# Patient Record
Sex: MALE | Race: OTHER
[De-identification: names, ages, dates, MRNs, and addresses within clinical notes are randomized per-mention and may not be internally consistent; named-entity substitution may affect disease eponyms.]

---

## 2017-10-19 ENCOUNTER — HOSPITAL ENCOUNTER (EMERGENCY)
Dept: HOSPITAL 8 - ED | Age: 37
Discharge: LEFT BEFORE DISCHARGE | End: 2017-10-19
Payer: MEDICAID

## 2017-10-19 VITALS
DIASTOLIC BLOOD PRESSURE: 98 MMHG | HEIGHT: 69 IN | BODY MASS INDEX: 21.52 KG/M2 | WEIGHT: 145.31 LBS | SYSTOLIC BLOOD PRESSURE: 150 MMHG

## 2017-10-19 DIAGNOSIS — F19.10: ICD-10-CM

## 2017-10-19 DIAGNOSIS — Z59.0: ICD-10-CM

## 2017-10-19 DIAGNOSIS — R50.9: Primary | ICD-10-CM

## 2017-10-19 PROCEDURE — 93005 ELECTROCARDIOGRAM TRACING: CPT

## 2017-10-19 PROCEDURE — 99283 EMERGENCY DEPT VISIT LOW MDM: CPT

## 2017-10-19 SDOH — ECONOMIC STABILITY - HOUSING INSECURITY: HOMELESSNESS: Z59.0

## 2018-02-13 ENCOUNTER — HOSPITAL ENCOUNTER (EMERGENCY)
Facility: MEDICAL CENTER | Age: 38
End: 2018-02-13
Attending: EMERGENCY MEDICINE
Payer: MEDICAID

## 2018-02-13 ENCOUNTER — APPOINTMENT (OUTPATIENT)
Dept: RADIOLOGY | Facility: MEDICAL CENTER | Age: 38
End: 2018-02-13
Attending: EMERGENCY MEDICINE
Payer: MEDICAID

## 2018-02-13 VITALS
WEIGHT: 165 LBS | TEMPERATURE: 98.7 F | RESPIRATION RATE: 12 BRPM | OXYGEN SATURATION: 100 % | SYSTOLIC BLOOD PRESSURE: 147 MMHG | HEIGHT: 69 IN | DIASTOLIC BLOOD PRESSURE: 96 MMHG | BODY MASS INDEX: 24.44 KG/M2 | HEART RATE: 98 BPM

## 2018-02-13 DIAGNOSIS — S21.211A STAB WOUND OF RIGHT SIDE OF BACK, INITIAL ENCOUNTER: ICD-10-CM

## 2018-02-13 LAB
ABO GROUP BLD: NORMAL
ALBUMIN SERPL BCP-MCNC: 4.8 G/DL (ref 3.2–4.9)
ALBUMIN/GLOB SERPL: 1.5 G/DL
ALP SERPL-CCNC: 88 U/L (ref 30–99)
ALT SERPL-CCNC: 20 U/L (ref 2–50)
ANION GAP SERPL CALC-SCNC: 15 MMOL/L (ref 0–11.9)
APTT PPP: 25.8 SEC (ref 24.7–36)
AST SERPL-CCNC: 17 U/L (ref 12–45)
BILIRUB SERPL-MCNC: 0.7 MG/DL (ref 0.1–1.5)
BLD GP AB SCN SERPL QL: NORMAL
BUN SERPL-MCNC: 18 MG/DL (ref 8–22)
CALCIUM SERPL-MCNC: 10.2 MG/DL (ref 8.5–10.5)
CHLORIDE SERPL-SCNC: 100 MMOL/L (ref 96–112)
CO2 SERPL-SCNC: 23 MMOL/L (ref 20–33)
CREAT SERPL-MCNC: 1.27 MG/DL (ref 0.5–1.4)
ERYTHROCYTE [DISTWIDTH] IN BLOOD BY AUTOMATED COUNT: 39.6 FL (ref 35.9–50)
ETHANOL BLD-MCNC: 0 G/DL
GLOBULIN SER CALC-MCNC: 3.2 G/DL (ref 1.9–3.5)
GLUCOSE SERPL-MCNC: 143 MG/DL (ref 65–99)
HCT VFR BLD AUTO: 51.7 % (ref 42–52)
HGB BLD-MCNC: 17.2 G/DL (ref 14–18)
INR PPP: 1.02 (ref 0.87–1.13)
MCH RBC QN AUTO: 29.1 PG (ref 27–33)
MCHC RBC AUTO-ENTMCNC: 33.3 G/DL (ref 33.7–35.3)
MCV RBC AUTO: 87.5 FL (ref 81.4–97.8)
PLATELET # BLD AUTO: 387 K/UL (ref 164–446)
PMV BLD AUTO: 8.5 FL (ref 9–12.9)
POTASSIUM SERPL-SCNC: 3.9 MMOL/L (ref 3.6–5.5)
PROT SERPL-MCNC: 8 G/DL (ref 6–8.2)
PROTHROMBIN TIME: 13.1 SEC (ref 12–14.6)
RBC # BLD AUTO: 5.91 M/UL (ref 4.7–6.1)
RH BLD: NORMAL
SODIUM SERPL-SCNC: 138 MMOL/L (ref 135–145)
WBC # BLD AUTO: 13.8 K/UL (ref 4.8–10.8)

## 2018-02-13 PROCEDURE — 86850 RBC ANTIBODY SCREEN: CPT

## 2018-02-13 PROCEDURE — 90715 TDAP VACCINE 7 YRS/> IM: CPT | Performed by: SURGERY

## 2018-02-13 PROCEDURE — 700111 HCHG RX REV CODE 636 W/ 250 OVERRIDE (IP): Performed by: SURGERY

## 2018-02-13 PROCEDURE — 305308 HCHG STAPLER,SKIN,DISP.

## 2018-02-13 PROCEDURE — 80307 DRUG TEST PRSMV CHEM ANLYZR: CPT

## 2018-02-13 PROCEDURE — 99284 EMERGENCY DEPT VISIT MOD MDM: CPT

## 2018-02-13 PROCEDURE — 71045 X-RAY EXAM CHEST 1 VIEW: CPT

## 2018-02-13 PROCEDURE — 85730 THROMBOPLASTIN TIME PARTIAL: CPT

## 2018-02-13 PROCEDURE — 90471 IMMUNIZATION ADMIN: CPT

## 2018-02-13 PROCEDURE — 304217 HCHG IRRIGATION SYSTEM

## 2018-02-13 PROCEDURE — 80053 COMPREHEN METABOLIC PANEL: CPT

## 2018-02-13 PROCEDURE — 86901 BLOOD TYPING SEROLOGIC RH(D): CPT

## 2018-02-13 PROCEDURE — 304999 HCHG REPAIR-SIMPLE/INTERMED LEVEL 1

## 2018-02-13 PROCEDURE — 85610 PROTHROMBIN TIME: CPT

## 2018-02-13 PROCEDURE — 85027 COMPLETE CBC AUTOMATED: CPT

## 2018-02-13 PROCEDURE — 700117 HCHG RX CONTRAST REV CODE 255: Performed by: EMERGENCY MEDICINE

## 2018-02-13 PROCEDURE — 86900 BLOOD TYPING SEROLOGIC ABO: CPT

## 2018-02-13 PROCEDURE — 71260 CT THORAX DX C+: CPT

## 2018-02-13 PROCEDURE — 305949 HCHG RED TRAUMA ACT PRE-NOTIFY NO CC

## 2018-02-13 RX ORDER — CEPHALEXIN 500 MG/1
500 CAPSULE ORAL 4 TIMES DAILY
Qty: 28 CAP | Refills: 0 | Status: SHIPPED | OUTPATIENT
Start: 2018-02-13 | End: 2020-03-08

## 2018-02-13 RX ADMIN — IOHEXOL 100 ML: 350 INJECTION, SOLUTION INTRAVENOUS at 07:26

## 2018-02-13 RX ADMIN — CLOSTRIDIUM TETANI TOXOID ANTIGEN (FORMALDEHYDE INACTIVATED), CORYNEBACTERIUM DIPHTHERIAE TOXOID ANTIGEN (FORMALDEHYDE INACTIVATED), BORDETELLA PERTUSSIS TOXOID ANTIGEN (GLUTARALDEHYDE INACTIVATED), BORDETELLA PERTUSSIS FILAMENTOUS HEMAGGLUTININ ANTIGEN (FORMALDEHYDE INACTIVATED), BORDETELLA PERTUSSIS PERTACTIN ANTIGEN, AND BORDETELLA PERTUSSIS FIMBRIAE 2/3 ANTIGEN 0.5 ML: 5; 2; 2.5; 5; 3; 5 INJECTION, SUSPENSION INTRAMUSCULAR at 07:09

## 2018-02-13 NOTE — DISCHARGE INSTRUCTIONS
Stitches, Staples, or Adhesive Wound Closure    Have staples removed in 10 days      Health care providers use stitches (sutures), staples, and certain glue (skin adhesives) to hold skin together while it heals (wound closure). You may need this treatment after you have surgery or if you cut your skin accidentally. These methods help your skin to heal more quickly and make it less likely that you will have a scar. A wound may take several months to heal completely.  The type of wound you have determines when your wound gets closed. In most cases, the wound is closed as soon as possible (primary skin closure). Sometimes, closure is delayed so the wound can be cleaned and allowed to heal naturally. This reduces the chance of infection. Delayed closure may be needed if your wound:  · Is caused by a bite.  · Happened more than 6 hours ago.  · Involves loss of skin or the tissues under the skin.  · Has dirt or debris in it that cannot be removed.  · Is infected.  WHAT ARE THE DIFFERENT KINDS OF WOUND CLOSURES?  There are many options for wound closure. The one that your health care provider uses depends on how deep and how large your wound is.  Adhesive Glue  To use this type of glue to close a wound, your health care provider holds the edges of the wound together and paints the glue on the surface of your skin. You may need more than one layer of glue. Then the wound may be covered with a light bandage (dressing).  This type of skin closure may be used for small wounds that are not deep (superficial). Using glue for wound closure is less painful than other methods. It does not require a medicine that numbs the area (local anesthetic). This method also leaves nothing to be removed. Adhesive glue is often used for children and on facial wounds.  Adhesive glue cannot be used for wounds that are deep, uneven, or bleeding. It is not used inside of a wound.   Adhesive Strips  These strips are made of sticky (adhesive), porous  paper. They are applied across your skin edges like a regular adhesive bandage. You leave them on until they fall off.  Adhesive strips may be used to close very superficial wounds. They may also be used along with sutures to improve the closure of your skin edges.   Sutures  Sutures are the oldest method of wound closure. Sutures can be made from natural substances, such as silk, or from synthetic materials, such as nylon and steel. They can be made from a material that your body can break down as your wound heals (absorbable), or they can be made from a material that needs to be removed from your skin (nonabsorbable). They come in many different strengths and sizes.  Your health care provider attaches the sutures to a steel needle on one end. Sutures can be passed through your skin, or through the tissues beneath your skin. Then they are tied and cut. Your skin edges may be closed in one continuous stitch or in separate stitches.  Sutures are strong and can be used for all kinds of wounds. Absorbable sutures may be used to close tissues under the skin. The disadvantage of sutures is that they may cause skin reactions that lead to infection. Nonabsorbable sutures need to be removed.  Staples  When surgical staples are used to close a wound, the edges of your skin on both sides of the wound are brought close together. A staple is placed across the wound, and an instrument secures the edges together. Staples are often used to close surgical cuts (incisions).  Staples are faster to use than sutures, and they cause less skin reaction. Staples need to be removed using a tool that bends the staples away from your skin.  HOW DO I CARE FOR MY WOUND CLOSURE?  · Take medicines only as directed by your health care provider.  · If you were prescribed an antibiotic medicine for your wound, finish it all even if you start to feel better.  · Use ointments or creams only as directed by your health care provider.  · Wash your hands  with soap and water before and after touching your wound.  · Do not soak your wound in water. Do not take baths, swim, or use a hot tub until your health care provider approves.  · Ask your health care provider when you can start showering. Cover your wound if directed by your health care provider.  · Do not take out your own sutures or staples.  · Do not pick at your wound. Picking can cause an infection.  · Keep all follow-up visits as directed by your health care provider. This is important.  HOW LONG WILL I HAVE MY WOUND CLOSURE?  · Leave adhesive glue on your skin until the glue peels away.  · Leave adhesive strips on your skin until the strips fall off.  · Absorbable sutures will dissolve within several days.  · Nonabsorbable sutures and staples must be removed. The location of the wound will determine how long they stay in. This can range from several days to a couple of weeks.  WHEN SHOULD I SEEK HELP FOR MY WOUND CLOSURE?  Contact your health care provider if:  · You have a fever.  · You have chills.  · You have drainage, redness, swelling, or pain at your wound.  · There is a bad smell coming from your wound.  · The skin edges of your wound start to separate after your sutures have been removed.  · Your wound becomes thick, raised, and darker in color after your sutures come out (scarring).     This information is not intended to replace advice given to you by your health care provider. Make sure you discuss any questions you have with your health care provider.     Document Released: 09/12/2002 Document Revised: 01/08/2016 Document Reviewed: 05/27/2015  [employer] Interactive Patient Education ©2016 [employer]

## 2018-02-13 NOTE — FLOWSHEET NOTE
Respiratory Trauma Red Note    Intubation No  Pt's SpO2 high 80's placed on 3L NC
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
## 2018-08-29 ENCOUNTER — HOSPITAL ENCOUNTER (EMERGENCY)
Dept: HOSPITAL 8 - ED | Age: 38
LOS: 1 days | Discharge: HOME | End: 2018-08-30
Payer: MEDICAID

## 2018-08-29 VITALS — HEIGHT: 69 IN | WEIGHT: 160.5 LBS | BODY MASS INDEX: 23.77 KG/M2

## 2018-08-29 VITALS — SYSTOLIC BLOOD PRESSURE: 139 MMHG | DIASTOLIC BLOOD PRESSURE: 75 MMHG

## 2018-08-29 DIAGNOSIS — L02.416: ICD-10-CM

## 2018-08-29 DIAGNOSIS — F17.210: ICD-10-CM

## 2018-08-29 DIAGNOSIS — N30.00: Primary | ICD-10-CM

## 2018-08-29 LAB
CULTURE INDICATED?: YES
MICROSCOPIC: (no result)

## 2018-08-29 PROCEDURE — 10060 I&D ABSCESS SIMPLE/SINGLE: CPT

## 2018-08-29 PROCEDURE — 87086 URINE CULTURE/COLONY COUNT: CPT

## 2018-08-29 PROCEDURE — 81001 URINALYSIS AUTO W/SCOPE: CPT

## 2018-08-29 PROCEDURE — 96372 THER/PROPH/DIAG INJ SC/IM: CPT

## 2018-08-29 PROCEDURE — 99284 EMERGENCY DEPT VISIT MOD MDM: CPT

## 2020-03-08 ENCOUNTER — APPOINTMENT (OUTPATIENT)
Dept: RADIOLOGY | Facility: MEDICAL CENTER | Age: 40
DRG: 871 | End: 2020-03-08
Attending: EMERGENCY MEDICINE
Payer: MEDICAID

## 2020-03-08 ENCOUNTER — HOSPITAL ENCOUNTER (INPATIENT)
Facility: MEDICAL CENTER | Age: 40
LOS: 9 days | DRG: 871 | End: 2020-03-17
Attending: EMERGENCY MEDICINE | Admitting: INTERNAL MEDICINE
Payer: MEDICAID

## 2020-03-08 DIAGNOSIS — R09.02 HYPOXIA: ICD-10-CM

## 2020-03-08 DIAGNOSIS — J18.9 PNEUMONIA OF RIGHT LOWER LOBE DUE TO INFECTIOUS ORGANISM: ICD-10-CM

## 2020-03-08 DIAGNOSIS — A41.9 SEPSIS, DUE TO UNSPECIFIED ORGANISM, UNSPECIFIED WHETHER ACUTE ORGAN DYSFUNCTION PRESENT (HCC): ICD-10-CM

## 2020-03-08 PROBLEM — R65.20 SEVERE SEPSIS (HCC): Status: ACTIVE | Noted: 2020-03-08

## 2020-03-08 PROBLEM — D72.829 LEUKOCYTOSIS: Status: ACTIVE | Noted: 2020-03-08

## 2020-03-08 PROBLEM — E87.20 LACTIC ACIDOSIS: Status: ACTIVE | Noted: 2020-03-08

## 2020-03-08 PROBLEM — J96.01 ACUTE RESPIRATORY FAILURE WITH HYPOXIA (HCC): Status: ACTIVE | Noted: 2020-03-08

## 2020-03-08 LAB
ALBUMIN SERPL BCP-MCNC: 4.3 G/DL (ref 3.2–4.9)
ALBUMIN/GLOB SERPL: 1.2 G/DL
ALP SERPL-CCNC: 93 U/L (ref 30–99)
ALT SERPL-CCNC: 21 U/L (ref 2–50)
ANION GAP SERPL CALC-SCNC: 14 MMOL/L (ref 0–11.9)
APPEARANCE UR: CLEAR
AST SERPL-CCNC: 26 U/L (ref 12–45)
BACTERIA #/AREA URNS HPF: NEGATIVE /HPF
BASOPHILS # BLD AUTO: 0.4 % (ref 0–1.8)
BASOPHILS # BLD: 0.1 K/UL (ref 0–0.12)
BILIRUB SERPL-MCNC: 1.5 MG/DL (ref 0.1–1.5)
BILIRUB UR QL STRIP.AUTO: NEGATIVE
BUN SERPL-MCNC: 15 MG/DL (ref 8–22)
CALCIUM SERPL-MCNC: 9.6 MG/DL (ref 8.5–10.5)
CHLORIDE SERPL-SCNC: 95 MMOL/L (ref 96–112)
CO2 SERPL-SCNC: 21 MMOL/L (ref 20–33)
COLOR UR: YELLOW
CREAT SERPL-MCNC: 1.09 MG/DL (ref 0.5–1.4)
EKG IMPRESSION: NORMAL
EOSINOPHIL # BLD AUTO: 0 K/UL (ref 0–0.51)
EOSINOPHIL NFR BLD: 0 % (ref 0–6.9)
ERYTHROCYTE [DISTWIDTH] IN BLOOD BY AUTOMATED COUNT: 41.8 FL (ref 35.9–50)
FLUAV RNA SPEC QL NAA+PROBE: NEGATIVE
FLUBV RNA SPEC QL NAA+PROBE: NEGATIVE
GLOBULIN SER CALC-MCNC: 3.6 G/DL (ref 1.9–3.5)
GLUCOSE SERPL-MCNC: 159 MG/DL (ref 65–99)
GLUCOSE UR STRIP.AUTO-MCNC: NEGATIVE MG/DL
HCT VFR BLD AUTO: 51.8 % (ref 42–52)
HGB BLD-MCNC: 17.6 G/DL (ref 14–18)
IMM GRANULOCYTES # BLD AUTO: 0.12 K/UL (ref 0–0.11)
IMM GRANULOCYTES NFR BLD AUTO: 0.5 % (ref 0–0.9)
KETONES UR STRIP.AUTO-MCNC: NEGATIVE MG/DL
LACTATE BLD-SCNC: 2.9 MMOL/L (ref 0.5–2)
LACTATE BLD-SCNC: 3.7 MMOL/L (ref 0.5–2)
LACTATE BLD-SCNC: 4.9 MMOL/L (ref 0.5–2)
LEUKOCYTE ESTERASE UR QL STRIP.AUTO: NEGATIVE
LYMPHOCYTES # BLD AUTO: 0.44 K/UL (ref 1–4.8)
LYMPHOCYTES NFR BLD: 1.8 % (ref 22–41)
MCH RBC QN AUTO: 30.7 PG (ref 27–33)
MCHC RBC AUTO-ENTMCNC: 34 G/DL (ref 33.7–35.3)
MCV RBC AUTO: 90.4 FL (ref 81.4–97.8)
MICRO URNS: ABNORMAL
MONOCYTES # BLD AUTO: 0.23 K/UL (ref 0–0.85)
MONOCYTES NFR BLD AUTO: 0.9 % (ref 0–13.4)
NEUTROPHILS # BLD AUTO: 23.8 K/UL (ref 1.82–7.42)
NEUTROPHILS NFR BLD: 96.4 % (ref 44–72)
NITRITE UR QL STRIP.AUTO: NEGATIVE
NRBC # BLD AUTO: 0 K/UL
NRBC BLD-RTO: 0 /100 WBC
PH UR STRIP.AUTO: 6 [PH] (ref 5–8)
PLATELET # BLD AUTO: 373 K/UL (ref 164–446)
PMV BLD AUTO: 8.5 FL (ref 9–12.9)
POTASSIUM SERPL-SCNC: 3.9 MMOL/L (ref 3.6–5.5)
PROCALCITONIN SERPL-MCNC: 24.79 NG/ML
PROT SERPL-MCNC: 7.9 G/DL (ref 6–8.2)
PROT UR QL STRIP: 30 MG/DL
RBC # BLD AUTO: 5.73 M/UL (ref 4.7–6.1)
RBC # URNS HPF: ABNORMAL /HPF
RBC UR QL AUTO: NEGATIVE
SODIUM SERPL-SCNC: 130 MMOL/L (ref 135–145)
SP GR UR STRIP.AUTO: 1.02
SPERM #/AREA URNS HPF: ABNORMAL /HPF
UROBILINOGEN UR STRIP.AUTO-MCNC: 1 MG/DL
WBC # BLD AUTO: 24.7 K/UL (ref 4.8–10.8)
WBC #/AREA URNS HPF: ABNORMAL /HPF

## 2020-03-08 PROCEDURE — 700111 HCHG RX REV CODE 636 W/ 250 OVERRIDE (IP): Performed by: STUDENT IN AN ORGANIZED HEALTH CARE EDUCATION/TRAINING PROGRAM

## 2020-03-08 PROCEDURE — 96365 THER/PROPH/DIAG IV INF INIT: CPT

## 2020-03-08 PROCEDURE — 99291 CRITICAL CARE FIRST HOUR: CPT

## 2020-03-08 PROCEDURE — 87181 SC STD AGAR DILUTION PER AGT: CPT

## 2020-03-08 PROCEDURE — 87502 INFLUENZA DNA AMP PROBE: CPT

## 2020-03-08 PROCEDURE — 94760 N-INVAS EAR/PLS OXIMETRY 1: CPT

## 2020-03-08 PROCEDURE — 700102 HCHG RX REV CODE 250 W/ 637 OVERRIDE(OP): Performed by: EMERGENCY MEDICINE

## 2020-03-08 PROCEDURE — 71045 X-RAY EXAM CHEST 1 VIEW: CPT

## 2020-03-08 PROCEDURE — 81001 URINALYSIS AUTO W/SCOPE: CPT

## 2020-03-08 PROCEDURE — 96367 TX/PROPH/DG ADDL SEQ IV INF: CPT

## 2020-03-08 PROCEDURE — 87077 CULTURE AEROBIC IDENTIFY: CPT

## 2020-03-08 PROCEDURE — A9270 NON-COVERED ITEM OR SERVICE: HCPCS | Performed by: EMERGENCY MEDICINE

## 2020-03-08 PROCEDURE — 700105 HCHG RX REV CODE 258: Performed by: EMERGENCY MEDICINE

## 2020-03-08 PROCEDURE — 770022 HCHG ROOM/CARE - ICU (200)

## 2020-03-08 PROCEDURE — 36415 COLL VENOUS BLD VENIPUNCTURE: CPT

## 2020-03-08 PROCEDURE — 87040 BLOOD CULTURE FOR BACTERIA: CPT

## 2020-03-08 PROCEDURE — 700111 HCHG RX REV CODE 636 W/ 250 OVERRIDE (IP): Performed by: EMERGENCY MEDICINE

## 2020-03-08 PROCEDURE — 84145 PROCALCITONIN (PCT): CPT

## 2020-03-08 PROCEDURE — 80053 COMPREHEN METABOLIC PANEL: CPT

## 2020-03-08 PROCEDURE — 700105 HCHG RX REV CODE 258: Performed by: STUDENT IN AN ORGANIZED HEALTH CARE EDUCATION/TRAINING PROGRAM

## 2020-03-08 PROCEDURE — 85025 COMPLETE CBC W/AUTO DIFF WBC: CPT

## 2020-03-08 PROCEDURE — 87086 URINE CULTURE/COLONY COUNT: CPT

## 2020-03-08 PROCEDURE — 93010 ELECTROCARDIOGRAM REPORT: CPT | Performed by: INTERNAL MEDICINE

## 2020-03-08 PROCEDURE — 83605 ASSAY OF LACTIC ACID: CPT | Mod: 91

## 2020-03-08 PROCEDURE — 93005 ELECTROCARDIOGRAM TRACING: CPT | Performed by: STUDENT IN AN ORGANIZED HEALTH CARE EDUCATION/TRAINING PROGRAM

## 2020-03-08 PROCEDURE — 99291 CRITICAL CARE FIRST HOUR: CPT | Performed by: INTERNAL MEDICINE

## 2020-03-08 RX ORDER — SODIUM CHLORIDE, SODIUM LACTATE, POTASSIUM CHLORIDE, CALCIUM CHLORIDE 600; 310; 30; 20 MG/100ML; MG/100ML; MG/100ML; MG/100ML
INJECTION, SOLUTION INTRAVENOUS CONTINUOUS
Status: DISCONTINUED | OUTPATIENT
Start: 2020-03-08 | End: 2020-03-09

## 2020-03-08 RX ORDER — BISACODYL 10 MG
10 SUPPOSITORY, RECTAL RECTAL
Status: DISCONTINUED | OUTPATIENT
Start: 2020-03-08 | End: 2020-03-17 | Stop reason: HOSPADM

## 2020-03-08 RX ORDER — AZITHROMYCIN 250 MG/1
500 TABLET, FILM COATED ORAL DAILY
Status: DISCONTINUED | OUTPATIENT
Start: 2020-03-09 | End: 2020-03-09

## 2020-03-08 RX ORDER — AMOXICILLIN 250 MG
2 CAPSULE ORAL 2 TIMES DAILY
Status: DISCONTINUED | OUTPATIENT
Start: 2020-03-08 | End: 2020-03-17 | Stop reason: HOSPADM

## 2020-03-08 RX ORDER — ACETAMINOPHEN 325 MG/1
650 TABLET ORAL EVERY 6 HOURS PRN
Status: DISCONTINUED | OUTPATIENT
Start: 2020-03-08 | End: 2020-03-17 | Stop reason: HOSPADM

## 2020-03-08 RX ORDER — ACETAMINOPHEN 325 MG/1
650 TABLET ORAL ONCE
Status: COMPLETED | OUTPATIENT
Start: 2020-03-08 | End: 2020-03-08

## 2020-03-08 RX ORDER — IBUPROFEN 600 MG/1
600 TABLET ORAL ONCE
Status: COMPLETED | OUTPATIENT
Start: 2020-03-08 | End: 2020-03-08

## 2020-03-08 RX ORDER — POLYETHYLENE GLYCOL 3350 17 G/17G
1 POWDER, FOR SOLUTION ORAL
Status: DISCONTINUED | OUTPATIENT
Start: 2020-03-08 | End: 2020-03-17 | Stop reason: HOSPADM

## 2020-03-08 RX ORDER — SODIUM CHLORIDE, SODIUM LACTATE, POTASSIUM CHLORIDE, AND CALCIUM CHLORIDE .6; .31; .03; .02 G/100ML; G/100ML; G/100ML; G/100ML
2500 INJECTION, SOLUTION INTRAVENOUS ONCE
Status: COMPLETED | OUTPATIENT
Start: 2020-03-08 | End: 2020-03-08

## 2020-03-08 RX ORDER — AZITHROMYCIN 500 MG/1
500 INJECTION, POWDER, LYOPHILIZED, FOR SOLUTION INTRAVENOUS ONCE
Status: COMPLETED | OUTPATIENT
Start: 2020-03-08 | End: 2020-03-08

## 2020-03-08 RX ADMIN — ACETAMINOPHEN 650 MG: 325 TABLET, FILM COATED ORAL at 11:21

## 2020-03-08 RX ADMIN — CEFTRIAXONE SODIUM 2 G: 2 INJECTION, POWDER, FOR SOLUTION INTRAMUSCULAR; INTRAVENOUS at 17:04

## 2020-03-08 RX ADMIN — SODIUM CHLORIDE, POTASSIUM CHLORIDE, SODIUM LACTATE AND CALCIUM CHLORIDE 2500 ML: 600; 310; 30; 20 INJECTION, SOLUTION INTRAVENOUS at 10:30

## 2020-03-08 RX ADMIN — AZITHROMYCIN MONOHYDRATE 500 MG: 500 INJECTION, POWDER, LYOPHILIZED, FOR SOLUTION INTRAVENOUS at 11:22

## 2020-03-08 RX ADMIN — SODIUM CHLORIDE, POTASSIUM CHLORIDE, SODIUM LACTATE AND CALCIUM CHLORIDE: 600; 310; 30; 20 INJECTION, SOLUTION INTRAVENOUS at 15:39

## 2020-03-08 RX ADMIN — IBUPROFEN 600 MG: 600 TABLET ORAL at 11:21

## 2020-03-08 RX ADMIN — AMPICILLIN SODIUM AND SULBACTAM SODIUM 3 G: 2; 1 INJECTION, POWDER, FOR SOLUTION INTRAMUSCULAR; INTRAVENOUS at 11:22

## 2020-03-08 ASSESSMENT — ENCOUNTER SYMPTOMS
DIARRHEA: 0
SHORTNESS OF BREATH: 1
MYALGIAS: 1
EYE PAIN: 0
WHEEZING: 0
FEVER: 1
FOCAL WEAKNESS: 0
SPEECH CHANGE: 0
DEPRESSION: 0
SPUTUM PRODUCTION: 1
COUGH: 1
ABDOMINAL PAIN: 0
NERVOUS/ANXIOUS: 0
NAUSEA: 0
SINUS PAIN: 0
DIZZINESS: 1
FLANK PAIN: 0
DIZZINESS: 0
HEADACHES: 0
SENSORY CHANGE: 0
PALPITATIONS: 0
BRUISES/BLEEDS EASILY: 0
VOMITING: 0
BLURRED VISION: 0
SORE THROAT: 0
NECK PAIN: 0
WEAKNESS: 1
WEIGHT LOSS: 0
EYE DISCHARGE: 0
CHILLS: 1
BACK PAIN: 0
DOUBLE VISION: 0
MYALGIAS: 0

## 2020-03-08 ASSESSMENT — LIFESTYLE VARIABLES
DO YOU DRINK ALCOHOL: NO
DOES PATIENT WANT TO STOP DRINKING: NO
SUBSTANCE_ABUSE: 1

## 2020-03-08 NOTE — ASSESSMENT & PLAN NOTE
This is Sepsis Present on admission  SIRS criteria identified on my evaluation include: Fever, with temperature greater than 101 deg F, Tachycardia, with heart rate greater than 90 BPM, Tachypnea, with respirations greater than 20 per minute and Leukocyosis, with WBC greater than 12,000  Source is RLL pneumonia  Sepsis protocol initiated  Fluid resuscitation ordered per protocol  IV antibiotics as appropriate for source of sepsis  While organ dysfunction may be noted elsewhere in this problem list or in the chart, degree of organ dysfunction does not meet CMS criteria for severe sepsis    Mee MAS/tomas

## 2020-03-08 NOTE — CONSULTS
Critical Care Consultation    Date of consult: 3/8/2020    Referring Physician  Neftali Sevilla M.D.    Reason for Consultation  Critical care management for sepsis and elevated lactic acid    History of Presenting Illness  Mr. Gabriel is a relatively healthy 39 year old male who is homeless and occasionally smokes methamphetamines who was brought in by ambulance because of feeling poorly with fevers, chills, and muscle aching.  He reports that symptoms started about 2 days ago and also complains of cough with sputum production and generalized weakness with fatigue.  He called the ambulance because he was feeling more short of breath.  He denies chest pain, nausea, vomiting, diarrhea, or leg swelling.  He denies IV drug use.  He denies recent sick contacts as well as recent travel.  He reports that he lives in a tent in [place] with 6 other adults.  He reports that no one has been ill recently.    Code Status  Full Code    Review of Systems  Review of Systems   Constitutional: Positive for chills, fever and malaise/fatigue. Negative for weight loss.   HENT: Negative for congestion, sinus pain and sore throat.    Eyes: Negative for pain and discharge.   Respiratory: Positive for cough, sputum production and shortness of breath. Negative for wheezing.    Cardiovascular: Negative for chest pain and leg swelling.   Gastrointestinal: Negative for abdominal pain, diarrhea, nausea and vomiting.   Genitourinary: Negative for dysuria, flank pain, frequency and urgency.   Musculoskeletal: Positive for joint pain and myalgias. Negative for back pain and neck pain.   Skin: Negative for rash.   Neurological: Positive for dizziness and weakness. Negative for sensory change, speech change, focal weakness and headaches.   Endo/Heme/Allergies: Does not bruise/bleed easily.   Psychiatric/Behavioral: Positive for substance abuse. Negative for depression. The patient is not nervous/anxious.        Past Medical History  No dm, cad,  htn    Surgical History  No surgeries    Family History  family history is not on file.    Social History   reports that he has never smoked. He does not have any smokeless tobacco history on file. He reports current drug use. He reports that he does not drink alcohol.  Pt endorses smoking methamphetamines yesterday.  He is homeless living in a tent with 6 other adults.  He denies recent travel or sick contacts    Medications  Home Medications     Reviewed by Sanjiv Lim (Pharmacy [facility]) on 03/08/20 at 1251  Med List Status: Complete   Medication Last Dose Status        Patient Cory Taking any Medications                     Current Facility-Administered Medications   Medication Dose Route Frequency Provider Last Rate Last Dose   • senna-docusate (PERICOLACE or SENOKOT S) 8.6-50 MG per tablet 2 Tab  2 Tab Oral BID Phu Taylor M.D.        And   • polyethylene glycol/lytes (MIRALAX) PACKET 1 Packet  1 Packet Oral QDAY PRN Phu Taylor M.D.        And   • magnesium hydroxide (MILK OF MAGNESIA) suspension 30 mL  30 mL Oral QDAY PRN Phu Taylor M.D.        And   • bisacodyl (DULCOLAX) suppository 10 mg  10 mg Rectal QDAY PRN Phu Taylor M.D.       • Respiratory Therapy Consult   Nebulization Continuous RT Phu Taylor M.D.       • lactated ringers infusion   Intravenous Continuous Phu Taylor M.D.       • [START ON 3/9/2020] enoxaparin (LOVENOX) inj 40 mg  40 mg Subcutaneous DAILY Phu Taylor M.D.       • acetaminophen (TYLENOL) tablet 650 mg  650 mg Oral Q6HRS PRN Phu Taylor M.D.       • cefTRIAXone (ROCEPHIN) 2 g in  mL IVPB  2 g Intravenous Q24HRS Phu Taylor M.D.       • [START ON 3/9/2020] azithromycin (ZITHROMAX) tablet 500 mg  500 mg Oral DAILY Puh Taylor M.D.         No current outpatient medications on file.       Allergies  No Known Allergies    Vital Signs last 24 hours  Temp:  [36.8 °C (98.3 °F)] 36.8 °C (98.3 °F)  Pulse:  [114-138] 114  Resp:  [26] 26  BP:  (123-151)/() 128/76  SpO2:  [86 %-97 %] 97 %    Physical Exam  Physical Exam  Vitals signs and nursing note reviewed.   Constitutional:       Appearance: Normal appearance. He is normal weight. He is ill-appearing and diaphoretic. He is not toxic-appearing.      Comments: Very lethargic, disheveled/dirty, appears older than stated age   HENT:      Head: Normocephalic and atraumatic.      Right Ear: External ear normal.      Left Ear: External ear normal.      Nose: Congestion and rhinorrhea present.      Mouth/Throat:      Mouth: Mucous membranes are moist.      Pharynx: Oropharynx is clear. No oropharyngeal exudate.      Comments: Poor dentition  Eyes:      General: No scleral icterus.     Extraocular Movements: Extraocular movements intact.      Conjunctiva/sclera: Conjunctivae normal.      Pupils: Pupils are equal, round, and reactive to light.   Neck:      Musculoskeletal: Normal range of motion and neck supple.   Cardiovascular:      Rate and Rhythm: Regular rhythm. Tachycardia present.      Pulses: Normal pulses.      Heart sounds: Normal heart sounds. No murmur.   Pulmonary:      Effort: Pulmonary effort is normal. No respiratory distress.      Breath sounds: No wheezing.      Comments: Coarse breath sounds heard to the right base  Chest:      Chest wall: No tenderness.   Abdominal:      General: Abdomen is flat. Bowel sounds are normal. There is no distension.      Palpations: Abdomen is soft. There is no mass.      Tenderness: There is no abdominal tenderness. There is no guarding.   Musculoskeletal: Normal range of motion.      Right lower leg: No edema.      Left lower leg: No edema.   Lymphadenopathy:      Cervical: No cervical adenopathy.   Skin:     General: Skin is warm.      Capillary Refill: Capillary refill takes less than 2 seconds.      Coloration: Skin is not jaundiced.      Findings: No rash.   Neurological:      Mental Status: He is oriented to person, place, and time.      Cranial  Nerves: No cranial nerve deficit.      Sensory: No sensory deficit.      Motor: No weakness.   Psychiatric:         Behavior: Behavior normal.         Thought Content: Thought content normal.         Judgment: Judgment normal.      Comments: Flat affect, tearful at times         Fluids    Intake/Output Summary (Last 24 hours) at 3/8/2020 1331  Last data filed at 3/8/2020 1208  Gross per 24 hour   Intake 2500 ml   Output --   Net 2500 ml       Laboratory  Recent Results (from the past 48 hour(s))   Lactic acid (lactate)    Collection Time: 03/08/20 10:10 AM   Result Value Ref Range    Lactic Acid 4.9 (HH) 0.5 - 2.0 mmol/L   CBC WITH DIFFERENTIAL    Collection Time: 03/08/20 10:10 AM   Result Value Ref Range    WBC 24.7 (H) 4.8 - 10.8 K/uL    RBC 5.73 4.70 - 6.10 M/uL    Hemoglobin 17.6 14.0 - 18.0 g/dL    Hematocrit 51.8 42.0 - 52.0 %    MCV 90.4 81.4 - 97.8 fL    MCH 30.7 27.0 - 33.0 pg    MCHC 34.0 33.7 - 35.3 g/dL    RDW 41.8 35.9 - 50.0 fL    Platelet Count 373 164 - 446 K/uL    MPV 8.5 (L) 9.0 - 12.9 fL    Neutrophils-Polys 96.40 (H) 44.00 - 72.00 %    Lymphocytes 1.80 (L) 22.00 - 41.00 %    Monocytes 0.90 0.00 - 13.40 %    Eosinophils 0.00 0.00 - 6.90 %    Basophils 0.40 0.00 - 1.80 %    Immature Granulocytes 0.50 0.00 - 0.90 %    Nucleated RBC 0.00 /100 WBC    Neutrophils (Absolute) 23.80 (H) 1.82 - 7.42 K/uL    Lymphs (Absolute) 0.44 (L) 1.00 - 4.80 K/uL    Monos (Absolute) 0.23 0.00 - 0.85 K/uL    Eos (Absolute) 0.00 0.00 - 0.51 K/uL    Baso (Absolute) 0.10 0.00 - 0.12 K/uL    Immature Granulocytes (abs) 0.12 (H) 0.00 - 0.11 K/uL    NRBC (Absolute) 0.00 K/uL   COMP METABOLIC PANEL    Collection Time: 03/08/20 10:10 AM   Result Value Ref Range    Sodium 130 (L) 135 - 145 mmol/L    Potassium 3.9 3.6 - 5.5 mmol/L    Chloride 95 (L) 96 - 112 mmol/L    Co2 21 20 - 33 mmol/L    Anion Gap 14.0 (H) 0.0 - 11.9    Glucose 159 (H) 65 - 99 mg/dL    Bun 15 8 - 22 mg/dL    Creatinine 1.09 0.50 - 1.40 mg/dL    Calcium 9.6  8.5 - 10.5 mg/dL    AST(SGOT) 26 12 - 45 U/L    ALT(SGPT) 21 2 - 50 U/L    Alkaline Phosphatase 93 30 - 99 U/L    Total Bilirubin 1.5 0.1 - 1.5 mg/dL    Albumin 4.3 3.2 - 4.9 g/dL    Total Protein 7.9 6.0 - 8.2 g/dL    Globulin 3.6 (H) 1.9 - 3.5 g/dL    A-G Ratio 1.2 g/dL   ESTIMATED GFR    Collection Time: 03/08/20 10:10 AM   Result Value Ref Range    GFR If African American >60 >60 mL/min/1.73 m 2    GFR If Non African American >60 >60 mL/min/1.73 m 2   URINALYSIS    Collection Time: 03/08/20 10:40 AM   Result Value Ref Range    Color Yellow     Character Clear     Specific Gravity 1.021 <1.035    Ph 6.0 5.0 - 8.0    Glucose Negative Negative mg/dL    Ketones Negative Negative mg/dL    Protein 30 (A) Negative mg/dL    Bilirubin Negative Negative    Urobilinogen, Urine 1.0 Negative    Nitrite Negative Negative    Leukocyte Esterase Negative Negative    Occult Blood Negative Negative    Micro Urine Req Microscopic    URINE MICROSCOPIC (W/UA)    Collection Time: 03/08/20 10:40 AM   Result Value Ref Range    WBC Rare (A) /hpf    RBC Rare /hpf    Bacteria Negative None /hpf    Sperm Few /hpf   Influenza A/B By PCR (Adult - Flu Only)    Collection Time: 03/08/20 10:58 AM   Result Value Ref Range    Influenza virus A RNA Negative Negative    Influenza virus B, PCR Negative Negative   Lactic acid (lactate): Repeat if initial lactic acid result is greater than 2    Collection Time: 03/08/20 12:38 PM   Result Value Ref Range    Lactic Acid 3.7 (H) 0.5 - 2.0 mmol/L       Imaging  CXR(reviewed): large right lower lobe infiltrate    Assessment/Plan  * Sepsis (HCC)  Assessment & Plan  This is Sepsis Present on admission  SIRS criteria identified on my evaluation include: Fever, with temperature greater than 101 deg F, Tachycardia, with heart rate greater than 90 BPM, Tachypnea, with respirations greater than 20 per minute and Leukocyosis, with WBC greater than 12,000  Source is RLL pneumonia  Sepsis protocol initiated  Fluid  resuscitation ordered per protocol  IV antibiotics as appropriate for source of sepsis  While organ dysfunction may be noted elsewhere in this problem list or in the chart, degree of organ dysfunction does not meet CMS criteria for severe sepsis    Continue sepsis protocols  Continue IVF bolus per protocol  Trend lactic acid  Continue C3/azithro  Monitor for vasopressor needs  PCT at 24          Lactic acidosis- (present on admission)  Assessment & Plan  Likely due to hypoxia and sepsis  Continue to trend    Acute respiratory failure with hypoxia (HCC)- (present on admission)  Assessment & Plan  Due to RLL infiltrate/PNA  Continue O2 supplementation  Will wean O2 as tolerated  Continue RT protocols  Influenza A/B negative  Low threshold to intubate    RLL pneumonia (HCC)  Assessment & Plan  Likely community-acquired  Continue Ceftriaxone/Azithromycin  Follow BCx2 and sputum cultures  Monitor CXR for possible parapneumonic effusion  PCT at 24 and will follow    Leukocytosis- (present on admission)  Assessment & Plan  Likely due to infection and RLL pneumonia  Continue to monitor  Follow BCx2 and sputum cultures  Cont abx      Discussed patient condition and risk of morbidity and/or mortality with RN, RT, Pharmacy, [employer] resident and Patient.    The patient remains critically ill.  Critical care time = 46 minutes in directly providing and coordinating critical care and extensive data review.  No time overlap and excludes procedures.

## 2020-03-08 NOTE — ED TRIAGE NOTES
"Chief Complaint   Patient presents with   • Flu Like Symptoms     BIB [facility] for cough, chills, body aches x 2 days. Per EMS room air sat 90%.      Pulse (!) 138   Temp 36.8 °C (98.3 °F) (Oral)   Resp (!) 26   Ht 1.803 m (5' 11\")   Wt 68 kg (150 lb)   BMI 20.92 kg/m²     "

## 2020-03-08 NOTE — H&P
Internal Medicine Admitting History and Physical    Note Author: Phu Taylor M.D.       Name Harvinder Gabriel     1980   Age/Sex 39 y.o. male   MRN [id number]   Code Status Full     After 5PM or if no immediate response to page, please call for cross-coverage  Attending/Team: SAILAJA Gamboa See Patient List for primary contact information  Call [phone] to page    1st Call - Day Intern (R1):   - 2nd Call - Day Sr. Resident (R2/R3):   Brandon / Jennifer / Chelle       Chief Complaint:   Dyspnea, fever, chills, cough, body aches    HPI:  39M, no significant past medical history; presents after few days of dyspnea, fever, chills, cough, body aches. He states he's been living in a tent in [place], denies sick contacts or recent travel. He is lethargic upon interview, limiting conversation. He endorses meth use. Patient with initial lactic acid of 4.9 which improved to 3.7 after IVF bolus. Discussed with ERP who stated hospital policy for any lactate >4 to admit to ICU for 24 hours, so will admit to ICU.    Review of Systems   Constitutional: Positive for chills, fever and malaise/fatigue.   HENT: Negative for ear pain and sinus pain.    Eyes: Negative for blurred vision and double vision.   Respiratory: Positive for cough and shortness of breath.    Cardiovascular: Negative for chest pain and palpitations.   Gastrointestinal: Negative for nausea and vomiting.   Genitourinary: Negative for dysuria and hematuria.   Musculoskeletal: Negative for joint pain and myalgias.   Skin: Negative for rash.   Neurological: Negative for dizziness and headaches.             Past Medical History (Chronic medical problem, known complications and current treatment)    None significant     Past Surgical History:  History reviewed. No pertinent surgical history.    Current Outpatient Medications:  Home Medications     Reviewed by Sanjiv Lim (Pharmacy [facility]) on 20 at 1251  Med List Status: Complete   Medication Last  Dose Status        Patient Cory Taking any Medications                       Medication Allergy/Sensitivities:  No Known Allergies      Family History (mandatory)   History reviewed. No pertinent family history.    Social History (mandatory)   Social History     Socioeconomic History   • Marital status: Single     Spouse name: Not on file   • Number of children: Not on file   • Years of education: Not on file   • Highest education level: Not on file   Occupational History   • Not on file   Social Needs   • Financial resource strain: Not on file   • Food insecurity     Worry: Not on file     Inability: Not on file   • Transportation needs     Medical: Not on file     Non-medical: Not on file   Tobacco Use   • Smoking status: Never Smoker   Substance and Sexual Activity   • Alcohol use: No   • Drug use: Yes     Comment: Pot and meth   • Sexual activity: Not on file   Lifestyle   • Physical activity     Days per week: Not on file     Minutes per session: Not on file   • Stress: Not on file   Relationships   • Social connections     Talks on phone: Not on file     Gets together: Not on file     Attends Confucianist service: Not on file     Active member of club or organization: Not on file     Attends meetings of clubs or organizations: Not on file     Relationship status: Not on file   • Intimate partner violence     Fear of current or ex partner: Not on file     Emotionally abused: Not on file     Physically abused: Not on file     Forced sexual activity: Not on file   Other Topics Concern   • Not on file   Social History Narrative   • Not on file     Living situation: Lives in a tent in [place]  PCP : Pcp Pt States None    Physical Exam     Vitals:    03/08/20 1031 03/08/20 1128 03/08/20 1131 03/08/20 1331   BP: 123/77 123/82 128/76 110/76   Pulse: (!) 127 (!) 120 (!) 114 (!) 106   Resp:       Temp:       TempSrc:       SpO2: 96% 95% 97% 93%   Weight:       Height:         Body mass index is 20.92 kg/m².  O2 therapy:  Pulse Oximetry: 93 %, O2 (LPM): 2, O2 Delivery Device: Nasal Cannula    Physical Exam   Constitutional: He is oriented to person, place, and time. No distress.   Patient lethargic but arousable   HENT:   Head: Normocephalic and atraumatic.   Eyes: Pupils are equal, round, and reactive to light. Conjunctivae and EOM are normal. Right eye exhibits no discharge. Left eye exhibits no discharge. No scleral icterus.   Neck: Normal range of motion. Neck supple. No tracheal deviation present.   Cardiovascular: Exam reveals no gallop and no friction rub.   No murmur heard.  tachycardic   Pulmonary/Chest: No respiratory distress. He has no wheezes. He has no rales. He exhibits no tenderness.   diminished breath sounds over right base   Abdominal: Soft. Bowel sounds are normal. He exhibits no distension. There is no abdominal tenderness.   Musculoskeletal: Normal range of motion.         General: No tenderness, deformity or edema.   Neurological: He is alert and oriented to person, place, and time. He exhibits normal muscle tone. Coordination normal.   Skin: Skin is warm. No rash noted. He is diaphoretic. No erythema. No pallor.         Data Review       Old Records Request:   Deferred  Current Records review/summary: Completed    Lab Data Review:  Recent Results (from the past 24 hour(s))   Lactic acid (lactate)    Collection Time: 03/08/20 10:10 AM   Result Value Ref Range    Lactic Acid 4.9 (HH) 0.5 - 2.0 mmol/L   CBC WITH DIFFERENTIAL    Collection Time: 03/08/20 10:10 AM   Result Value Ref Range    WBC 24.7 (H) 4.8 - 10.8 K/uL    RBC 5.73 4.70 - 6.10 M/uL    Hemoglobin 17.6 14.0 - 18.0 g/dL    Hematocrit 51.8 42.0 - 52.0 %    MCV 90.4 81.4 - 97.8 fL    MCH 30.7 27.0 - 33.0 pg    MCHC 34.0 33.7 - 35.3 g/dL    RDW 41.8 35.9 - 50.0 fL    Platelet Count 373 164 - 446 K/uL    MPV 8.5 (L) 9.0 - 12.9 fL    Neutrophils-Polys 96.40 (H) 44.00 - 72.00 %    Lymphocytes 1.80 (L) 22.00 - 41.00 %    Monocytes 0.90 0.00 - 13.40 %     Eosinophils 0.00 0.00 - 6.90 %    Basophils 0.40 0.00 - 1.80 %    Immature Granulocytes 0.50 0.00 - 0.90 %    Nucleated RBC 0.00 /100 WBC    Neutrophils (Absolute) 23.80 (H) 1.82 - 7.42 K/uL    Lymphs (Absolute) 0.44 (L) 1.00 - 4.80 K/uL    Monos (Absolute) 0.23 0.00 - 0.85 K/uL    Eos (Absolute) 0.00 0.00 - 0.51 K/uL    Baso (Absolute) 0.10 0.00 - 0.12 K/uL    Immature Granulocytes (abs) 0.12 (H) 0.00 - 0.11 K/uL    NRBC (Absolute) 0.00 K/uL   COMP METABOLIC PANEL    Collection Time: 03/08/20 10:10 AM   Result Value Ref Range    Sodium 130 (L) 135 - 145 mmol/L    Potassium 3.9 3.6 - 5.5 mmol/L    Chloride 95 (L) 96 - 112 mmol/L    Co2 21 20 - 33 mmol/L    Anion Gap 14.0 (H) 0.0 - 11.9    Glucose 159 (H) 65 - 99 mg/dL    Bun 15 8 - 22 mg/dL    Creatinine 1.09 0.50 - 1.40 mg/dL    Calcium 9.6 8.5 - 10.5 mg/dL    AST(SGOT) 26 12 - 45 U/L    ALT(SGPT) 21 2 - 50 U/L    Alkaline Phosphatase 93 30 - 99 U/L    Total Bilirubin 1.5 0.1 - 1.5 mg/dL    Albumin 4.3 3.2 - 4.9 g/dL    Total Protein 7.9 6.0 - 8.2 g/dL    Globulin 3.6 (H) 1.9 - 3.5 g/dL    A-G Ratio 1.2 g/dL   ESTIMATED GFR    Collection Time: 03/08/20 10:10 AM   Result Value Ref Range    GFR If African American >60 >60 mL/min/1.73 m 2    GFR If Non African American >60 >60 mL/min/1.73 m 2   URINALYSIS    Collection Time: 03/08/20 10:40 AM   Result Value Ref Range    Color Yellow     Character Clear     Specific Gravity 1.021 <1.035    Ph 6.0 5.0 - 8.0    Glucose Negative Negative mg/dL    Ketones Negative Negative mg/dL    Protein 30 (A) Negative mg/dL    Bilirubin Negative Negative    Urobilinogen, Urine 1.0 Negative    Nitrite Negative Negative    Leukocyte Esterase Negative Negative    Occult Blood Negative Negative    Micro Urine Req Microscopic    URINE MICROSCOPIC (W/UA)    Collection Time: 03/08/20 10:40 AM   Result Value Ref Range    WBC Rare (A) /hpf    RBC Rare /hpf    Bacteria Negative None /hpf    Sperm Few /hpf   Influenza A/B By PCR (Adult - Flu  Only)    Collection Time: 03/08/20 10:58 AM   Result Value Ref Range    Influenza virus A RNA Negative Negative    Influenza virus B, PCR Negative Negative   Lactic acid (lactate): Repeat if initial lactic acid result is greater than 2    Collection Time: 03/08/20 12:38 PM   Result Value Ref Range    Lactic Acid 3.7 (H) 0.5 - 2.0 mmol/L       Imaging/Procedures Review:    Independant Imaging Review: Completed  DX-CHEST-PORTABLE (1 VIEW)   Final Result      Consolidation within the right lung base.                      Assessment/Plan     * Severe sepsis (HCC)  Assessment & Plan  This is Severe Sepsis Present on admission  SIRS criteria identified on my evaluation include: Tachycardia, with heart rate greater than 90 BPM, Tachypnea, with respirations greater than 20 per minute and Leukocyosis, with WBC greater than 12,000  Source of infection is RLL pneumonia  Clinical indicators of end organ dysfunction include Lactate >2 mmol/L (18.0 mg/dL)  Sepsis protocol initiated  Fluid resuscitation ordered per protocol  IV antibiotics as appropriate for source of sepsis  Reassessment: I have reassessed the patient's hemodynamic status  -ceftriaxone, azithromycin  -trend lactate until normalized  -24 hr in ICU due to hospital policy for lactate >4        RLL pneumonia (HCC)  Assessment & Plan  -does not meet criteria for COVID screening  -plan per severe sepsis      Anticipated Hospital stay:  >2 midnights        Quality Measures  Quality-Core Measures   Reviewed items::  Labs reviewed, Medications reviewed and Radiology images reviewed  Dia catheter::  No Dia  DVT prophylaxis pharmacological::  Enoxaparin (Lovenox)  DVT prophylaxis - mechanical:  SCDs    PCP: Pcp Pt States None

## 2020-03-08 NOTE — ASSESSMENT & PLAN NOTE
-does not meet criteria for COVID screening  -Flu negative.  -Likely strep pneumonia given characteristic lobar consolidation on imaging, strep species growing on blood cultures.  -Incentive spirometry, supplemental oxygen as needed, RT per protocol.  - IV ceftriaxone grams given till 3/17/20 for a total of 9 days will be followed up by amoxicillin 1 g 3 times daily for 5 days on discharge

## 2020-03-08 NOTE — ED NOTES
Lab called with critical result of lactic 4.9. Critical lab result read back.   Dr. Sevilla notified of critical lab result.  Critical lab result read back by Dr. Sevilla.

## 2020-03-08 NOTE — ASSESSMENT & PLAN NOTE
Due to RLL infiltrate/PNA  Continue O2 supplementation  Will wean O2 as tolerated  Continue RT protocols  Influenza A/B negative  Low threshold to intubate

## 2020-03-08 NOTE — ASSESSMENT & PLAN NOTE
-Resolved   -IV ceftriaxone grams given till 3/17/20 for a total of 9 days will be followed up by amoxicillin 1 g 3 times daily for 5 days on discharge

## 2020-03-08 NOTE — ASSESSMENT & PLAN NOTE
Likely due to infection and RLL pneumonia  Continue to monitor  Follow BCx2 and sputum cultures  Cont abx

## 2020-03-08 NOTE — ED PROVIDER NOTES
"ED Provider Note    Scribed for Neftali Sevilla M.D. by Ranulfo Castaneda. 3/8/2020  10:13 AM    Primary care provider: Pcp Pt States None  Means of arrival: EMS  History obtained from: patient  History limited by: none    CHIEF COMPLAINT  Chief Complaint   Patient presents with   • Flu Like Symptoms     BIB [facility] for cough, chills, body aches x 2 days. Per EMS room air sat 90%.        HPI  Harvinder Gabriel is a 39 y.o. male who presents to the Emergency Department via EMS with complaints of a moderate productive cough acute onset 2 days ago. He further reports tactile fever, chills, sweats, and moderate body aches. No alleviating or exacerbating factors noted. He denies any vomiting or diarrhea. He does not smoke cigarettes. Per EMS, patient was saturating at 90% on RA, so he is currently on 2L nasal cannula.     REVIEW OF SYSTEMS  Pertinent positives include cough, tactile fever, chills, sweats, and body aches.  Pertinent negatives include no vomiting or diarrhea.    All other systems reviewed and negative. See HPI for further details.     PAST MEDICAL HISTORY  None noted.    SURGICAL HISTORY  patient denies any surgical history    SOCIAL HISTORY  Social History     Tobacco Use   • Smoking status: Never Smoker   Substance Use Topics   • Alcohol use: No   • Drug use: Yes     Comment: Pot and meth      Social History     Substance and Sexual Activity   Drug Use Yes    Comment: Pot and meth       FAMILY HISTORY  History reviewed. No pertinent family history.    CURRENT MEDICATIONS  Home Medications     Reviewed by Lucille Oliver R.N. (Registered Nurse) on 03/08/20 at 1001  Med List Status: Partial   Medication Last Dose Status   cephALEXin (KEFLEX) 500 MG Cap  Flagged for Removal                ALLERGIES  No Known Allergies    PHYSICAL EXAM  VITAL SIGNS: /103   Pulse (!) 133   Temp 36.8 °C (98.3 °F) (Oral)   Resp (!) 26   Ht 1.803 m (5' 11\")   Wt 68 kg (150 lb)   SpO2 92%   BMI 20.92 kg/m² "     Nursing note and vitals reviewed.  Constitutional: Ill-appearing male moderate distress, moaning, diaphoretic, tactile fever.  HENT: Head is normocephalic and atraumatic. Oropharynx is clear and moist without exudate or erythema.   Eyes: Pupils are equal, round, and reactive to light. Conjunctiva are normal.   Cardiovascular: Tachycardic rate and regular rhythm. No murmur heard. Normal radial pulses.  Pulmonary/Chest: Prominent productive cough noted.  Rales noted at the right base, scattered rhonchi throughout.  Tachypnea noted.  Breath sounds normal.   Abdominal: Soft and non-tender. No distention    Musculoskeletal: Extremities exhibit normal range of motion without edema or tenderness.   Neurological: Awake, alert and oriented to person, place, and time. No focal deficits noted.  Skin: Skin is warm and dry. No rash.   Psychiatric: Normal mood and affect. Appropriate for clinical situation.     DIAGNOSTIC STUDIES / PROCEDURES    LABS  Results for orders placed or performed during the hospital encounter of 03/08/20   Lactic acid (lactate)   Result Value Ref Range    Lactic Acid 4.9 (HH) 0.5 - 2.0 mmol/L   CBC WITH DIFFERENTIAL   Result Value Ref Range    WBC 24.7 (H) 4.8 - 10.8 K/uL    RBC 5.73 4.70 - 6.10 M/uL    Hemoglobin 17.6 14.0 - 18.0 g/dL    Hematocrit 51.8 42.0 - 52.0 %    MCV 90.4 81.4 - 97.8 fL    MCH 30.7 27.0 - 33.0 pg    MCHC 34.0 33.7 - 35.3 g/dL    RDW 41.8 35.9 - 50.0 fL    Platelet Count 373 164 - 446 K/uL    MPV 8.5 (L) 9.0 - 12.9 fL    Neutrophils-Polys 96.40 (H) 44.00 - 72.00 %    Lymphocytes 1.80 (L) 22.00 - 41.00 %    Monocytes 0.90 0.00 - 13.40 %    Eosinophils 0.00 0.00 - 6.90 %    Basophils 0.40 0.00 - 1.80 %    Immature Granulocytes 0.50 0.00 - 0.90 %    Nucleated RBC 0.00 /100 WBC    Neutrophils (Absolute) 23.80 (H) 1.82 - 7.42 K/uL    Lymphs (Absolute) 0.44 (L) 1.00 - 4.80 K/uL    Monos (Absolute) 0.23 0.00 - 0.85 K/uL    Eos (Absolute) 0.00 0.00 - 0.51 K/uL    Baso (Absolute) 0.10  0.00 - 0.12 K/uL    Immature Granulocytes (abs) 0.12 (H) 0.00 - 0.11 K/uL    NRBC (Absolute) 0.00 K/uL   COMP METABOLIC PANEL   Result Value Ref Range    Sodium 130 (L) 135 - 145 mmol/L    Potassium 3.9 3.6 - 5.5 mmol/L    Chloride 95 (L) 96 - 112 mmol/L    Co2 21 20 - 33 mmol/L    Anion Gap 14.0 (H) 0.0 - 11.9    Glucose 159 (H) 65 - 99 mg/dL    Bun 15 8 - 22 mg/dL    Creatinine 1.09 0.50 - 1.40 mg/dL    Calcium 9.6 8.5 - 10.5 mg/dL    AST(SGOT) 26 12 - 45 U/L    ALT(SGPT) 21 2 - 50 U/L    Alkaline Phosphatase 93 30 - 99 U/L    Total Bilirubin 1.5 0.1 - 1.5 mg/dL    Albumin 4.3 3.2 - 4.9 g/dL    Total Protein 7.9 6.0 - 8.2 g/dL    Globulin 3.6 (H) 1.9 - 3.5 g/dL    A-G Ratio 1.2 g/dL   URINALYSIS   Result Value Ref Range    Color Yellow     Character Clear     Specific Gravity 1.021 <1.035    Ph 6.0 5.0 - 8.0    Glucose Negative Negative mg/dL    Ketones Negative Negative mg/dL    Protein 30 (A) Negative mg/dL    Bilirubin Negative Negative    Urobilinogen, Urine 1.0 Negative    Nitrite Negative Negative    Leukocyte Esterase Negative Negative    Occult Blood Negative Negative    Micro Urine Req Microscopic    Influenza A/B By PCR (Adult - Flu Only)   Result Value Ref Range    Influenza virus A RNA Negative Negative    Influenza virus B, PCR Negative Negative   URINE MICROSCOPIC (W/UA)   Result Value Ref Range    WBC Rare (A) /hpf    RBC Rare /hpf    Bacteria Negative None /hpf    Sperm Few /hpf   ESTIMATED GFR   Result Value Ref Range    GFR If African American >60 >60 mL/min/1.73 m 2    GFR If Non African American >60 >60 mL/min/1.73 m 2      All labs reviewed by me.    RADIOLOGY  DX-CHEST-PORTABLE (1 VIEW)    (Results Pending)     The radiologist's interpretation of all radiological studies have been reviewed by me.    COURSE & MEDICAL DECISION MAKING  Nursing notes, VS, PMSFHx reviewed in chart.     10:13 AM - Patient seen and examined at bedside. Droplet precautions observed. Patient will be treated with  Tylenol 650 mg, Motrin 600 mg, and LR bolus. Intravenous fluids were administered for concerns for sepsis. Ordered DX-chest, CBC w/ diff, CMP, UA, urine culture, blood cultures, flu, and lactate to evaluate his symptoms. The differential diagnoses include but are not limited to: influenza, pneumonia, vs sepsis.      10:50 AM - Patient's labs evaluated which showed an elevated lactate at 4.9 and WBC at 24.7. Ordered Zithromax injection 500 mg and Unasyn 3 g IVPB.    12:19 PM - Reviewed patient's chest xray which shows possible evidence of pneumonia. Paged [facility] for hospitalization.    12:24 PM I discussed the patient's case and the above findings with [facility] who accepts the patient for hospitalization. Patient was reevaluated at bedside. Discussed lab and radiology results with the patient and informed them of the plan for hospitalization.  He appears slightly improved after receiving IV fluids.       CRITICAL CARE  I provided critical care services, which included medication orders, frequent reevaluations of the patient's condition and response to treatment, ordering and reviewing test results, and discussing the case with various consultants.  The critical care time associated with the care of the patient was 35 minutes. Review chart for interventions. This time is exclusive of any other billable procedures.       DISPOSITION:  Patient will be hospitalized by JANIS [facility] in critical condition.     FINAL IMPRESSION  1. Pneumonia of right lower lobe due to infectious organism (HCC)    2. Sepsis, due to unspecified organism, unspecified whether acute organ dysfunction present (HCC)    3. Hypoxia       The critical care time associated with the care of the patient was 35 minutes.     Ranulfo TURPIN (Donaldo), am scribing for, and in the presence of, Neftali Sevilla M.D..    Electronically signed by: Ranulfo Manzo), 3/8/2020    Neftali TURPIN M.D. personally performed the services described in this  documentation, as scribed by Ranulfo Castaneda in my presence, and it is both accurate and complete. C    The note accurately reflects work and decisions made by me.  Neftali Sevilla M.D.  3/8/2020  4:52 PM

## 2020-03-09 ENCOUNTER — APPOINTMENT (OUTPATIENT)
Dept: RADIOLOGY | Facility: MEDICAL CENTER | Age: 40
DRG: 871 | End: 2020-03-09
Attending: PSYCHIATRY & NEUROLOGY
Payer: MEDICAID

## 2020-03-09 PROBLEM — F15.10 METHAMPHETAMINE ABUSE (HCC): Status: ACTIVE | Noted: 2020-03-09

## 2020-03-09 PROBLEM — R78.81 BACTEREMIA: Status: ACTIVE | Noted: 2020-03-09

## 2020-03-09 PROBLEM — R00.0 TACHYCARDIA: Status: ACTIVE | Noted: 2020-03-09

## 2020-03-09 LAB
ALBUMIN SERPL BCP-MCNC: 3.4 G/DL (ref 3.2–4.9)
ALBUMIN/GLOB SERPL: 1.1 G/DL
ALP SERPL-CCNC: 77 U/L (ref 30–99)
ALT SERPL-CCNC: 17 U/L (ref 2–50)
ANION GAP SERPL CALC-SCNC: 7 MMOL/L (ref 0–11.9)
AST SERPL-CCNC: 24 U/L (ref 12–45)
BASOPHILS # BLD AUTO: 0.9 % (ref 0–1.8)
BASOPHILS # BLD: 0.22 K/UL (ref 0–0.12)
BILIRUB SERPL-MCNC: 0.6 MG/DL (ref 0.1–1.5)
BUN SERPL-MCNC: 12 MG/DL (ref 8–22)
CALCIUM SERPL-MCNC: 8.9 MG/DL (ref 8.5–10.5)
CHLORIDE SERPL-SCNC: 103 MMOL/L (ref 96–112)
CO2 SERPL-SCNC: 23 MMOL/L (ref 20–33)
CREAT SERPL-MCNC: 0.78 MG/DL (ref 0.5–1.4)
EOSINOPHIL # BLD AUTO: 0 K/UL (ref 0–0.51)
EOSINOPHIL NFR BLD: 0 % (ref 0–6.9)
ERYTHROCYTE [DISTWIDTH] IN BLOOD BY AUTOMATED COUNT: 41.7 FL (ref 35.9–50)
GLOBULIN SER CALC-MCNC: 3 G/DL (ref 1.9–3.5)
GLUCOSE SERPL-MCNC: 125 MG/DL (ref 65–99)
HCT VFR BLD AUTO: 46.2 % (ref 42–52)
HGB BLD-MCNC: 15.8 G/DL (ref 14–18)
LYMPHOCYTES # BLD AUTO: 0.84 K/UL (ref 1–4.8)
LYMPHOCYTES NFR BLD: 3.5 % (ref 22–41)
MAGNESIUM SERPL-MCNC: 1.9 MG/DL (ref 1.5–2.5)
MANUAL DIFF BLD: ABNORMAL
MCH RBC QN AUTO: 30.2 PG (ref 27–33)
MCHC RBC AUTO-ENTMCNC: 34.2 G/DL (ref 33.7–35.3)
MCV RBC AUTO: 88.3 FL (ref 81.4–97.8)
METAMYELOCYTES NFR BLD MANUAL: 8.9 %
MONOCYTES # BLD AUTO: 0.43 K/UL (ref 0–0.85)
MONOCYTES NFR BLD AUTO: 1.8 % (ref 0–13.4)
MORPHOLOGY BLD-IMP: NORMAL
NEUTROPHILS # BLD AUTO: 20.38 K/UL (ref 1.82–7.42)
NEUTROPHILS NFR BLD: 69.9 % (ref 44–72)
NEUTS BAND NFR BLD MANUAL: 15 % (ref 0–10)
NRBC # BLD AUTO: 0 K/UL
NRBC BLD-RTO: 0 /100 WBC
PLATELET # BLD AUTO: 290 K/UL (ref 164–446)
PLATELET BLD QL SMEAR: NORMAL
PMV BLD AUTO: 8.7 FL (ref 9–12.9)
POTASSIUM SERPL-SCNC: 4.7 MMOL/L (ref 3.6–5.5)
PROT SERPL-MCNC: 6.4 G/DL (ref 6–8.2)
RBC # BLD AUTO: 5.23 M/UL (ref 4.7–6.1)
RBC BLD AUTO: NORMAL
SODIUM SERPL-SCNC: 133 MMOL/L (ref 135–145)
WBC # BLD AUTO: 24 K/UL (ref 4.8–10.8)

## 2020-03-09 PROCEDURE — 85007 BL SMEAR W/DIFF WBC COUNT: CPT

## 2020-03-09 PROCEDURE — 85027 COMPLETE CBC AUTOMATED: CPT

## 2020-03-09 PROCEDURE — A9270 NON-COVERED ITEM OR SERVICE: HCPCS | Performed by: STUDENT IN AN ORGANIZED HEALTH CARE EDUCATION/TRAINING PROGRAM

## 2020-03-09 PROCEDURE — 700102 HCHG RX REV CODE 250 W/ 637 OVERRIDE(OP): Performed by: STUDENT IN AN ORGANIZED HEALTH CARE EDUCATION/TRAINING PROGRAM

## 2020-03-09 PROCEDURE — 700105 HCHG RX REV CODE 258: Performed by: STUDENT IN AN ORGANIZED HEALTH CARE EDUCATION/TRAINING PROGRAM

## 2020-03-09 PROCEDURE — 770020 HCHG ROOM/CARE - TELE (206)

## 2020-03-09 PROCEDURE — 71250 CT THORAX DX C-: CPT

## 2020-03-09 PROCEDURE — 700111 HCHG RX REV CODE 636 W/ 250 OVERRIDE (IP): Performed by: STUDENT IN AN ORGANIZED HEALTH CARE EDUCATION/TRAINING PROGRAM

## 2020-03-09 PROCEDURE — 99233 SBSQ HOSP IP/OBS HIGH 50: CPT | Mod: GC | Performed by: PSYCHIATRY & NEUROLOGY

## 2020-03-09 PROCEDURE — 80053 COMPREHEN METABOLIC PANEL: CPT

## 2020-03-09 PROCEDURE — 83735 ASSAY OF MAGNESIUM: CPT

## 2020-03-09 RX ORDER — GUAIFENESIN 600 MG/1
600 TABLET, EXTENDED RELEASE ORAL EVERY 12 HOURS
Status: DISCONTINUED | OUTPATIENT
Start: 2020-03-09 | End: 2020-03-17 | Stop reason: HOSPADM

## 2020-03-09 RX ORDER — MAGNESIUM SULFATE 1 G/100ML
1 INJECTION INTRAVENOUS ONCE
Status: COMPLETED | OUTPATIENT
Start: 2020-03-09 | End: 2020-03-09

## 2020-03-09 RX ADMIN — MAGNESIUM SULFATE 1 G: 1 INJECTION INTRAVENOUS at 09:42

## 2020-03-09 RX ADMIN — GUAIFENESIN 600 MG: 600 TABLET, EXTENDED RELEASE ORAL at 18:09

## 2020-03-09 RX ADMIN — ENOXAPARIN SODIUM 40 MG: 100 INJECTION SUBCUTANEOUS at 05:26

## 2020-03-09 RX ADMIN — AZITHROMYCIN MONOHYDRATE 500 MG: 250 TABLET ORAL at 05:26

## 2020-03-09 RX ADMIN — ACETAMINOPHEN 650 MG: 325 TABLET, FILM COATED ORAL at 18:52

## 2020-03-09 RX ADMIN — CEFTRIAXONE SODIUM 2 G: 2 INJECTION, POWDER, FOR SOLUTION INTRAMUSCULAR; INTRAVENOUS at 05:26

## 2020-03-09 ASSESSMENT — ENCOUNTER SYMPTOMS
DIZZINESS: 0
FEVER: 0
ABDOMINAL PAIN: 0
COUGH: 1
NECK PAIN: 1
CHILLS: 0
SINUS PAIN: 0
SORE THROAT: 0
SHORTNESS OF BREATH: 1
HALLUCINATIONS: 0
HEADACHES: 1
EYE PAIN: 0
MYALGIAS: 0
VOMITING: 0
PALPITATIONS: 0
NAUSEA: 0
EYE DISCHARGE: 0
HEMOPTYSIS: 0

## 2020-03-09 ASSESSMENT — COPD QUESTIONNAIRES
COPD SCREENING SCORE: 0
HAVE YOU SMOKED AT LEAST 100 CIGARETTES IN YOUR ENTIRE LIFE: NO/DON'T KNOW
DO YOU EVER COUGH UP ANY MUCUS OR PHLEGM?: NO/ONLY WITH OCCASIONAL COLDS OR INFECTIONS
DURING THE PAST 4 WEEKS HOW MUCH DID YOU FEEL SHORT OF BREATH: NONE/LITTLE OF THE TIME

## 2020-03-09 ASSESSMENT — FIBROSIS 4 INDEX: FIB4 SCORE: 0.8

## 2020-03-09 ASSESSMENT — LIFESTYLE VARIABLES: SUBSTANCE_ABUSE: 1

## 2020-03-09 NOTE — RESPIRATORY CARE
COPD EDUCATION by COPD CLINICAL EDUCATOR  3/9/2020 at 8:08 AM by Adelita Garcia, RRT     Patient reviewed by COPD education team. Patient does not have a history or diagnosis of COPD and is a non-smoker, therefore does not qualify for the COPD program. Admits to Inhaled Meth.

## 2020-03-09 NOTE — DISCHARGE PLANNING
Attempted to speak with patient. Patient mumbling and incoherent. Unable to complete assessment at this time.

## 2020-03-09 NOTE — WOUND TEAM
"In to see pt for wound consult L knee and L ankle. Introduced self and reason for visit. Pt with eyes closed, acknowledged reason for visit and stated, \"You have to come back. We're not doing it now.\" Explained unable to revisit today r/t on unit @ this time and will have to try tomorrow.  "

## 2020-03-09 NOTE — ASSESSMENT & PLAN NOTE
- 3/8/2020: BC- Streptococcus viridans  - 3/10: BC-NGTD  - No evidence of endocarditis  - IV ceftriaxone grams given till 3/17/20 for a total of 9 days will be followed up by amoxicillin 1 g 3 times daily for 5 days on discharge  - Echo 3/16/2020 normal LV function. No signs of endocarditis.

## 2020-03-09 NOTE — ASSESSMENT & PLAN NOTE
-H/o methamphetamine use, last use night prior to presentation to ED 3/8/2020  -Denies past IV drug use or alcohol use.  -Counseled to quit, to be continued as outpatient.  - Echo 3/16/2020 normal LV function EF 55%. No signs of endocarditis.

## 2020-03-09 NOTE — CARE PLAN
Problem: Safety  Goal: Will remain free from injury  Outcome: PROGRESSING AS EXPECTED  Bed alarm on. Call light within reach. In view of nurses station. Education provided.      Problem: Communication  Goal: The ability to communicate needs accurately and effectively will improve  Outcome: PROGRESSING SLOWER THAN EXPECTED   Pt yells out to staff to express needs. Pt kindly educated and frequently reminded on use of call light sitting on bedside table.

## 2020-03-09 NOTE — CARE PLAN
Problem: Respiratory:  Goal: Respiratory status will improve  Outcome: PROGRESSING AS EXPECTED  Titrating O2 down     Problem: Communication  Goal: The ability to communicate needs accurately and effectively will improve  Outcome: PROGRESSING SLOWER THAN EXPECTED  Patient refuses to use call light; yells out from room

## 2020-03-09 NOTE — DISCHARGE SUMMARY
Internal Medicine Discharge Summary  Note Author: .        Admit Date:  3/8/2020       Discharge Date: 3/17/20    Service:   [facility] Internal Medicine blue Team  Attending Physician(s):        Senior Resident(s):    Prasanna Resident(s):   PCP: Pcp Pt States None      Primary Diagnosis:   Sepsis  Acute respiratory failure  Community-acquired pneumonia  Bacteremia    Secondary Diagnoses:                Principal Problem:    RLL pneumonia (HCC) POA: Yes  Active Problems:    Bacteremia POA: Yes    Methamphetamine abuse (HCC) POA: Yes    Sepsis (HCC) POA: Yes    Acute respiratory failure with hypoxia (HCC) POA: Yes    Tachycardia POA: Yes  Resolved Problems:    Lactic acidosis POA: Yes    Leukocytosis POA: Yes      Hospital Summary (Brief Narrative):       39-year-old homeless man with past medical history of methamphetamine use presented with 2-day history of progressive shortness of breath in setting of sepsis and acute respiratory failure secondary to community-acquired pneumonia.  Chest x-ray on admission showed right lower lobar consolidation consistent with pneumonia. Did not meet COVID-19 testing criteria, flu negative.  Admitted to ICU due to initial lactic 4.9, which trended down to 2.9 with antibiotics, fluids, supplemental oxygen.  Received sepsis protocol fluids, initiated on ceftriaxone and azithromycin with improvement.  3/8/2020 blood cultures returned positive for Streptococcus species on preliminary results testing. Ceftriaxone extended to 14-day course (3/8/2020-3/21/2020).  Azithromycin stopped 3/9/2020.  Of note, patient endorsed headache and stiff neck on presentation, but exam benign, alert and oriented x4, afebrile since admission, appears comfortable when not disturbed, with no nuchal rigidity and negative Kernig's and Brudzinski's signs.  Patient also adamantly declined lumbar puncture when discussed in the ICU, especially given likely  Streptococcus species growing on blood cultures, patient understood risk of missing meningitis and possible death by foregoing lumbar puncture.  Given patient's strong preference, no evidence of meningitis on exam, and likelihood of CSF sterilization by antibiotics (initiated 3/8/2020 morning), lumbar puncture not pursued, vancomycin and dexamethasone not started.  Echo deferred as no evidence of endocarditis.  CT chest on 3/9/2020 showed extensive multifocal pneumonia. Transferred to telemetry floor in stable condition on 3/9/2020.  On Telemetry patient improved progressively with no shortness of breath or chest discomfort, echocardiogram done on 3/16/2020 showed normal left ventricular function with no signs of endocarditis blood cultures on 3/8/2020 grew Streptococcus viridans and repeat blood cultures on 3/10/2021 are no growth to date.  Patient was continued on ceftriaxone on IV 2 g daily for his streptococcal viridans bacteremia, patient was found to be stable on the medical floor and was insistent on discharge on 3/17/2020.  Patient will be discharged for home in a stable condition on amoxicillin 1 g 3 times daily for 5 days.  Patient advised to follow-up with primary care practitioner in 2 week time.  Patient scheduling called, to make the necessary appointment for new primary care practitioner.      Patient /Hospital Summary (Details -- Problem Oriented) :          Methamphetamine abuse (HCC)  Assessment & Plan  -H/o methamphetamine use, last use night prior to presentation to ED 3/8/2020  -Denies past IV drug use or alcohol use.  -Counseled to quit, to be continued as outpatient.  - Echo 3/16/2020 normal LV function EF 55%. No signs of endocarditis.    Bacteremia  Assessment & Plan  - 3/8/2020: BC- Streptococcus viridans  - 3/10: BC-NGTD  - No evidence of endocarditis  - Continue ceftriaxone 2 g IV for total of 14-days (3/8/2020-3/21/2020)  - Echo 3/16/2020 normal LV function. No signs of  "endocarditis.    * RLL pneumonia (HCC)  Assessment & Plan  -does not meet criteria for COVID screening  -Flu negative.  -Likely strep pneumonia given characteristic lobar consolidation on imaging, strep species growing on blood cultures.  -Incentive spirometry, supplemental oxygen as needed, RT per protocol.  - IV ceftriaxone grams given till 3/17/20 for a total of 9 days will be followed up by amoxicillin 1 g 3 times daily for 5 days on discharge    Lactic acidosis-resolved as of 3/15/2020  Assessment & Plan  -Resolved  -In setting of sepsis and acute respiratory failure.      Tachycardia  Assessment & Plan  -Sinus tachycardia in setting of sepsis syndrome and methamphetamine intoxication  -Will Monitor    Acute respiratory failure with hypoxia (HCC)  Assessment & Plan  -Resolved  -In setting of community-acquired pneumonia.  -See \"RLL pneumonia\" for details.    Sepsis (HCC)  Assessment & Plan  -Resolved   -Continue IV ceftriaxone 2 g for Streptococcus viridans bacteremia until 3/21/2020    Leukocytosis-resolved as of 3/15/2020  Assessment & Plan  -Resolving   -In setting of sepsis, likely dehydration.  -Antibiotics, encourage oral intake.  -Monitor.      Consultants:     Critical care    Procedures:        None    Imaging/ Testing:      EC-ECHOCARDIOGRAM COMPLETE W/O CONT   Final Result      EC-ECHOCARDIOGRAM COMPLETE W/ CONT         DX-CHEST-2 VIEWS   Final Result      Ill-defined opacity in the right perihilar region, improved since prior study, likely resolving pneumonia.      CT-CHEST (THORAX) W/O   Final Result      1.  Extensive multifocal pneumonia primarily involving RIGHT upper and lower lobes.   2.  Small RIGHT pleural effusion.   3.  No pneumothorax.               DX-CHEST-PORTABLE (1 VIEW)   Final Result      Consolidation within the right lung base.            Discharge Medications:         Medication Reconciliation: Completed       Medication List      START taking these medications      Instructions "   amoxicillin 500 MG Caps  Commonly known as:  AMOXIL   Take 2 Caps by mouth 3 times a day.  Dose:  1,000 mg     guaiFENesin  MG Tb12  Commonly known as:  MUCINEX   Take 1 Tab by mouth every 12 hours.  Dose:  600 mg            Can use .DISCHARGEMEDSLIST if going to another facility         Disposition: For home    Diet: Regular    Activity: As tolerated    Instructions:      Patient advised to follow-up with primary care practitioner in 2 weeks time.  The patient was instructed to return to the ER in the event of worsening symptoms. I have counseled the patient on the importance of compliance and the patient has agreed to proceed with all medical recommendations and follow up plan indicated above.   The patient understands that all medications come with benefits and risks. Risks may include permanent injury or death and these risks can be minimized with close reassessment and monitoring.        Primary Care Provider: Patient being scheduled by patient care .   will call patient once appointment is set up.    Discharge summary faxed to primary care provider:  Deferred  Copy of discharge summary given to the patient: Completed      Follow up appointment details :      Follow-up with primary care practitioner in 2 weeks time.    Pending Studies:        None    Time spent on discharge day patient visit, preparing discharge paperwork and arranging for patient follow up.    Summary of follow up issues:   Methamphetamine cessation counseling    Discharge Time (Minutes) : 45 minutes  Hospital Course Type:  Inpatient Stay >2 midnights      Condition on Discharge stable.  ______________________________________________________________________    Interval history/exam for day of discharge:    - Overnight events: No overnight events  - Subjective: He is feeling much better but very frustrated and verbally agressive. I have discussed that he is the final decision maker about anything we provide  to him. He is breathing well on room air, no shortness of breath.  - Vitals: Saturating 94% on room air. Temperature 97.5, BP is stable. RR is 17.  - Pertinent physical: Sub-optimal examination, patient was not cooperative with directions. Right lower lung mild crackles.  - Labs/Cultures: WBC normalized 9.6, No electrolyte abnormalities or anemia.  - Imaging: Echocardiography showing no LV dysfunction, endocarditis. EF 55%.  - Consults: No consults   - Interventions: No interventions  - Med changes: No changes for now. Will continue rocephin IV until 3/21/2020.  - Daytime events No events.  - Discharge plan: To home.       Vitals:    03/16/20 1525 03/16/20 1920 03/17/20 0335 03/17/20 0800   BP: 125/97 157/95 124/86 143/93   Pulse: 100 (!) 103 84 60   Resp: 18 17 17 18   Temp: 36.2 °C (97.1 °F) 36.7 °C (98.1 °F) 36.7 °C (98.1 °F) 36.8 °C (98.2 °F)   TempSrc: Temporal Temporal Temporal Temporal   SpO2: 95% 97% 96% 96%   Weight:  73.4 kg (161 lb 13.1 oz)     Height:         Weight/BMI: Body mass index is 22.57 kg/m².  Pulse Oximetry: 96 %, O2 (LPM): 0, O2 Delivery Device: None - Room Air    Physical Exam  Vitals signs reviewed.   Constitutional:       General: He is not in acute distress.     Appearance: He is not ill-appearing.   HENT:      Head: Normocephalic.      Nose: Nose normal. No congestion or rhinorrhea.      Mouth/Throat:      Mouth: Mucous membranes are moist.   Eyes:      Extraocular Movements: Extraocular movements intact.   Neck:      Musculoskeletal: Neck supple.   Cardiovascular:      Rate and Rhythm: Normal rate and regular rhythm.      Pulses: Normal pulses.      Heart sounds: Normal heart sounds.   Pulmonary:      Effort: Pulmonary effort is normal. No respiratory distress.      Breath sounds: No Rales present. No wheezing or rhonchi.   Abdominal:      Palpations: Abdomen is soft.      Tenderness: There is no abdominal tenderness. There is no rebound.   Musculoskeletal:      Right lower leg: No  edema.      Left lower leg: No edema.   Skin:     Findings: No rash.   Neurological:      General: No focal deficit present.      Mental Status: He is oriented to person, place, and time.      Cranial Nerves: No cranial nerve deficit.      Motor: No weakness.   Psychiatric:         Mood and Affect: Mood normal.      Most Recent Labs:    Lab Results   Component Value Date/Time    WBC 13.5 (H) 03/17/2020 08:48 AM    RBC 5.41 03/17/2020 08:48 AM    HEMOGLOBIN 16.2 03/17/2020 08:48 AM    HEMATOCRIT 48.9 03/17/2020 08:48 AM    MCV 90.4 03/17/2020 08:48 AM    MCH 29.9 03/17/2020 08:48 AM    MCHC 33.1 (L) 03/17/2020 08:48 AM    MPV 8.4 (L) 03/17/2020 08:48 AM    NEUTSPOLYS 74.10 (H) 03/17/2020 08:48 AM    LYMPHOCYTES 16.20 (L) 03/17/2020 08:48 AM    MONOCYTES 5.90 03/17/2020 08:48 AM    EOSINOPHILS 0.40 03/17/2020 08:48 AM    BASOPHILS 0.60 03/17/2020 08:48 AM      Lab Results   Component Value Date/Time    SODIUM 137 03/17/2020 08:48 AM    POTASSIUM 4.2 03/17/2020 08:48 AM    CHLORIDE 103 03/17/2020 08:48 AM    CO2 28 03/17/2020 08:48 AM    GLUCOSE 115 (H) 03/17/2020 08:48 AM    BUN 12 03/17/2020 08:48 AM    CREATININE 0.97 03/17/2020 08:48 AM      Lab Results   Component Value Date/Time    ALTSGPT 45 03/17/2020 08:48 AM    ASTSGOT 26 03/17/2020 08:48 AM    ALKPHOSPHAT 76 03/17/2020 08:48 AM    TBILIRUBIN 0.3 03/17/2020 08:48 AM    ALBUMIN 4.0 03/17/2020 08:48 AM    GLOBULIN 3.2 03/17/2020 08:48 AM    INR 1.02 02/13/2018 07:03 AM     Lab Results   Component Value Date/Time    PROTHROMBTM 13.1 02/13/2018 07:03 AM    INR 1.02 02/13/2018 07:03 AM        EC-ECHOCARDIOGRAM COMPLETE W/O CONT   Final Result      EC-ECHOCARDIOGRAM COMPLETE W/ CONT         DX-CHEST-2 VIEWS   Final Result      Ill-defined opacity in the right perihilar region, improved since prior study, likely resolving pneumonia.      CT-CHEST (THORAX) W/O   Final Result      1.  Extensive multifocal pneumonia primarily involving RIGHT upper and lower lobes.   2.   Small RIGHT pleural effusion.   3.  No pneumothorax.               DX-CHEST-PORTABLE (1 VIEW)   Final Result      Consolidation within the right lung base.        A computerized dictation system may have been used for this note.    Despite review, there may be some spelling or grammatical errors.  Joyce Garza M.D.

## 2020-03-09 NOTE — ASSESSMENT & PLAN NOTE
-Resolving   -In setting of sepsis, likely dehydration.  -Antibiotics, encourage oral intake.  -Monitor.

## 2020-03-09 NOTE — PROGRESS NOTES
[facility] ICU Transfer Note                                                                                         ICU Admit Date: 3/8/2020       ICU Discharge Date:  03/09/20        Primary Diagnosis:   Sepsis (HCC)        ICU Course Summary (Brief Narrative):  39-year-old homeless man with past medical history of methamphetamine use presented with 2-day history of progressive shortness of breath in setting of sepsis and acute respiratory failure secondary to community-acquired pneumonia.  Chest x-ray on admission showed right lower lobar consolidation consistent with pneumonia. Did not meet COVID-19 testing criteria, flu negative.  Admitted to ICU due to initial lactic 4.9, which trended down to 2.9 with antibiotics, fluids, supplemental oxygen.  Received sepsis protocol fluids, initiated on ceftriaxone and azithromycin with improvement.  3/8/2020 blood cultures returned positive for Streptococcus species on preliminary results.  Ceftriaxone extended to 10-day course (3/8/2020-3/17/2020), pending susceptibilities.  Azithromycin stopped 3/9/2020.  Of note, patient endorses headache and stiff neck when asked, but exam benign, alert and oriented x4, afebrile since admission, appears comfortable when not disturbed, with no nuchal rigidity and negative Kernig's and Brudzinski signs.  Patient also adamantly declined lumbar puncture when discussed, especially given likely Streptococcus species growing on blood cultures, patient understands risk of missing meningitis and possible death by foregoing lumbar puncture.  Given patient's strong preference, no evidence of meningitis on exam, and likelihood of CSF sterilization by antibiotics (initiated 3/8/2020 morning), lumbar puncture not pursued, vancomycin and dexamethasone not started.  Echo deferred as no evidence of endocarditis.    Does remain in sinus tachycardia, likely due to both infection and methamphetamine intoxication - last use night prior to presentation to ED on  3/8/2020, approximately 10-hour half-life.  Denies other illicit drugs or alcohol use.    Transferred to telemetry in stable condition.  Pending CT chest to further clarify given significant right lower lobar consolidation on chest X-ray.     Important Events in the ICU:   - Central Line: None   - Intubation: None   - Pressors: None   - Dia catheter: None  - Tube feeding: Regular  - Antibiotics: Ceftriaxone (3/8/2020-3/17/2020), azithromycin (3/8/2020-3/9/2020)  - Other procedures: None         Labs and imaging studies to be continued with their indications:  - CBC: Yes; monitor leukocytosis  - CMP or BMP: Yes; optimization of electrolytes  - Magnesium: Yes; optimization of electrolytes  - Phosphorus: Yes; optimization of electrolytes  - Chest X-ray: No; unless having worsening respiratory status  - Other studies: CT chest        Things to follow:   - CT chest, HIV test, sputum culture pending  - 3/8/2020 blood culture final results and susceptibilities, repeat blood culture 3/10/2020  - Adjust antibiotics if indicated based on blood culture results  - Wean off supplemental oxygen as tolerated  - Monitor tachycardia and possible methamphetamine withdrawal  - Consider echo if have further suspicion of endocarditis - no evidence at this time

## 2020-03-10 ENCOUNTER — DOCUMENTATION (OUTPATIENT)
Dept: HEALTH INFORMATION MANAGEMENT | Facility: OTHER | Age: 40
End: 2020-03-10

## 2020-03-10 LAB
ANION GAP SERPL CALC-SCNC: 10 MMOL/L (ref 0–11.9)
BACTERIA UR CULT: NORMAL
BASOPHILS # BLD AUTO: 0.4 % (ref 0–1.8)
BASOPHILS # BLD: 0.06 K/UL (ref 0–0.12)
BUN SERPL-MCNC: 12 MG/DL (ref 8–22)
CALCIUM SERPL-MCNC: 10.1 MG/DL (ref 8.5–10.5)
CHLORIDE SERPL-SCNC: 99 MMOL/L (ref 96–112)
CO2 SERPL-SCNC: 26 MMOL/L (ref 20–33)
CREAT SERPL-MCNC: 1.01 MG/DL (ref 0.5–1.4)
EOSINOPHIL # BLD AUTO: 0.25 K/UL (ref 0–0.51)
EOSINOPHIL NFR BLD: 1.6 % (ref 0–6.9)
ERYTHROCYTE [DISTWIDTH] IN BLOOD BY AUTOMATED COUNT: 41.3 FL (ref 35.9–50)
GLUCOSE SERPL-MCNC: 157 MG/DL (ref 65–99)
HCT VFR BLD AUTO: 50.2 % (ref 42–52)
HGB BLD-MCNC: 16.9 G/DL (ref 14–18)
HIV 1+2 AB+HIV1 P24 AG SERPL QL IA: NON REACTIVE
IMM GRANULOCYTES # BLD AUTO: 0.1 K/UL (ref 0–0.11)
IMM GRANULOCYTES NFR BLD AUTO: 0.6 % (ref 0–0.9)
LACTATE BLD-SCNC: 2 MMOL/L (ref 0.5–2)
LYMPHOCYTES # BLD AUTO: 1.14 K/UL (ref 1–4.8)
LYMPHOCYTES NFR BLD: 7.1 % (ref 22–41)
MAGNESIUM SERPL-MCNC: 2.3 MG/DL (ref 1.5–2.5)
MCH RBC QN AUTO: 30.1 PG (ref 27–33)
MCHC RBC AUTO-ENTMCNC: 33.7 G/DL (ref 33.7–35.3)
MCV RBC AUTO: 89.5 FL (ref 81.4–97.8)
MONOCYTES # BLD AUTO: 0.67 K/UL (ref 0–0.85)
MONOCYTES NFR BLD AUTO: 4.2 % (ref 0–13.4)
NEUTROPHILS # BLD AUTO: 13.84 K/UL (ref 1.82–7.42)
NEUTROPHILS NFR BLD: 86.1 % (ref 44–72)
NRBC # BLD AUTO: 0 K/UL
NRBC BLD-RTO: 0 /100 WBC
PHOSPHATE SERPL-MCNC: 2.8 MG/DL (ref 2.5–4.5)
PLATELET # BLD AUTO: 361 K/UL (ref 164–446)
PMV BLD AUTO: 8.8 FL (ref 9–12.9)
POTASSIUM SERPL-SCNC: 4.4 MMOL/L (ref 3.6–5.5)
RBC # BLD AUTO: 5.61 M/UL (ref 4.7–6.1)
SIGNIFICANT IND 70042: NORMAL
SITE SITE: NORMAL
SODIUM SERPL-SCNC: 135 MMOL/L (ref 135–145)
SOURCE SOURCE: NORMAL
WBC # BLD AUTO: 16.1 K/UL (ref 4.8–10.8)

## 2020-03-10 PROCEDURE — 85025 COMPLETE CBC W/AUTO DIFF WBC: CPT

## 2020-03-10 PROCEDURE — 700111 HCHG RX REV CODE 636 W/ 250 OVERRIDE (IP): Performed by: STUDENT IN AN ORGANIZED HEALTH CARE EDUCATION/TRAINING PROGRAM

## 2020-03-10 PROCEDURE — 84100 ASSAY OF PHOSPHORUS: CPT

## 2020-03-10 PROCEDURE — 83605 ASSAY OF LACTIC ACID: CPT

## 2020-03-10 PROCEDURE — 94760 N-INVAS EAR/PLS OXIMETRY 1: CPT

## 2020-03-10 PROCEDURE — 700105 HCHG RX REV CODE 258: Performed by: STUDENT IN AN ORGANIZED HEALTH CARE EDUCATION/TRAINING PROGRAM

## 2020-03-10 PROCEDURE — 99232 SBSQ HOSP IP/OBS MODERATE 35: CPT | Mod: GC | Performed by: INTERNAL MEDICINE

## 2020-03-10 PROCEDURE — 99231 SBSQ HOSP IP/OBS SF/LOW 25: CPT | Performed by: INTERNAL MEDICINE

## 2020-03-10 PROCEDURE — 83735 ASSAY OF MAGNESIUM: CPT

## 2020-03-10 PROCEDURE — A9270 NON-COVERED ITEM OR SERVICE: HCPCS | Performed by: STUDENT IN AN ORGANIZED HEALTH CARE EDUCATION/TRAINING PROGRAM

## 2020-03-10 PROCEDURE — 700102 HCHG RX REV CODE 250 W/ 637 OVERRIDE(OP): Performed by: STUDENT IN AN ORGANIZED HEALTH CARE EDUCATION/TRAINING PROGRAM

## 2020-03-10 PROCEDURE — 36415 COLL VENOUS BLD VENIPUNCTURE: CPT

## 2020-03-10 PROCEDURE — 87040 BLOOD CULTURE FOR BACTERIA: CPT | Mod: 91

## 2020-03-10 PROCEDURE — 770020 HCHG ROOM/CARE - TELE (206)

## 2020-03-10 PROCEDURE — 80048 BASIC METABOLIC PNL TOTAL CA: CPT

## 2020-03-10 PROCEDURE — 87389 HIV-1 AG W/HIV-1&-2 AB AG IA: CPT

## 2020-03-10 RX ADMIN — GUAIFENESIN 600 MG: 600 TABLET, EXTENDED RELEASE ORAL at 07:24

## 2020-03-10 RX ADMIN — GUAIFENESIN 600 MG: 600 TABLET, EXTENDED RELEASE ORAL at 17:16

## 2020-03-10 RX ADMIN — ENOXAPARIN SODIUM 40 MG: 100 INJECTION SUBCUTANEOUS at 07:33

## 2020-03-10 RX ADMIN — CEFTRIAXONE SODIUM 2 G: 2 INJECTION, POWDER, FOR SOLUTION INTRAMUSCULAR; INTRAVENOUS at 07:26

## 2020-03-10 ASSESSMENT — PATIENT HEALTH QUESTIONNAIRE - PHQ9
SUM OF ALL RESPONSES TO PHQ9 QUESTIONS 1 AND 2: 0
1. LITTLE INTEREST OR PLEASURE IN DOING THINGS: NOT AT ALL
2. FEELING DOWN, DEPRESSED, IRRITABLE, OR HOPELESS: NOT AT ALL

## 2020-03-10 ASSESSMENT — ENCOUNTER SYMPTOMS
COUGH: 1
SHORTNESS OF BREATH: 0
SPUTUM PRODUCTION: 0
VOMITING: 0
EYE PAIN: 0
WEIGHT LOSS: 0
HEARTBURN: 0
HEADACHES: 1
CHILLS: 0
BLOOD IN STOOL: 0
ABDOMINAL PAIN: 0
NAUSEA: 0
PHOTOPHOBIA: 0
DOUBLE VISION: 0
BACK PAIN: 0
SEIZURES: 0
DEPRESSION: 0
BLURRED VISION: 0
CLAUDICATION: 0
EYE DISCHARGE: 0
SENSORY CHANGE: 0
DIZZINESS: 0
MEMORY LOSS: 0
SHORTNESS OF BREATH: 1
FOCAL WEAKNESS: 0
HEMOPTYSIS: 0
MYALGIAS: 0
HEADACHES: 0
FEVER: 0
PALPITATIONS: 0
COUGH: 0
LOSS OF CONSCIOUSNESS: 0
SORE THROAT: 0
SINUS PAIN: 0
SPEECH CHANGE: 0
NECK PAIN: 1
HALLUCINATIONS: 0

## 2020-03-10 ASSESSMENT — COGNITIVE AND FUNCTIONAL STATUS - GENERAL
SUGGESTED CMS G CODE MODIFIER DAILY ACTIVITY: CI
CLIMB 3 TO 5 STEPS WITH RAILING: A LITTLE
DRESSING REGULAR LOWER BODY CLOTHING: A LITTLE
MOBILITY SCORE: 23
SUGGESTED CMS G CODE MODIFIER MOBILITY: CI
DAILY ACTIVITIY SCORE: 23

## 2020-03-10 ASSESSMENT — LIFESTYLE VARIABLES: SUBSTANCE_ABUSE: 1

## 2020-03-10 NOTE — PROGRESS NOTES
Pulmonary Progress note          Date & Time:   3/10/2020   8:14 AM       Patient ID:    Name:             Harvinder Gabriel   YOB: 1980  Age:                 39 y.o.  male   MRN:               [id number]    Diagnosis:  Sepsis  Acute respiratory failure  Community-acquired pneumonia  Bacteremia    ID:  39-year-old homeless man with past medical history of methamphetamine use presented with 2-day history of progressive shortness of breath in setting of acute respiratory failure secondary to community-acquired pneumonia.    Interval Events:  3/9,   Patient often uncooperative, endorses headache and stiff neck when asked, but exam benign, with no nuchal rigidity and negative Kernig's and Burzynski signs, alert and oriented x4, afebrile since admission.  Seen resting comfortably when not disturbed.  Patient also adamantly declined lumbar puncture when discussed, especially given likely strep species growing on blood cultures, patient understands risk of missing meningitis and possible death by foregoing lumbar puncture.  Given patient's strong preference, no evidence of meningitis on exam, and likelihood of CSF sterilization by antibiotics (initiated 3/8/2020 morning), will not pursue lumbar puncture at this time.  - Lactic trended down with fluids and antibiotics, now below 4.  - Remains tachycardic, sinus tachycardia on monitoring, likely secondary to methamphetamine intoxication, as last use shortly prior to presentation to ED 3/8/2020.  Patient denies alcohol or other drug use.  3/10, Chart review from the past 24 hours includes imaging, laboratory studies, vital signs and notes available.  Pertinent data for today's visit includes Transferred to telemetry.  On room air, comfortable, reluctant to participate with examination, but pulmonary status much better.  Pulmonary will sign off, call if needed    Review of Systems   Constitutional: Negative for chills and fever.   HENT: Negative for sinus pain  and sore throat.    Eyes: Negative for pain and discharge.   Respiratory: Positive for cough and shortness of breath. Negative for hemoptysis.    Cardiovascular: Negative for chest pain, palpitations and leg swelling.   Gastrointestinal: Negative for abdominal pain, nausea and vomiting.   Genitourinary: Negative for dysuria and hematuria.   Musculoskeletal: Positive for neck pain. Negative for myalgias.   Skin: Negative for itching and rash.   Neurological: Positive for headaches. Negative for dizziness.   Psychiatric/Behavioral: Positive for substance abuse. Negative for hallucinations.       VITALS:  Pulse: 98  Blood Pressure: 132/96       Temp  Av.9 °C (98.5 °F)  Min: 36.1 °C (97 °F)  Max: 37.7 °C (99.9 °F)         Physical Exam:  Physical Exam   Constitutional: He is oriented to person, place, and time.   Irritable, but seen resting comfortably when not disturbed.  In no acute distress.   HENT:   Head: Normocephalic and atraumatic.   Eyes: Conjunctivae and EOM are normal.   Neck: Normal range of motion. Neck supple.   No nuchal rigidity.   Cardiovascular: Regular rhythm. Exam reveals no gallop and no friction rub.   No murmur heard.  Mildly tachycardic.   Pulmonary/Chest: Effort normal. No respiratory distress. He has no wheezes. He has no rales.   Somewhat diminished breath sounds, but no significant wheezing, rhonchi, crackles.   Abdominal: Soft. There is no abdominal tenderness. There is no rebound and no guarding.   Musculoskeletal: Normal range of motion.         General: No tenderness or edema.   Neurological: He is alert and oriented to person, place, and time. No cranial nerve deficit.   Brudzinski and Kernig signs negative.   Skin: Skin is warm and dry. No rash noted.   No Janeway lesions, Osler nodes   Psychiatric:   Irritable, often not cooperative, but no overt aggression.          Consultants:  Critical care    Procedures:  N/A    HemoDynamics:  Pulse: 98 Blood Pressure: 132/96       Respiratory:     Respiration: 18, Pulse Oximetry: 97 %    Chest Tube Drains:        Neuro:    Fluids:  Intake/Output       03/08/20 0700 - 03/09/20 0659 03/09/20 0700 - 03/10/20 0659 03/10/20 0700 - 03/11/20 0659      3929-6680 4473-2612 Total 8595-6755 7640-6105 Total 0700-1859 1900-0659 Total       Intake    P.O.  480  1930 2410  825  1020 1845  --  -- --    P.O. 480 1930 2410 825 1020 1845 -- -- --    I.V.  293.8  1500 1793.8  225  -- 225  --  -- --    Volume (mL) (lactated ringers infusion) 293.8 1500 1793.8 125 -- 125 -- -- --    Volume (mL) (Magnesium Sulfate in D5W IVPB premix 1 g) -- -- -- 100 -- 100 -- -- --    IV Piggyback  2753.3  113.3 2866.7  --  -- --  --  -- --    Volume (mL) (lactated ringers infusion (BOLUS)) 2500 -- 2500 -- -- -- -- -- --    Volume (mL) (cefTRIAXone (ROCEPHIN) 2 g in  mL IVPB) 100 113.3 213.3 -- -- -- -- -- --    Volume (mL) (ampicillin/sulbactam (UNASYN) 3 g in  mL IVPB) 153.3 -- 153.3 -- -- -- -- -- --    Total Intake 3527.1 3543.3 7070.4 1050 1020 2070 -- -- --       Output    Urine  800  1700 2500  1855  -- 1855  --  -- --    Number of Times Voided 1 x 4 x 5 x 4 x 3 x 7 x -- -- --    Urine Void (mL) 800 1700 2500 1855 -- 1855 -- -- --    Stool  --  -- --  --  -- --  --  -- --    Number of Times Stooled -- -- -- 0 x -- 0 x -- -- --    Total Output 800 1700 2500 1855 -- 1855 -- -- --       Net I/O     2727.1 1843.3 4570.4 -805 1020 215 -- -- --        Weight: 74.5 kg (164 lb 3.9 oz)  Recent Labs     03/08/20  1010 03/09/20  0405   SODIUM 130* 133*   POTASSIUM 3.9 4.7   CHLORIDE 95* 103   CO2 21 23   BUN 15 12   CREATININE 1.09 0.78   MAGNESIUM  --  1.9   CALCIUM 9.6 8.9     Body mass index is 22.91 kg/m².    GI/Nutrition:  Recent Labs     03/08/20  1010 03/09/20  0405   ALTSGPT 21 17   ASTSGOT 26 24   ALKPHOSPHAT 93 77   TBILIRUBIN 1.5 0.6   GLUCOSE 159* 125*       Heme:  Recent Labs     03/08/20  1010 03/09/20  0405   RBC 5.73 5.23   HEMOGLOBIN 17.6 15.8    HEMATOCRIT 51.8 46.2   PLATELETCT 373 290       Infectious Disease:  Temp  Av.9 °C (98.5 °F)  Min: 36.1 °C (97 °F)  Max: 37.7 °C (99.9 °F)  Recent Labs     20  1010 20  0405   WBC 24.7* 24.0*   NEUTSPOLYS 96.40* 69.90   LYMPHOCYTES 1.80* 3.50*   MONOCYTES 0.90 1.80   EOSINOPHILS 0.00 0.00   BASOPHILS 0.40 0.90   ASTSGOT 26 24   ALTSGPT 21 17   ALKPHOSPHAT 93 77   TBILIRUBIN 1.5 0.6       Medications:  • cefTRIAXone (ROCEPHIN) IV  2 g Stopped (03/10/20 2658)   • guaiFENesin ER  600 mg     • senna-docusate  2 Tab      And   • polyethylene glycol/lytes  1 Packet      And   • magnesium hydroxide  30 mL      And   • bisacodyl  10 mg     • Respiratory Therapy Consult       • enoxaparin  40 mg     • acetaminophen  650 mg          Imaging:  CT-CHEST (THORAX) W/O   Final Result      1.  Extensive multifocal pneumonia primarily involving RIGHT upper and lower lobes.   2.  Small RIGHT pleural effusion.   3.  No pneumothorax.               DX-CHEST-PORTABLE (1 VIEW)   Final Result      Consolidation within the right lung base.          Assessment and plan    * Sepsis (HCC)- (present on admission)  Assessment & Plan  This is Sepsis Present on admission  SIRS criteria identified on my evaluation include: Fever, with temperature greater than 101 deg F, Tachycardia, with heart rate greater than 90 BPM, Tachypnea, with respirations greater than 20 per minute and Leukocyosis, with WBC greater than 12,000  Source is RLL pneumonia  Sepsis protocol initiated  Fluid resuscitation ordered per protocol  IV antibiotics as appropriate for source of sepsis  While organ dysfunction may be noted elsewhere in this problem list or in the chart, degree of organ dysfunction does not meet CMS criteria for severe sepsis    Continue C3/azithro            Lactic acidosis- (present on admission)  Assessment & Plan  Likely due to hypoxia and sepsis  Continue to trend    Acute respiratory failure with hypoxia (HCC)- (present on  admission)  Assessment & Plan  Due to RLL infiltrate/PNA  Continue O2 supplementation  Will wean O2 as tolerated  Continue RT protocols  Influenza A/B negative  Low threshold to intubate    RLL pneumonia (HCC)- (present on admission)  Assessment & Plan  Likely community-acquired  Continue Ceftriaxone  Follow BCx2 and sputum cultures      Leukocytosis- (present on admission)  Assessment & Plan  Likely due to infection and RLL pneumonia  Continue to monitor  Follow BCx2 and sputum cultures  Cont abx      Rubina Sommer MD , FCCP, Pulmonary Service

## 2020-03-10 NOTE — CARE PLAN
Problem: Communication  Goal: The ability to communicate needs accurately and effectively will improve  Outcome: PROGRESSING AS EXPECTED     Problem: Safety  Goal: Will remain free from injury  Outcome: PROGRESSING AS EXPECTED  Goal: Will remain free from falls  Outcome: PROGRESSING AS EXPECTED     Problem: Infection  Goal: Will remain free from infection  Outcome: PROGRESSING AS EXPECTED     Problem: Venous Thromboembolism (VTW)/Deep Vein Thrombosis (DVT) Prevention:  Goal: Patient will participate in Venous Thrombosis (VTE)/Deep Vein Thrombosis (DVT)Prevention Measures  Outcome: PROGRESSING AS EXPECTED     Problem: Bowel/Gastric:  Goal: Normal bowel function is maintained or improved  Outcome: PROGRESSING AS EXPECTED  Goal: Will not experience complications related to bowel motility  Outcome: PROGRESSING AS EXPECTED     Problem: Knowledge Deficit  Goal: Knowledge of disease process/condition, treatment plan, diagnostic tests, and medications will improve  Outcome: PROGRESSING AS EXPECTED  Goal: Knowledge of the prescribed therapeutic regimen will improve  Outcome: PROGRESSING AS EXPECTED     Problem: Discharge Barriers/Planning  Goal: Patient's continuum of care needs will be met  Outcome: PROGRESSING AS EXPECTED     Problem: Respiratory:  Goal: Respiratory status will improve  Outcome: PROGRESSING AS EXPECTED     Problem: Fluid Volume:  Goal: Will maintain balanced intake and output  Outcome: PROGRESSING AS EXPECTED     Problem: Psychosocial Needs:  Goal: Level of anxiety will decrease  Outcome: PROGRESSING AS EXPECTED

## 2020-03-10 NOTE — DISCHARGE PLANNING
Patient is eligible for Medicaid Meds to Beds at discharge if they have coverage with [place] Medicaid, Medicaid FFS, Medicaid HMO (\[facility]\""), or [place]. This service is provided through the [facility] if orders are received by the pharmacy prior to 4pm Monday through Friday excluding holidays. Preferred pharmacy has been changed to [facility]. Please call [phone] prior to discharge.

## 2020-03-10 NOTE — PROGRESS NOTES
"Lab enters room this AM to complete lab draw. Patient screaming and cursing at , refusing labs-- primary reason, he \"wants to sleep.\" RN to bedside to talk to patient to urge him to complete labs, education provided. Patient still refusing despite education. Patient states \"just let me fucking sleep, this is bullshit\" !  "

## 2020-03-10 NOTE — WOUND TEAM
Wound consult placed on 3/8/20 regarding left ankle and knee. Pt agitated and refusing, wants to sleep. Discussed with [facility] team and bedside RNGloria. [facility] ok with wound team holding off on consult and allowing patient to sleep today. Will round back this afternoon to see if pt more compliant.

## 2020-03-10 NOTE — PROGRESS NOTES
Daily Progress Note:     Date of Service: 3/10/2020  Primary Team: [facility] IM Blue Team   Attending: Dr. Siegel  Senior Resident: Dr. Garza  Intern: Dr. Shankar  Contact:  [phone]    Chief Complaint:   Dyspnea    Subjective:  A 39-year-old male with PMHx of methamphetamine use has been admitted with chief complaint of dyspnea and was found to have sepsis likely secondary to RLL pneumonia and Streptococcus bacteremia.  He was transferred from ICU to wards on 3/9 in stable condition.    -No acute events overnight, patient was afebrile  -Today a.m., patient was noncooperative and refusing labs but after conversation he agreed but remained irritable on asking questions otherwise calm and sleeping; did not complain of fever/chills, dyspnea, chest pain, palpitations, neck pain or headache  -Tachycardic; other vital signs are within normal limits. Saturation is above 90% on room air  -On exam, mildly diminished breath sounds all over lung otherwise unremarkable; no nuchal rigidity or murmur on exam  -Lactic acid trended down to 2; leukocytosis is resolving. Electrolytes are within normal limits  -Blood culture 2/2 from 3/8/2020 are growing Streptococcus; will continue to follow  -Continue ceftriaxone 1 g IV; 10-day course of ceftriaxone (3/8/2020-3/17/2020), pending susceptibilities.  -will follow repeat blood cultures 3/10/2020      Consultants/Specialty:  Critical care    Review of Systems:  Review of Systems   Constitutional: Negative for chills, fever, malaise/fatigue and weight loss.   HENT: Negative for congestion, ear pain, nosebleeds and sore throat.    Eyes: Negative for blurred vision, double vision, photophobia and pain.   Respiratory: Negative for cough, hemoptysis, sputum production and shortness of breath.    Cardiovascular: Negative for chest pain, palpitations, claudication and leg swelling.   Gastrointestinal: Negative for abdominal pain, blood in stool, heartburn, nausea and vomiting.    Genitourinary: Negative for dysuria and hematuria.   Musculoskeletal: Negative for back pain, joint pain and myalgias.   Skin: Negative for itching and rash.   Neurological: Negative for dizziness, sensory change, speech change, focal weakness, seizures, loss of consciousness and headaches.   Psychiatric/Behavioral: Negative for depression and memory loss.       Objective Data:   Physical Exam:   Vitals:   Temp:  [35.6 °C (96 °F)-37.7 °C (99.9 °F)] 36.9 °C (98.5 °F)  Pulse:  [] 107  Resp:  [18-19] 18  BP: (128-132)/(88-96) 128/93  SpO2:  [94 %-97 %] 95 %       Physical Exam  Constitutional:       General: He is not in acute distress.     Appearance: Normal appearance. He is normal weight. He is not ill-appearing, toxic-appearing or diaphoretic.      Comments: Non cooperative and sleeping most of the time   HENT:      Head: Normocephalic and atraumatic.      Nose: Nose normal. No congestion or rhinorrhea.      Mouth/Throat:      Pharynx: Oropharynx is clear. No oropharyngeal exudate or posterior oropharyngeal erythema.   Eyes:      Extraocular Movements: Extraocular movements intact.      Conjunctiva/sclera: Conjunctivae normal.      Pupils: Pupils are equal, round, and reactive to light.   Neck:      Musculoskeletal: Normal range of motion and neck supple. No neck rigidity.   Cardiovascular:      Rate and Rhythm: Normal rate and regular rhythm.      Pulses: Normal pulses.      Heart sounds: Normal heart sounds. No murmur. No friction rub. No gallop.    Pulmonary:      Effort: Pulmonary effort is normal. No respiratory distress.      Breath sounds: Normal breath sounds. No wheezing or rales.      Comments: Mildly diminished breath sounds  Abdominal:      General: Abdomen is flat. There is no distension.      Palpations: Abdomen is soft.      Tenderness: There is no abdominal tenderness. There is no guarding.   Musculoskeletal: Normal range of motion.         General: No swelling, tenderness or deformity.    Lymphadenopathy:      Cervical: No cervical adenopathy.   Skin:     Findings: Lesion (2-3 cm sized, round, healing skin lesion aroung left ankle) present.   Neurological:      General: No focal deficit present.      Mental Status: He is alert.   Psychiatric:      Comments: Generally calm and sleeping; becomes irritated on asking questions.          Labs:     Recent Labs     03/08/20  1010 03/09/20  0405 03/10/20  0842   WBC 24.7* 24.0* 16.1*   RBC 5.73 5.23 5.61   HEMOGLOBIN 17.6 15.8 16.9   HEMATOCRIT 51.8 46.2 50.2   MCV 90.4 88.3 89.5   MCH 30.7 30.2 30.1   RDW 41.8 41.7 41.3   PLATELETCT 373 290 361   MPV 8.5* 8.7* 8.8*   NEUTSPOLYS 96.40* 69.90 86.10*   LYMPHOCYTES 1.80* 3.50* 7.10*   MONOCYTES 0.90 1.80 4.20   EOSINOPHILS 0.00 0.00 1.60   BASOPHILS 0.40 0.90 0.40   RBCMORPHOLO  --  Normal  --      Recent Labs     03/08/20  1010 03/09/20  0405 03/10/20  0842   SODIUM 130* 133* 135   POTASSIUM 3.9 4.7 4.4   CHLORIDE 95* 103 99   CO2 21 23 26   GLUCOSE 159* 125* 157*   BUN 15 12 12       Imaging:     CT-CHEST (THORAX) W/O   Final Result      1.  Extensive multifocal pneumonia primarily involving RIGHT upper and lower lobes.   2.  Small RIGHT pleural effusion.   3.  No pneumothorax.               DX-CHEST-PORTABLE (1 VIEW)   Final Result      Consolidation within the right lung base.          Assessment and Plan:    * Sepsis (HCC)- (present on admission)  Assessment & Plan  -Resolved   This is Severe Sepsis Present on admission  SIRS criteria identified on my evaluation include: Tachycardia, with heart rate greater than 90 BPM, Tachypnea, with respirations greater than 20 per minute and Leukocyosis, with WBC greater than 12,000  Source of infection is RLL pneumonia  Clinical indicators of end organ dysfunction include Lactate >2 mmol/L (18.0 mg/dL)  IV antibiotics as appropriate for source of sepsis  Reassessment: I have reassessed the patient's hemodynamic status  -Improved with antibiotics, fluids-tachycardia  "likely has component of methamphetamine intoxication as well.  - See \"RLL pneumonia\" for details.        Bacteremia- (present on admission)  Assessment & Plan  -3/8/2020 Bcx growing likely Strep species on preliminary results, likely secondary to Strep pneumonia.  -Patient endorses headache and stiff neck when asked, but exam benign, with no nuchal rigidity and negative Kernig's and Brudzinski's signs, alert and oriented x4, afebrile since admission.    -Patient also adamantly declined lumbar puncture when discussed, especially given likely strep species growing on blood cultures, patient understands risk of missing meningitis and possible death by foregoing lumbar puncture.  Given patient's strong preference, no evidence of meningitis on exam, and likelihood of CSF sterilization by antibiotics (initiated 3/8/2020 morning), will not pursue lumbar puncture or add vancomycin or dexamethasone at this time.  -No evidence of endocarditis, defer echo at this time.  -10-day course of ceftriaxone (3/8/2020-3/17/2020), pending susceptibilities.  - Repeat blood cultures 3/10/2020.  -Pending HIV test.    Lactic acidosis- (present on admission)  Assessment & Plan  -Resolved  -In setting of sepsis and acute respiratory failure.      Acute respiratory failure with hypoxia (HCC)- (present on admission)  Assessment & Plan  -Resolved  -In setting of community-acquired pneumonia.  -See \"RLL pneumonia\" for details.    RLL pneumonia (HCC)- (present on admission)  Assessment & Plan  -does not meet criteria for COVID screening  -Flu negative.  -Likely strep pneumonia given characteristic lobar consolidation on imaging, strep species growing on blood cultures.  -Incentive spirometry, supplemental oxygen as needed, RT per protocol.  -On ceftriaxone for concurrent pneumonia and strep bacteremia (3/8/2020-3/17/2020), stopped azithromycin (3/8/2020-3/10/2020).  -Improving.    Tachycardia- (present on admission)  Assessment & Plan  -Sinus " tachycardia in setting of sepsis syndrome and methamphetamine intoxication, last use night prior to presentation to ED 3/8/2020  -Monitor.    Methamphetamine abuse (HCC)- (present on admission)  Assessment & Plan  -Patient endorses smoking methamphetamine, last use night prior to presentation to ED 3/8/2020.  Denies past IV drug use or alcohol use.  -monitor for withdrawal symptoms.  -Counseled, to be continued as outpatient.    Leukocytosis- (present on admission)  Assessment & Plan  -Resolving   -In setting of sepsis, likely dehydration.  -Antibiotics, encourage oral intake.  -Monitor.

## 2020-03-11 LAB
ALBUMIN SERPL BCP-MCNC: 3.7 G/DL (ref 3.2–4.9)
ALBUMIN/GLOB SERPL: 0.9 G/DL
ALP SERPL-CCNC: 92 U/L (ref 30–99)
ALT SERPL-CCNC: 25 U/L (ref 2–50)
ANION GAP SERPL CALC-SCNC: 12 MMOL/L (ref 0–11.9)
AST SERPL-CCNC: 16 U/L (ref 12–45)
BACTERIA BLD CULT: ABNORMAL
BASOPHILS # BLD AUTO: 0.7 % (ref 0–1.8)
BASOPHILS # BLD: 0.1 K/UL (ref 0–0.12)
BILIRUB SERPL-MCNC: 0.3 MG/DL (ref 0.1–1.5)
BUN SERPL-MCNC: 13 MG/DL (ref 8–22)
CALCIUM SERPL-MCNC: 9.8 MG/DL (ref 8.5–10.5)
CHLORIDE SERPL-SCNC: 104 MMOL/L (ref 96–112)
CO2 SERPL-SCNC: 23 MMOL/L (ref 20–33)
CREAT SERPL-MCNC: 0.98 MG/DL (ref 0.5–1.4)
EOSINOPHIL # BLD AUTO: 0.19 K/UL (ref 0–0.51)
EOSINOPHIL NFR BLD: 1.4 % (ref 0–6.9)
ERYTHROCYTE [DISTWIDTH] IN BLOOD BY AUTOMATED COUNT: 40.7 FL (ref 35.9–50)
ETEST SENSITIVITY ETEST: NORMAL
GLOBULIN SER CALC-MCNC: 4 G/DL (ref 1.9–3.5)
GLUCOSE SERPL-MCNC: 153 MG/DL (ref 65–99)
HCT VFR BLD AUTO: 49.5 % (ref 42–52)
HGB BLD-MCNC: 16.9 G/DL (ref 14–18)
IMM GRANULOCYTES # BLD AUTO: 0.24 K/UL (ref 0–0.11)
IMM GRANULOCYTES NFR BLD AUTO: 1.8 % (ref 0–0.9)
LYMPHOCYTES # BLD AUTO: 1.55 K/UL (ref 1–4.8)
LYMPHOCYTES NFR BLD: 11.5 % (ref 22–41)
MCH RBC QN AUTO: 30 PG (ref 27–33)
MCHC RBC AUTO-ENTMCNC: 34.1 G/DL (ref 33.7–35.3)
MCV RBC AUTO: 87.9 FL (ref 81.4–97.8)
MONOCYTES # BLD AUTO: 0.98 K/UL (ref 0–0.85)
MONOCYTES NFR BLD AUTO: 7.3 % (ref 0–13.4)
NEUTROPHILS # BLD AUTO: 10.42 K/UL (ref 1.82–7.42)
NEUTROPHILS NFR BLD: 77.3 % (ref 44–72)
NRBC # BLD AUTO: 0 K/UL
NRBC BLD-RTO: 0 /100 WBC
PLATELET # BLD AUTO: 450 K/UL (ref 164–446)
PMV BLD AUTO: 8.7 FL (ref 9–12.9)
POTASSIUM SERPL-SCNC: 4.2 MMOL/L (ref 3.6–5.5)
PROT SERPL-MCNC: 7.7 G/DL (ref 6–8.2)
RBC # BLD AUTO: 5.63 M/UL (ref 4.7–6.1)
SIGNIFICANT IND 70042: ABNORMAL
SIGNIFICANT IND 70042: ABNORMAL
SITE SITE: ABNORMAL
SITE SITE: ABNORMAL
SODIUM SERPL-SCNC: 139 MMOL/L (ref 135–145)
SOURCE SOURCE: ABNORMAL
SOURCE SOURCE: ABNORMAL
WBC # BLD AUTO: 13.5 K/UL (ref 4.8–10.8)

## 2020-03-11 PROCEDURE — 700105 HCHG RX REV CODE 258: Performed by: STUDENT IN AN ORGANIZED HEALTH CARE EDUCATION/TRAINING PROGRAM

## 2020-03-11 PROCEDURE — 770020 HCHG ROOM/CARE - TELE (206)

## 2020-03-11 PROCEDURE — 99232 SBSQ HOSP IP/OBS MODERATE 35: CPT | Mod: GC | Performed by: INTERNAL MEDICINE

## 2020-03-11 PROCEDURE — A9270 NON-COVERED ITEM OR SERVICE: HCPCS | Performed by: STUDENT IN AN ORGANIZED HEALTH CARE EDUCATION/TRAINING PROGRAM

## 2020-03-11 PROCEDURE — 85025 COMPLETE CBC W/AUTO DIFF WBC: CPT

## 2020-03-11 PROCEDURE — 700111 HCHG RX REV CODE 636 W/ 250 OVERRIDE (IP): Performed by: STUDENT IN AN ORGANIZED HEALTH CARE EDUCATION/TRAINING PROGRAM

## 2020-03-11 PROCEDURE — 80053 COMPREHEN METABOLIC PANEL: CPT

## 2020-03-11 PROCEDURE — 700102 HCHG RX REV CODE 250 W/ 637 OVERRIDE(OP): Performed by: STUDENT IN AN ORGANIZED HEALTH CARE EDUCATION/TRAINING PROGRAM

## 2020-03-11 PROCEDURE — 36415 COLL VENOUS BLD VENIPUNCTURE: CPT

## 2020-03-11 RX ADMIN — CEFTRIAXONE SODIUM 1 G: 1 INJECTION, POWDER, FOR SOLUTION INTRAMUSCULAR; INTRAVENOUS at 05:31

## 2020-03-11 RX ADMIN — GUAIFENESIN 600 MG: 600 TABLET, EXTENDED RELEASE ORAL at 05:31

## 2020-03-11 RX ADMIN — GUAIFENESIN 600 MG: 600 TABLET, EXTENDED RELEASE ORAL at 17:05

## 2020-03-11 RX ADMIN — ENOXAPARIN SODIUM 40 MG: 100 INJECTION SUBCUTANEOUS at 05:31

## 2020-03-11 ASSESSMENT — LIFESTYLE VARIABLES
TOTAL SCORE: 0
HAVE YOU EVER FELT YOU SHOULD CUT DOWN ON YOUR DRINKING: NO
HOW MANY TIMES IN THE PAST YEAR HAVE YOU HAD 5 OR MORE DRINKS IN A DAY: 0
ON A TYPICAL DAY WHEN YOU DRINK ALCOHOL HOW MANY DRINKS DO YOU HAVE: 0
HAVE PEOPLE ANNOYED YOU BY CRITICIZING YOUR DRINKING: NO
CONSUMPTION TOTAL: NEGATIVE
TOTAL SCORE: 0
EVER FELT BAD OR GUILTY ABOUT YOUR DRINKING: NO
EVER HAD A DRINK FIRST THING IN THE MORNING TO STEADY YOUR NERVES TO GET RID OF A HANGOVER: NO
ALCOHOL_USE: NO
TOTAL SCORE: 0
AVERAGE NUMBER OF DAYS PER WEEK YOU HAVE A DRINK CONTAINING ALCOHOL: 0

## 2020-03-11 ASSESSMENT — ENCOUNTER SYMPTOMS
CHILLS: 0
COUGH: 0
SORE THROAT: 0
FOCAL WEAKNESS: 0
FEVER: 0
MEMORY LOSS: 0
MYALGIAS: 0
DIZZINESS: 0
SPUTUM PRODUCTION: 0
SEIZURES: 0
CLAUDICATION: 0
LOSS OF CONSCIOUSNESS: 0
VOMITING: 0
SHORTNESS OF BREATH: 0
HEARTBURN: 0
PALPITATIONS: 0
HEADACHES: 0
WEIGHT LOSS: 0
DEPRESSION: 0
ABDOMINAL PAIN: 0
EYE PAIN: 0
BLURRED VISION: 0
PHOTOPHOBIA: 0
DOUBLE VISION: 0
SPEECH CHANGE: 0
NAUSEA: 0
SENSORY CHANGE: 0
BLOOD IN STOOL: 0
HEMOPTYSIS: 0
BACK PAIN: 0

## 2020-03-11 ASSESSMENT — FIBROSIS 4 INDEX: FIB4 SCORE: 0.28

## 2020-03-11 NOTE — CARE PLAN
Problem: Hyperinflation:  Goal: Prevent or improve atelectasis  Outcome: PROGRESSING AS EXPECTED     Pt on RA, IS: 2250cc

## 2020-03-11 NOTE — CARE PLAN
Problem: Venous Thromboembolism (VTW)/Deep Vein Thrombosis (DVT) Prevention:  Goal: Patient will participate in Venous Thrombosis (VTE)/Deep Vein Thrombosis (DVT)Prevention Measures  Outcome: PROGRESSING AS EXPECTED  Note: Lovenox administered appropriately per orders. SCD's refused, patient encouraged to ambulate frequently.      Problem: Knowledge Deficit  Goal: Knowledge of disease process/condition, treatment plan, diagnostic tests, and medications will improve  Outcome: PROGRESSING AS EXPECTED  Note: Discussed plan of care and nursing interventions with patient. Encouraged all questions and concerns. Patient verbalized understanding, all needs met. Call light within reach.

## 2020-03-11 NOTE — PROGRESS NOTES
Bedside report received from ARNOLD Dennis. Assumed care of pt. Pt awake, laying in bed. A/Ox4, VSS. No concerns, complaints or distress. Pt educated to call before getting out of bed. POC reviewed and white board updated. Tele box on,  on the monitor. Call light in reach. Bed locked in lowest position with 2 upper bed rails up. Bed alarm on.

## 2020-03-11 NOTE — WOUND TEAM
[facility]  Inpatient Services  Initial Wound and Skin Care Evaluation    Admission Date: 3/8/2020     Last order of IP CONSULT TO WOUND CARE was found on 3/8/2020 from Hospital Encounter on 3/8/2020     HPI, PMH, SH: Reviewed    Unit where seen by Wound Team: [id number]     WOUND CONSULT/FOLLOW UP RELATED TO:  Left ankle and Left Knee     Self Report / Pain Level:  Pain with attempting to remove dry skin.       OBJECTIVE:  Pt lying in bed with left ankle and knee open to air    WOUND TYPE, LOCATION, CHARACTERISTICS (Pressure Injuries: location, stage, POA or date identified)        Vascular:    GABRIEL:   No results found.    Lab Values:    Lab Results   Component Value Date/Time    WBC 13.5 (H) 03/11/2020 09:45 AM    RBC 5.63 03/11/2020 09:45 AM    HEMOGLOBIN 16.9 03/11/2020 09:45 AM    HEMATOCRIT 49.5 03/11/2020 09:45 AM      Culture Results show:  No results found for this or any previous visit (from the past 720 hour(s)).    INTERVENTIONS BY WOUND TEAM:  Chart and images reviewed. Discussed with bedside RN. This RN in with bedside RN. Pt hesitant but agreeable. Left ankle assessed. Pt has scar tissue present with dry flaky scabbed skin over top which he refused to allow this RN to remove. Measurements and pictures obtained and pt was agreeable to sween cream. Sween cream applied. Scar to left knee was also visualized, pt refused images stating it was an old scar. Wound consult completed no further follow up indicated. Recommend bedside team continue with moisturizer application.     Interdisciplinary consultation: Patient, Bedside RN (Rhoda)    EVALUATION: Pt is an older gentleman with a history of drug use who was admitted with sepsis secondary to pneumonia. Pt has an old scar to his left knee which is open to air. Pt has a small area of scar tissue with dried skin/scab attached which pt states started as a bug bite. The wound is healed and would benefit from daily moisturizer application as pt is  hypersensitive and does not want the dry skin removed.    Goals: Steady decrease in wound area and depth weekly.    NURSING PLAN OF CARE ORDERS (X):    Dressing changes: See Dressing Care orders:   Skin care: See Skin Care orders: X  Rectal tube care: See Rectal Tube Care orders:   Other orders:    RSKIN:   CURRENTLY IN PLACE (X), APPLIED THIS VISIT (A), ORDERED (O):   Q shift Danielito:  X  Q shift pressure point assessments:  X  Pressure redistribution mattress X           Low Airloss          Bariatric IRENE         Bariatric foam           Heel float boots     Heel Silicone dressing        Float Heels off Bed with Pillows               Barrier wipes         Barrier Cream         Barrier paste          Sacral silicone dressing         Silicone O2 tubing         Anchorfast         Cannula fixation Device (Tender )          Gray Foam Ear protectors           Trach with Optifoam split foam                 Waffle cushion        Waffle Overlay   O      Rectal tube or BMS    Purwick/Condom Cath          Antifungal tx      Interdry          Reposition q 2 hours  Self mobile in bed     Up to chair  X      Ambulate X     PT/OT        Dietician        Diabetes Education      PO   X  TF     TPN     NPO   # days   Other        WOUND TEAM PLAN OF CARE:   Dressing changes by wound team:          Follow up 1-2 times weekly:               Follow up 3 times weekly:                NPWT change 3 times weekly:     Follow up as needed:  X, Reconsult with any new or worsening wounds     Other (explain):     Anticipated discharge plans:   LTACH:        SNF/Rehab:                  Home Care:           Outpatient Wound Center:            Self Care: X, pt has no advanced wound care needs

## 2020-03-11 NOTE — PROGRESS NOTES
Bedside report received from day RN. Patient's plan of care reviewed. Patient found resting in bed, A&Ox4. VSS, on RA. No signs of distress, declines pain at this time. All needs met. Safety precautions in place. Tele box on. Bed in lowest/locked position. Call light and personal belongings within reach. Will continue to monitor.

## 2020-03-11 NOTE — PROGRESS NOTES
Daily Progress Note:     Date of Service: 3/11/2020  Primary Team: [facility] Blue Team   Attending: Dr. Siegel  Senior Resident: Dr. Garza  Intern: Dr. Shankar  Contact:  [phone]    Chief Complaint:   Dyspnea    Subjective:  A 39-year-old male with PMHx of methamphetamine use has been admitted with chief complaint of dyspnea and was found to have sepsis likely secondary to RLL pneumonia and Streptococcus bacteremia.  He was transferred from ICU to wards on 3/9 in stable condition.    -No acute events overnight, patient was afebrile  -Today a.m., patient was responsive and calm; denied any complaints.   -Tachycardic; other vital signs are within normal limits. Saturation is above 90% on room air  -On exam, mildly diminished breath sounds all over lung otherwise unremarkable; no nuchal rigidity or murmur on exam  -WBC is trending down; electrolytes and kidney functions are WNL; non-reactive HIV Ab test  -Blood culture 2/2 from 3/8/2020 are growing Viridans Streptococcus; will continue to follow  -repeat blood cultures 3/10/2020 are NGTD  -Continue ceftriaxone 2 g IV; 14-day course of ceftriaxone (3/8/2020-3/21/2020)      Consultants/Specialty:  Critical care    Review of Systems:  Review of Systems   Constitutional: Negative for chills, fever, malaise/fatigue and weight loss.   HENT: Negative for congestion, ear pain, nosebleeds and sore throat.    Eyes: Negative for blurred vision, double vision, photophobia and pain.   Respiratory: Negative for cough, hemoptysis, sputum production and shortness of breath.    Cardiovascular: Negative for chest pain, palpitations, claudication and leg swelling.   Gastrointestinal: Negative for abdominal pain, blood in stool, heartburn, nausea and vomiting.   Genitourinary: Negative for dysuria and hematuria.   Musculoskeletal: Negative for back pain, joint pain and myalgias.   Skin: Negative for itching and rash.   Neurological: Negative for dizziness, sensory change, speech  change, focal weakness, seizures, loss of consciousness and headaches.   Psychiatric/Behavioral: Negative for depression and memory loss.       Objective Data:   Physical Exam:   Vitals:   Temp:  [35.7 °C (96.3 °F)-36.4 °C (97.5 °F)] 36.4 °C (97.5 °F)  Pulse:  [100-110] 106  Resp:  [16-18] 17  BP: (110-140)/(66-92) 113/79  SpO2:  [94 %-98 %] 97 %       Physical Exam  Constitutional:       General: He is not in acute distress.     Appearance: Normal appearance. He is normal weight. He is not ill-appearing, toxic-appearing or diaphoretic.      Comments: Non cooperative and sleeping most of the time   HENT:      Head: Normocephalic and atraumatic.      Nose: Nose normal. No congestion or rhinorrhea.      Mouth/Throat:      Pharynx: Oropharynx is clear. No oropharyngeal exudate or posterior oropharyngeal erythema.   Eyes:      Extraocular Movements: Extraocular movements intact.      Conjunctiva/sclera: Conjunctivae normal.      Pupils: Pupils are equal, round, and reactive to light.   Neck:      Musculoskeletal: Normal range of motion and neck supple. No neck rigidity.   Cardiovascular:      Rate and Rhythm: Normal rate and regular rhythm.      Pulses: Normal pulses.      Heart sounds: Normal heart sounds. No murmur. No friction rub. No gallop.    Pulmonary:      Effort: Pulmonary effort is normal. No respiratory distress.      Breath sounds: Normal breath sounds. No wheezing or rales.      Comments: Mildly diminished breath sounds  Abdominal:      General: Abdomen is flat. There is no distension.      Palpations: Abdomen is soft.      Tenderness: There is no abdominal tenderness. There is no guarding.   Musculoskeletal: Normal range of motion.         General: No swelling, tenderness or deformity.   Lymphadenopathy:      Cervical: No cervical adenopathy.   Skin:     Findings: Lesion (2-3 cm sized, round, healing skin lesion aroung left ankle) present.   Neurological:      General: No focal deficit present.      Mental  "Status: He is alert.   Psychiatric:      Comments: Generally calm and sleeping; becomes irritated on asking questions.          Labs:     Recent Labs     03/09/20  0405 03/10/20  0842 03/11/20  0945   WBC 24.0* 16.1* 13.5*   RBC 5.23 5.61 5.63   HEMOGLOBIN 15.8 16.9 16.9   HEMATOCRIT 46.2 50.2 49.5   MCV 88.3 89.5 87.9   MCH 30.2 30.1 30.0   RDW 41.7 41.3 40.7   PLATELETCT 290 361 450*   MPV 8.7* 8.8* 8.7*   NEUTSPOLYS 69.90 86.10* 77.30*   LYMPHOCYTES 3.50* 7.10* 11.50*   MONOCYTES 1.80 4.20 7.30   EOSINOPHILS 0.00 1.60 1.40   BASOPHILS 0.90 0.40 0.70   RBCMORPHOLO Normal  --   --      Recent Labs     03/09/20  0405 03/10/20  0842 03/11/20  0945   SODIUM 133* 135 139   POTASSIUM 4.7 4.4 4.2   CHLORIDE 103 99 104   CO2 23 26 23   GLUCOSE 125* 157* 153*   BUN 12 12 13       Imaging:     CT-CHEST (THORAX) W/O   Final Result      1.  Extensive multifocal pneumonia primarily involving RIGHT upper and lower lobes.   2.  Small RIGHT pleural effusion.   3.  No pneumothorax.               DX-CHEST-PORTABLE (1 VIEW)   Final Result      Consolidation within the right lung base.          Assessment and Plan:    * Sepsis (HCC)- (present on admission)  Assessment & Plan  -Resolved   This is Severe Sepsis Present on admission  SIRS criteria identified on my evaluation include: Tachycardia, with heart rate greater than 90 BPM, Tachypnea, with respirations greater than 20 per minute and Leukocyosis, with WBC greater than 12,000  Source of infection is RLL pneumonia  Clinical indicators of end organ dysfunction include Lactate >2 mmol/L (18.0 mg/dL)  IV antibiotics as appropriate for source of sepsis  Reassessment: I have reassessed the patient's hemodynamic status  -Improved with antibiotics, fluids-tachycardia likely has component of methamphetamine intoxication as well.  - See \"RLL pneumonia\" for details.        Bacteremia- (present on admission)  Assessment & Plan  -3/8/2020 Bcx growing likely Strep species on preliminary " "results, likely secondary to Strep pneumonia.  -Patient endorses headache and stiff neck when asked, but exam benign, with no nuchal rigidity and negative Kernig's and Brudzinski's signs, alert and oriented x4, afebrile since admission.    -Patient also adamantly declined lumbar puncture when discussed, especially given likely strep species growing on blood cultures, patient understands risk of missing meningitis and possible death by foregoing lumbar puncture.  Given patient's strong preference, no evidence of meningitis on exam, and likelihood of CSF sterilization by antibiotics (initiated 3/8/2020 morning), will not pursue lumbar puncture or add vancomycin or dexamethasone at this time.  -No evidence of endocarditis, defer echo at this time.  -Blood culture 2/2 from 3/8/2020 are growing Viridans Streptococcus; will continue to follow  -repeat blood cultures 3/10/2020 are NGTD  -Continue ceftriaxone 2 g IV; 14-day course of ceftriaxone (3/8/2020-3/21/2020)    Lactic acidosis- (present on admission)  Assessment & Plan  -Resolved  -In setting of sepsis and acute respiratory failure.      Acute respiratory failure with hypoxia (HCC)- (present on admission)  Assessment & Plan  -Resolved  -In setting of community-acquired pneumonia.  -See \"RLL pneumonia\" for details.    RLL pneumonia (HCC)- (present on admission)  Assessment & Plan  -does not meet criteria for COVID screening  -Flu negative.  -Likely strep pneumonia given characteristic lobar consolidation on imaging, strep species growing on blood cultures.  -Incentive spirometry, supplemental oxygen as needed, RT per protocol.  -On ceftriaxone for concurrent pneumonia and strep bacteremia (3/8/2020-3/17/2020), stopped azithromycin (3/8/2020-3/10/2020).  -Improving.    Tachycardia- (present on admission)  Assessment & Plan  -Sinus tachycardia in setting of sepsis syndrome and methamphetamine intoxication, last use night prior to presentation to ED " 3/8/2020  -Monitor.    Methamphetamine abuse (HCC)- (present on admission)  Assessment & Plan  -Patient endorses smoking methamphetamine, last use night prior to presentation to ED 3/8/2020.  Denies past IV drug use or alcohol use.  -monitor for withdrawal symptoms.  -Counseled, to be continued as outpatient.    Leukocytosis- (present on admission)  Assessment & Plan  -Resolving   -In setting of sepsis, likely dehydration.  -Antibiotics, encourage oral intake.  -Monitor.

## 2020-03-11 NOTE — CARE PLAN
Problem: Safety  Goal: Will remain free from injury  Outcome: PROGRESSING AS EXPECTED   Safety precautions and fall prevention interventions in place. Fall prevention education provided, pt verbalized understanding. Bed in low/locked position, treaded socks on pt, call bell is within reach. Appropriate devices provided with ambulation assistance. Pt calls appropriately for help.      Problem: Knowledge Deficit  Goal: Knowledge of disease process/condition, treatment plan, diagnostic tests, and medications will improve  Outcome: PROGRESSING AS EXPECTED   Pt and family updated on POC, including medications, treatment and discharge planning. Questions answered as needed, pt and family verbalized understanding. Encouraged to voice further questions/concerns.

## 2020-03-12 LAB
ANION GAP SERPL CALC-SCNC: 13 MMOL/L (ref 7–16)
BASOPHILS # BLD AUTO: 0.9 % (ref 0–1.8)
BASOPHILS # BLD: 0.16 K/UL (ref 0–0.12)
BUN SERPL-MCNC: 15 MG/DL (ref 8–22)
CALCIUM SERPL-MCNC: 9.8 MG/DL (ref 8.5–10.5)
CHLORIDE SERPL-SCNC: 100 MMOL/L (ref 96–112)
CO2 SERPL-SCNC: 22 MMOL/L (ref 20–33)
CREAT SERPL-MCNC: 0.93 MG/DL (ref 0.5–1.4)
EOSINOPHIL # BLD AUTO: 0.23 K/UL (ref 0–0.51)
EOSINOPHIL NFR BLD: 1.3 % (ref 0–6.9)
ERYTHROCYTE [DISTWIDTH] IN BLOOD BY AUTOMATED COUNT: 40.7 FL (ref 35.9–50)
GLUCOSE SERPL-MCNC: 134 MG/DL (ref 65–99)
HCT VFR BLD AUTO: 50.8 % (ref 42–52)
HGB BLD-MCNC: 17.4 G/DL (ref 14–18)
IMM GRANULOCYTES # BLD AUTO: 0.75 K/UL (ref 0–0.11)
IMM GRANULOCYTES NFR BLD AUTO: 4.2 % (ref 0–0.9)
LYMPHOCYTES # BLD AUTO: 2.1 K/UL (ref 1–4.8)
LYMPHOCYTES NFR BLD: 11.8 % (ref 22–41)
MCH RBC QN AUTO: 30.1 PG (ref 27–33)
MCHC RBC AUTO-ENTMCNC: 34.3 G/DL (ref 33.7–35.3)
MCV RBC AUTO: 87.9 FL (ref 81.4–97.8)
MONOCYTES # BLD AUTO: 1.5 K/UL (ref 0–0.85)
MONOCYTES NFR BLD AUTO: 8.4 % (ref 0–13.4)
NEUTROPHILS # BLD AUTO: 13.05 K/UL (ref 1.82–7.42)
NEUTROPHILS NFR BLD: 73.4 % (ref 44–72)
NRBC # BLD AUTO: 0 K/UL
NRBC BLD-RTO: 0 /100 WBC
PLATELET # BLD AUTO: 496 K/UL (ref 164–446)
PMV BLD AUTO: 8.6 FL (ref 9–12.9)
POTASSIUM SERPL-SCNC: 4.7 MMOL/L (ref 3.6–5.5)
RBC # BLD AUTO: 5.78 M/UL (ref 4.7–6.1)
SODIUM SERPL-SCNC: 135 MMOL/L (ref 135–145)
WBC # BLD AUTO: 17.8 K/UL (ref 4.8–10.8)

## 2020-03-12 PROCEDURE — 700105 HCHG RX REV CODE 258: Performed by: STUDENT IN AN ORGANIZED HEALTH CARE EDUCATION/TRAINING PROGRAM

## 2020-03-12 PROCEDURE — 700105 HCHG RX REV CODE 258

## 2020-03-12 PROCEDURE — 85025 COMPLETE CBC W/AUTO DIFF WBC: CPT

## 2020-03-12 PROCEDURE — 80048 BASIC METABOLIC PNL TOTAL CA: CPT

## 2020-03-12 PROCEDURE — 99232 SBSQ HOSP IP/OBS MODERATE 35: CPT | Mod: GC | Performed by: INTERNAL MEDICINE

## 2020-03-12 PROCEDURE — 770020 HCHG ROOM/CARE - TELE (206)

## 2020-03-12 PROCEDURE — 700102 HCHG RX REV CODE 250 W/ 637 OVERRIDE(OP): Performed by: STUDENT IN AN ORGANIZED HEALTH CARE EDUCATION/TRAINING PROGRAM

## 2020-03-12 PROCEDURE — 700111 HCHG RX REV CODE 636 W/ 250 OVERRIDE (IP): Performed by: STUDENT IN AN ORGANIZED HEALTH CARE EDUCATION/TRAINING PROGRAM

## 2020-03-12 PROCEDURE — A9270 NON-COVERED ITEM OR SERVICE: HCPCS | Performed by: STUDENT IN AN ORGANIZED HEALTH CARE EDUCATION/TRAINING PROGRAM

## 2020-03-12 RX ORDER — SODIUM CHLORIDE 9 MG/ML
INJECTION, SOLUTION INTRAVENOUS
Status: COMPLETED
Start: 2020-03-12 | End: 2020-03-12

## 2020-03-12 RX ADMIN — ACETAMINOPHEN 650 MG: 325 TABLET, FILM COATED ORAL at 12:00

## 2020-03-12 RX ADMIN — GUAIFENESIN 600 MG: 600 TABLET, EXTENDED RELEASE ORAL at 05:24

## 2020-03-12 RX ADMIN — CEFTRIAXONE SODIUM 1 G: 1 INJECTION, POWDER, FOR SOLUTION INTRAMUSCULAR; INTRAVENOUS at 05:23

## 2020-03-12 RX ADMIN — SODIUM CHLORIDE 500 ML: 9 INJECTION, SOLUTION INTRAVENOUS at 05:24

## 2020-03-12 RX ADMIN — GUAIFENESIN 600 MG: 600 TABLET, EXTENDED RELEASE ORAL at 17:52

## 2020-03-12 ASSESSMENT — ENCOUNTER SYMPTOMS
DEPRESSION: 0
DIZZINESS: 0
NAUSEA: 0
FOCAL WEAKNESS: 0
PHOTOPHOBIA: 0
COUGH: 0
EYE PAIN: 0
HEADACHES: 0
SORE THROAT: 0
PALPITATIONS: 0
MEMORY LOSS: 0
FEVER: 0
VOMITING: 0
BACK PAIN: 0
HEMOPTYSIS: 0
SEIZURES: 0
DOUBLE VISION: 0
SPUTUM PRODUCTION: 0
CLAUDICATION: 0
WEIGHT LOSS: 0
BLOOD IN STOOL: 0
CHILLS: 0
SPEECH CHANGE: 0
HEARTBURN: 0
SHORTNESS OF BREATH: 0
SENSORY CHANGE: 0
BLURRED VISION: 0
MYALGIAS: 0
ABDOMINAL PAIN: 0
LOSS OF CONSCIOUSNESS: 0

## 2020-03-12 NOTE — PROGRESS NOTES
Bedside report received from day RN. Patient's plan of care reviewed. Patient found resting in bed, A&Ox4. VSS, on RA. No signs of distress, declines pain at this time. All needs met. Safety precautions in place. Tele box on. Bed in lowest/locked position with 2 rails up. Call light and personal belongings within reach. Will continue to monitor.

## 2020-03-12 NOTE — DISCHARGE PLANNING
[facility] blue team came in to discuss patient. Report he is still needing IV antibiotics at this time. Anticipate once patient is better, can switch to oral and D/C.

## 2020-03-12 NOTE — CARE PLAN
Problem: Discharge Barriers/Planning  Goal: Patient's continuum of care needs will be met  Outcome: PROGRESSING SLOWER THAN EXPECTED     Problem: Psychosocial Needs:  Goal: Level of anxiety will decrease  Outcome: PROGRESSING SLOWER THAN EXPECTED

## 2020-03-12 NOTE — CARE PLAN
Problem: Communication  Goal: The ability to communicate needs accurately and effectively will improve  Outcome: PROGRESSING AS EXPECTED  Note: Patient tearful and anxious regarding not having any visitors. Explained to patient that he is not being singled out and all patients at this time are not receiving visitors for infection control and prevention. Offered to call family/friends so patient to visit over a phone call. Patient declined at this time, appreciative to be educated the reason he is not receiving visitors. Will continue to monitor.      Problem: Bowel/Gastric:  Goal: Normal bowel function is maintained or improved  Outcome: PROGRESSING AS EXPECTED  Note:  3/11/2020. Protocol in place, will continue to monitor.

## 2020-03-12 NOTE — PROGRESS NOTES
Received report from NOC RN. Assumed care of patient at 0700. Patient A&Ox4, speaking in full sentences, follows commands and responds appropriately to questions. No signs of respiratory distress. Respirations are even and unlabored. Patient states 0/10 pain at this time. POC discussed and agreed upon with patient. Call light and belongings within reach. Bed in lowest locked position. Upper side rails raised. Fall risk precautions in place. Hourly rounding. Will continue to monitor.

## 2020-03-13 LAB
ALBUMIN SERPL BCP-MCNC: 3.6 G/DL (ref 3.2–4.9)
ALBUMIN/GLOB SERPL: 0.9 G/DL
ALP SERPL-CCNC: 96 U/L (ref 30–99)
ALT SERPL-CCNC: 40 U/L (ref 2–50)
ANION GAP SERPL CALC-SCNC: 11 MMOL/L (ref 7–16)
AST SERPL-CCNC: 19 U/L (ref 12–45)
BILIRUB SERPL-MCNC: 0.4 MG/DL (ref 0.1–1.5)
BUN SERPL-MCNC: 18 MG/DL (ref 8–22)
CALCIUM SERPL-MCNC: 9.5 MG/DL (ref 8.5–10.5)
CHLORIDE SERPL-SCNC: 100 MMOL/L (ref 96–112)
CO2 SERPL-SCNC: 25 MMOL/L (ref 20–33)
CREAT SERPL-MCNC: 0.99 MG/DL (ref 0.5–1.4)
ERYTHROCYTE [DISTWIDTH] IN BLOOD BY AUTOMATED COUNT: 40.8 FL (ref 35.9–50)
GLOBULIN SER CALC-MCNC: 4 G/DL (ref 1.9–3.5)
GLUCOSE SERPL-MCNC: 135 MG/DL (ref 65–99)
HCT VFR BLD AUTO: 51.4 % (ref 42–52)
HGB BLD-MCNC: 17.5 G/DL (ref 14–18)
MCH RBC QN AUTO: 30.3 PG (ref 27–33)
MCHC RBC AUTO-ENTMCNC: 34 G/DL (ref 33.7–35.3)
MCV RBC AUTO: 89.1 FL (ref 81.4–97.8)
PLATELET # BLD AUTO: 482 K/UL (ref 164–446)
PMV BLD AUTO: 8.5 FL (ref 9–12.9)
POTASSIUM SERPL-SCNC: 4.4 MMOL/L (ref 3.6–5.5)
PROT SERPL-MCNC: 7.6 G/DL (ref 6–8.2)
RBC # BLD AUTO: 5.77 M/UL (ref 4.7–6.1)
SODIUM SERPL-SCNC: 136 MMOL/L (ref 135–145)
WBC # BLD AUTO: 13.1 K/UL (ref 4.8–10.8)

## 2020-03-13 PROCEDURE — 99232 SBSQ HOSP IP/OBS MODERATE 35: CPT | Mod: GC | Performed by: INTERNAL MEDICINE

## 2020-03-13 PROCEDURE — 700111 HCHG RX REV CODE 636 W/ 250 OVERRIDE (IP): Performed by: STUDENT IN AN ORGANIZED HEALTH CARE EDUCATION/TRAINING PROGRAM

## 2020-03-13 PROCEDURE — A9270 NON-COVERED ITEM OR SERVICE: HCPCS | Performed by: STUDENT IN AN ORGANIZED HEALTH CARE EDUCATION/TRAINING PROGRAM

## 2020-03-13 PROCEDURE — 770020 HCHG ROOM/CARE - TELE (206)

## 2020-03-13 PROCEDURE — 700102 HCHG RX REV CODE 250 W/ 637 OVERRIDE(OP): Performed by: STUDENT IN AN ORGANIZED HEALTH CARE EDUCATION/TRAINING PROGRAM

## 2020-03-13 PROCEDURE — 700105 HCHG RX REV CODE 258: Performed by: STUDENT IN AN ORGANIZED HEALTH CARE EDUCATION/TRAINING PROGRAM

## 2020-03-13 PROCEDURE — 85027 COMPLETE CBC AUTOMATED: CPT

## 2020-03-13 PROCEDURE — 80053 COMPREHEN METABOLIC PANEL: CPT

## 2020-03-13 PROCEDURE — 36415 COLL VENOUS BLD VENIPUNCTURE: CPT

## 2020-03-13 RX ADMIN — GUAIFENESIN 600 MG: 600 TABLET, EXTENDED RELEASE ORAL at 17:33

## 2020-03-13 RX ADMIN — GUAIFENESIN 600 MG: 600 TABLET, EXTENDED RELEASE ORAL at 06:12

## 2020-03-13 RX ADMIN — ENOXAPARIN SODIUM 40 MG: 100 INJECTION SUBCUTANEOUS at 06:12

## 2020-03-13 RX ADMIN — CEFTRIAXONE SODIUM 2 G: 2 INJECTION, POWDER, FOR SOLUTION INTRAMUSCULAR; INTRAVENOUS at 06:13

## 2020-03-13 ASSESSMENT — ENCOUNTER SYMPTOMS
SENSORY CHANGE: 0
SEIZURES: 0
BLOOD IN STOOL: 0
EYE PAIN: 0
SORE THROAT: 0
MEMORY LOSS: 0
ABDOMINAL PAIN: 0
MYALGIAS: 0
LOSS OF CONSCIOUSNESS: 0
BACK PAIN: 0
SHORTNESS OF BREATH: 0
SPEECH CHANGE: 0
NAUSEA: 0
DOUBLE VISION: 0
BLURRED VISION: 0
HEADACHES: 0
FEVER: 0
PALPITATIONS: 0
DIZZINESS: 0
VOMITING: 0
WEIGHT LOSS: 0
CHILLS: 0
HEMOPTYSIS: 0
SPUTUM PRODUCTION: 0
CLAUDICATION: 0
DEPRESSION: 0
FOCAL WEAKNESS: 0
PHOTOPHOBIA: 0
COUGH: 0
HEARTBURN: 0

## 2020-03-13 ASSESSMENT — FIBROSIS 4 INDEX: FIB4 SCORE: 0.25

## 2020-03-13 NOTE — CARE PLAN
Problem: Psychosocial Needs:  Goal: Level of anxiety will decrease  Outcome: PROGRESSING AS EXPECTED     Problem: Discharge Barriers/Planning  Goal: Patient's continuum of care needs will be met  Outcome: PROGRESSING SLOWER THAN EXPECTED

## 2020-03-13 NOTE — PROGRESS NOTES
Received Bedside report. Assumed care at 1900. This pt is AOx4, ambulatory with self, voiding bathroom, 0 pain. Patient and RN discussed plan of care: questions answered. Labs noted, assessment complete, patient tolerating reg diet. Tele box in place. Pt is on RA. Call light in place, fall precautions in place, patient educated on importance of calling for assistance. No additional needs at this time. VSS

## 2020-03-13 NOTE — PROGRESS NOTES
Daily Progress Note:     Date of Service: 3/12/2020  Primary Team: [facility] Blue Team   Attending: Dr. Siegel  Senior Resident: Dr. Garza  Intern:   Contact:  [phone]    Chief Complaint:   Dyspnea    Subjective:  A 39-year-old male with PMHx of methamphetamine use has been admitted with chief complaint of dyspnea and was found to have sepsis likely secondary to RLL pneumonia and Streptococcus bacteremia.  He was transferred from ICU to wards on 3/9 in stable condition.  -Patient had no overnight complaints,  -No acute events overnight, patient was afebrile  -Today a.m., patient was responsive and calm; denied any complaints.   -Tachycardic; other vital signs are within normal limits. Saturation is above 90% on room air  -On exam, mildly diminished breath sounds all over lung otherwise unremarkable; no nuchal rigidity or murmur on exam  -WBC is trending down; electrolytes and kidney functions are WNL; non-reactive HIV Ab test  -Blood culture 2/2 from 3/8/2020 are growing Viridans Streptococcus; will continue to follow  -repeat blood cultures 3/10/2020 are NGTD  -Continue ceftriaxone 2 g IV; 14-day course of ceftriaxone (3/8/2020-3/21/2020)      Consultants/Specialty:  Critical care    Review of Systems:  Review of Systems   Constitutional: Negative for chills, fever, malaise/fatigue and weight loss.   HENT: Negative for congestion, ear pain, nosebleeds and sore throat.    Eyes: Negative for blurred vision, double vision, photophobia and pain.   Respiratory: Negative for cough, hemoptysis, sputum production and shortness of breath.    Cardiovascular: Negative for chest pain, palpitations, claudication and leg swelling.   Gastrointestinal: Negative for abdominal pain, blood in stool, heartburn, nausea and vomiting.   Genitourinary: Negative for dysuria and hematuria.   Musculoskeletal: Negative for back pain, joint pain and myalgias.   Skin: Negative for itching and rash.   Neurological: Negative for  dizziness, sensory change, speech change, focal weakness, seizures, loss of consciousness and headaches.   Psychiatric/Behavioral: Negative for depression and memory loss.       Objective Data:   Physical Exam:   Vitals:   Temp:  [36.6 °C (97.9 °F)-37.1 °C (98.8 °F)] 36.8 °C (98.3 °F)  Pulse:  [101-123] 104  Resp:  [14-18] 18  BP: (122-143)/() 142/94  SpO2:  [93 %-100 %] 93 %       Physical Exam  Constitutional:       General: He is not in acute distress.     Appearance: Normal appearance. He is normal weight. He is not ill-appearing, toxic-appearing or diaphoretic.      Comments: Non cooperative and sleeping most of the time   HENT:      Head: Normocephalic and atraumatic.      Nose: Nose normal. No congestion or rhinorrhea.      Mouth/Throat:      Pharynx: Oropharynx is clear. No oropharyngeal exudate or posterior oropharyngeal erythema.   Eyes:      Extraocular Movements: Extraocular movements intact.      Conjunctiva/sclera: Conjunctivae normal.      Pupils: Pupils are equal, round, and reactive to light.   Neck:      Musculoskeletal: Normal range of motion and neck supple. No neck rigidity.   Cardiovascular:      Rate and Rhythm: Normal rate and regular rhythm.      Pulses: Normal pulses.      Heart sounds: Normal heart sounds. No murmur. No friction rub. No gallop.    Pulmonary:      Effort: Pulmonary effort is normal. No respiratory distress.      Breath sounds: Normal breath sounds. No wheezing or rales.      Comments: Mildly diminished breath sounds  Abdominal:      General: Abdomen is flat. There is no distension.      Palpations: Abdomen is soft.      Tenderness: There is no abdominal tenderness. There is no guarding.   Musculoskeletal: Normal range of motion.         General: No swelling, tenderness or deformity.   Lymphadenopathy:      Cervical: No cervical adenopathy.   Skin:     Findings: Lesion (2-3 cm sized, round, healing skin lesion aroung left ankle) present.   Neurological:      General:  "No focal deficit present.      Mental Status: He is alert.   Psychiatric:      Comments: Generally calm and sleeping; becomes irritated on asking questions.          Labs:     Recent Labs     03/10/20  0842 03/11/20  0945 03/12/20  0800   WBC 16.1* 13.5* 17.8*   RBC 5.61 5.63 5.78   HEMOGLOBIN 16.9 16.9 17.4   HEMATOCRIT 50.2 49.5 50.8   MCV 89.5 87.9 87.9   MCH 30.1 30.0 30.1   RDW 41.3 40.7 40.7   PLATELETCT 361 450* 496*   MPV 8.8* 8.7* 8.6*   NEUTSPOLYS 86.10* 77.30* 73.40*   LYMPHOCYTES 7.10* 11.50* 11.80*   MONOCYTES 4.20 7.30 8.40   EOSINOPHILS 1.60 1.40 1.30   BASOPHILS 0.40 0.70 0.90     Recent Labs     03/10/20  0842 03/11/20  0945 03/12/20  0800   SODIUM 135 139 135   POTASSIUM 4.4 4.2 4.7   CHLORIDE 99 104 100   CO2 26 23 22   GLUCOSE 157* 153* 134*   BUN 12 13 15       Imaging:     CT-CHEST (THORAX) W/O   Final Result      1.  Extensive multifocal pneumonia primarily involving RIGHT upper and lower lobes.   2.  Small RIGHT pleural effusion.   3.  No pneumothorax.               DX-CHEST-PORTABLE (1 VIEW)   Final Result      Consolidation within the right lung base.          Assessment and Plan:    * Sepsis (HCC)- (present on admission)  Assessment & Plan  -Resolved   This is Severe Sepsis Present on admission  SIRS criteria identified on my evaluation include: Tachycardia, with heart rate greater than 90 BPM, Tachypnea, with respirations greater than 20 per minute and Leukocyosis, with WBC greater than 12,000  Source of infection is RLL pneumonia  Clinical indicators of end organ dysfunction include Lactate >2 mmol/L (18.0 mg/dL)  IV antibiotics as appropriate for source of sepsis  Reassessment: I have reassessed the patient's hemodynamic status  -Improved with antibiotics, fluids-tachycardia likely has component of methamphetamine intoxication as well.  - See \"RLL pneumonia\" for details.  -No significant overnight issues reported      Bacteremia- (present on admission)  Assessment & Plan  -3/8/2020 Bcx " "growing likely Strep species on preliminary results, likely secondary to Strep pneumonia.  -Patient endorses headache and stiff neck when asked, but exam benign, with no nuchal rigidity and negative Kernig's and Brudzinski's signs, alert and oriented x4, afebrile since admission.    -Patient also adamantly declined lumbar puncture when discussed, especially given likely strep species growing on blood cultures, patient understands risk of missing meningitis and possible death by foregoing lumbar puncture.  Given patient's strong preference, no evidence of meningitis on exam, and likelihood of CSF sterilization by antibiotics (initiated 3/8/2020 morning), will not pursue lumbar puncture or add vancomycin or dexamethasone at this time.  -No evidence of endocarditis, defer echo at this time.  -Blood culture 2/2 from 3/8/2020 are growing Viridans Streptococcus; will continue to follow  -repeat blood cultures 3/10/2020 are NGTD  -Continue ceftriaxone 2 g IV; 14-day course of ceftriaxone (3/8/2020-3/21/2020)    Lactic acidosis- (present on admission)  Assessment & Plan  -Resolved  -In setting of sepsis and acute respiratory failure.      Acute respiratory failure with hypoxia (HCC)- (present on admission)  Assessment & Plan  -Resolved  -In setting of community-acquired pneumonia.  -See \"RLL pneumonia\" for details.    RLL pneumonia (HCC)- (present on admission)  Assessment & Plan  -does not meet criteria for COVID screening  -Flu negative.  -Likely strep pneumonia given characteristic lobar consolidation on imaging, strep species growing on blood cultures.  -Incentive spirometry, supplemental oxygen as needed, RT per protocol.  -On ceftriaxone for concurrent pneumonia and strep bacteremia (3/8/2020-3/17/2020), stopped azithromycin (3/8/2020-3/10/2020).  - Increased Ceftriaxone to 2 g daily for streptococcus viridans     Tachycardia- (present on admission)  Assessment & Plan  -Sinus tachycardia in setting of sepsis syndrome " and methamphetamine intoxication, last use night prior to presentation to ED 3/8/2020  -Monitor.    Methamphetamine abuse (HCC)- (present on admission)  Assessment & Plan  -Patient endorses smoking methamphetamine, last use night prior to presentation to ED 3/8/2020.  Denies past IV drug use or alcohol use.  -monitor for withdrawal symptoms.  -Counseled, to be continued as outpatient.    Leukocytosis- (present on admission)  Assessment & Plan  -Resolving   -In setting of sepsis, likely dehydration.  -Antibiotics, encourage oral intake.  -Monitor.

## 2020-03-13 NOTE — PROGRESS NOTES
Received report from NOC RN. Assumed care of patient at 0700. Patient A&Ox4, pt sleeping comfortably at this time. No signs of respiratory distress. Visible chest wall rising. Respirations are even and unlabored. POC will be discussed with patient. Call light and belongings within reach. Bed in lowest locked position. Upper side rails raised. Fall risk precautions in place. Hourly rounding. Will continue to monitor.

## 2020-03-13 NOTE — CARE PLAN
Problem: Communication  Goal: The ability to communicate needs accurately and effectively will improve  Outcome: PROGRESSING AS EXPECTED  Intervention: Educate patient and significant other/support system about the plan of care, procedures, treatments, medications and allow for questions  Note: Patient educated to utilize call light. Patient and family oriented to hospital room. Patient encouraged to ask questions about plan of care. Patient effectively uses call light and is involved in POC.     Problem: Safety  Goal: Will remain free from injury  Outcome: PROGRESSING AS EXPECTED  Intervention: Provide assistance with mobility  Note: Patient's risk for injury and falls assessed. Appropriate safety precautions in place. Patient educated to utilize call light for needs. Patient verbalizes understanding.

## 2020-03-14 LAB
ALBUMIN SERPL BCP-MCNC: 3.5 G/DL (ref 3.2–4.9)
ALBUMIN/GLOB SERPL: 0.9 G/DL
ALP SERPL-CCNC: 85 U/L (ref 30–99)
ALT SERPL-CCNC: 33 U/L (ref 2–50)
ANION GAP SERPL CALC-SCNC: 10 MMOL/L (ref 7–16)
AST SERPL-CCNC: 19 U/L (ref 12–45)
BASOPHILS # BLD AUTO: 1.7 % (ref 0–1.8)
BASOPHILS # BLD: 0.19 K/UL (ref 0–0.12)
BILIRUB SERPL-MCNC: 0.4 MG/DL (ref 0.1–1.5)
BUN SERPL-MCNC: 15 MG/DL (ref 8–22)
CALCIUM SERPL-MCNC: 9.6 MG/DL (ref 8.5–10.5)
CHLORIDE SERPL-SCNC: 101 MMOL/L (ref 96–112)
CO2 SERPL-SCNC: 25 MMOL/L (ref 20–33)
CREAT SERPL-MCNC: 0.95 MG/DL (ref 0.5–1.4)
EOSINOPHIL # BLD AUTO: 0 K/UL (ref 0–0.51)
EOSINOPHIL NFR BLD: 0 % (ref 0–6.9)
ERYTHROCYTE [DISTWIDTH] IN BLOOD BY AUTOMATED COUNT: 41 FL (ref 35.9–50)
GLOBULIN SER CALC-MCNC: 4 G/DL (ref 1.9–3.5)
GLUCOSE SERPL-MCNC: 95 MG/DL (ref 65–99)
HCT VFR BLD AUTO: 47.9 % (ref 42–52)
HGB BLD-MCNC: 16.2 G/DL (ref 14–18)
LYMPHOCYTES # BLD AUTO: 1.62 K/UL (ref 1–4.8)
LYMPHOCYTES NFR BLD: 14.9 % (ref 22–41)
MANUAL DIFF BLD: NORMAL
MCH RBC QN AUTO: 30.2 PG (ref 27–33)
MCHC RBC AUTO-ENTMCNC: 33.8 G/DL (ref 33.7–35.3)
MCV RBC AUTO: 89.4 FL (ref 81.4–97.8)
MONOCYTES # BLD AUTO: 0.96 K/UL (ref 0–0.85)
MONOCYTES NFR BLD AUTO: 8.8 % (ref 0–13.4)
MORPHOLOGY BLD-IMP: NORMAL
NEUTROPHILS # BLD AUTO: 8.13 K/UL (ref 1.82–7.42)
NEUTROPHILS NFR BLD: 74.6 % (ref 44–72)
NRBC # BLD AUTO: 0 K/UL
NRBC BLD-RTO: 0 /100 WBC
PLATELET # BLD AUTO: 488 K/UL (ref 164–446)
PLATELET BLD QL SMEAR: NORMAL
PMV BLD AUTO: 8.6 FL (ref 9–12.9)
POTASSIUM SERPL-SCNC: 4.2 MMOL/L (ref 3.6–5.5)
PROT SERPL-MCNC: 7.5 G/DL (ref 6–8.2)
RBC # BLD AUTO: 5.36 M/UL (ref 4.7–6.1)
RBC BLD AUTO: NORMAL
SODIUM SERPL-SCNC: 136 MMOL/L (ref 135–145)
WBC # BLD AUTO: 10.9 K/UL (ref 4.8–10.8)

## 2020-03-14 PROCEDURE — 700111 HCHG RX REV CODE 636 W/ 250 OVERRIDE (IP): Performed by: STUDENT IN AN ORGANIZED HEALTH CARE EDUCATION/TRAINING PROGRAM

## 2020-03-14 PROCEDURE — 85007 BL SMEAR W/DIFF WBC COUNT: CPT

## 2020-03-14 PROCEDURE — 700105 HCHG RX REV CODE 258: Performed by: STUDENT IN AN ORGANIZED HEALTH CARE EDUCATION/TRAINING PROGRAM

## 2020-03-14 PROCEDURE — A9270 NON-COVERED ITEM OR SERVICE: HCPCS | Performed by: STUDENT IN AN ORGANIZED HEALTH CARE EDUCATION/TRAINING PROGRAM

## 2020-03-14 PROCEDURE — 80053 COMPREHEN METABOLIC PANEL: CPT

## 2020-03-14 PROCEDURE — 770020 HCHG ROOM/CARE - TELE (206)

## 2020-03-14 PROCEDURE — 700102 HCHG RX REV CODE 250 W/ 637 OVERRIDE(OP): Performed by: STUDENT IN AN ORGANIZED HEALTH CARE EDUCATION/TRAINING PROGRAM

## 2020-03-14 PROCEDURE — 85027 COMPLETE CBC AUTOMATED: CPT

## 2020-03-14 PROCEDURE — 99232 SBSQ HOSP IP/OBS MODERATE 35: CPT | Mod: GC | Performed by: INTERNAL MEDICINE

## 2020-03-14 PROCEDURE — 36415 COLL VENOUS BLD VENIPUNCTURE: CPT

## 2020-03-14 RX ADMIN — ENOXAPARIN SODIUM 40 MG: 100 INJECTION SUBCUTANEOUS at 06:45

## 2020-03-14 RX ADMIN — GUAIFENESIN 600 MG: 600 TABLET, EXTENDED RELEASE ORAL at 17:47

## 2020-03-14 RX ADMIN — CEFTRIAXONE SODIUM 2 G: 2 INJECTION, POWDER, FOR SOLUTION INTRAMUSCULAR; INTRAVENOUS at 06:45

## 2020-03-14 RX ADMIN — GUAIFENESIN 600 MG: 600 TABLET, EXTENDED RELEASE ORAL at 06:45

## 2020-03-14 ASSESSMENT — ENCOUNTER SYMPTOMS
SPUTUM PRODUCTION: 0
CLAUDICATION: 0
FOCAL WEAKNESS: 0
PHOTOPHOBIA: 0
COUGH: 0
BACK PAIN: 0
EYE PAIN: 0
NAUSEA: 0
MYALGIAS: 0
SORE THROAT: 0
BLURRED VISION: 0
DOUBLE VISION: 0
SHORTNESS OF BREATH: 0
DEPRESSION: 0
BLOOD IN STOOL: 0
HEARTBURN: 0
DIZZINESS: 0
WEIGHT LOSS: 0
FEVER: 0
CHILLS: 0
SPEECH CHANGE: 0
MEMORY LOSS: 0
VOMITING: 0
PALPITATIONS: 0
HEMOPTYSIS: 0
ABDOMINAL PAIN: 0
SEIZURES: 0
LOSS OF CONSCIOUSNESS: 0
HEADACHES: 0
SENSORY CHANGE: 0

## 2020-03-14 NOTE — PROGRESS NOTES
Bedside report received from ARNOLD Keyes. Assumed care of pt. Pt sitting up in bed. No signs of distress, no complaints of pain at this time. Tele box on.Call light and belongings within reach. Bed alarm on, bed locked and in lowest position.

## 2020-03-14 NOTE — PROGRESS NOTES
Daily Progress Note:     Date of Service: 3/14/2020  Primary Team: [facility]   Attending: Dr. Siegel  Senior Resident: Dr. Garza  Intern:   Contact:  [phone]    Chief Complaint:   Dyspnea    Subjective:  A 39-year-old male with PMHx of methamphetamine use has been admitted with chief complaint of dyspnea and was found to have sepsis likely secondary to RLL pneumonia and Streptococcus bacteremia.  He was transferred from ICU to wards on 3/9 in stable condition.  -Patient comfortable and responding more actively this a.m. and not somnolent, and responding well to questions and denying any complaints.  Patient afebrile with no acute events reported  -No acute events overnight, patient was afebrile  -Today a.m., patient was responsive and calm; denied any complaints.   -Tachycardic; other vital signs are within normal limits. Saturation is above 90% on room air  -On exam, mildly diminished breath sounds all over lung otherwise unremarkable; no nuchal rigidity or murmur on exam  -WBC is trending down; electrolytes and kidney functions are WNL; non-reactive HIV Ab test  -Blood culture 2/2 from 3/8/2020 are growing Viridans Streptococcus; will continue to follow  -repeat blood cultures 3/10/2020 are NGTD  -Flu negative  -IV ceftriaxone for 14 days to continue  -IV antibiotics for 14 days starting from 3/8/2020 to  3/17/2020  -We will monitor leukocytosis closely.  WBC count on 3/14/2020 is 13.1    Consultants/Specialty:  Critical care    Review of Systems:  Review of Systems   Constitutional: Negative for chills, fever, malaise/fatigue and weight loss.   HENT: Negative for congestion, ear pain, nosebleeds and sore throat.    Eyes: Negative for blurred vision, double vision, photophobia and pain.   Respiratory: Negative for cough, hemoptysis, sputum production and shortness of breath.    Cardiovascular: Negative for chest pain, palpitations, claudication and leg swelling.   Gastrointestinal: Negative  for abdominal pain, blood in stool, heartburn, nausea and vomiting.   Genitourinary: Negative for dysuria and hematuria.   Musculoskeletal: Negative for back pain, joint pain and myalgias.   Skin: Negative for itching and rash.   Neurological: Negative for dizziness, sensory change, speech change, focal weakness, seizures, loss of consciousness and headaches.   Psychiatric/Behavioral: Negative for depression and memory loss.       Objective Data:   Physical Exam:   Vitals:   Temp:  [36.5 °C (97.7 °F)-37.4 °C (99.3 °F)] 36.6 °C (97.8 °F)  Pulse:  [94-99] 97  Resp:  [17-18] 17  BP: (119-136)/(75-99) 120/90  SpO2:  [93 %-99 %] 98 %       Physical Exam  Constitutional:       General: He is not in acute distress.     Appearance: Normal appearance. He is normal weight. He is not ill-appearing, toxic-appearing or diaphoretic.      Comments: Non cooperative and sleeping most of the time   HENT:      Head: Normocephalic and atraumatic.      Nose: Nose normal. No congestion or rhinorrhea.      Mouth/Throat:      Pharynx: Oropharynx is clear. No oropharyngeal exudate or posterior oropharyngeal erythema.   Eyes:      Extraocular Movements: Extraocular movements intact.      Conjunctiva/sclera: Conjunctivae normal.      Pupils: Pupils are equal, round, and reactive to light.   Neck:      Musculoskeletal: Normal range of motion and neck supple. No neck rigidity.   Cardiovascular:      Rate and Rhythm: Normal rate and regular rhythm.      Pulses: Normal pulses.      Heart sounds: Normal heart sounds. No murmur. No friction rub. No gallop.    Pulmonary:      Effort: Pulmonary effort is normal. No respiratory distress.      Breath sounds: Normal breath sounds. No wheezing or rales.      Comments: Mildly diminished breath sounds  Abdominal:      General: Abdomen is flat. There is no distension.      Palpations: Abdomen is soft.      Tenderness: There is no abdominal tenderness. There is no guarding.   Musculoskeletal: Normal range of  motion.         General: No swelling, tenderness or deformity.   Lymphadenopathy:      Cervical: No cervical adenopathy.   Skin:     Findings: Lesion (2-3 cm sized, round, healing skin lesion aroung left ankle) present.   Neurological:      General: No focal deficit present.      Mental Status: He is alert.   Psychiatric:      Comments: Generally calm and sleeping; becomes irritated on asking questions.          Labs:     Recent Labs     03/12/20  0800 03/13/20  0958 03/14/20  0849   WBC 17.8* 13.1* 10.9*   RBC 5.78 5.77 5.36   HEMOGLOBIN 17.4 17.5 16.2   HEMATOCRIT 50.8 51.4 47.9   MCV 87.9 89.1 89.4   MCH 30.1 30.3 30.2   RDW 40.7 40.8 41.0   PLATELETCT 496* 482* 488*   MPV 8.6* 8.5* 8.6*   NEUTSPOLYS 73.40*  --  74.60*   LYMPHOCYTES 11.80*  --  14.90*   MONOCYTES 8.40  --  8.80   EOSINOPHILS 1.30  --  0.00   BASOPHILS 0.90  --  1.70   RBCMORPHOLO  --   --  Normal     Recent Labs     03/12/20  0800 03/13/20  0958 03/14/20  0849   SODIUM 135 136 136   POTASSIUM 4.7 4.4 4.2   CHLORIDE 100 100 101   CO2 22 25 25   GLUCOSE 134* 135* 95   BUN 15 18 15       Imaging:     CT-CHEST (THORAX) W/O   Final Result      1.  Extensive multifocal pneumonia primarily involving RIGHT upper and lower lobes.   2.  Small RIGHT pleural effusion.   3.  No pneumothorax.               DX-CHEST-PORTABLE (1 VIEW)   Final Result      Consolidation within the right lung base.          Assessment and Plan:    * Sepsis (HCC)- (present on admission)  Assessment & Plan  -Resolved   This is Severe Sepsis Present on admission  SIRS criteria identified on my evaluation include: Tachycardia, with heart rate greater than 90 BPM, Tachypnea, with respirations greater than 20 per minute and Leukocyosis, with WBC greater than 12,000  Source of infection is RLL pneumonia  Clinical indicators of end organ dysfunction include Lactate >2 mmol/L (18.0 mg/dL)  IV antibiotics as appropriate for source of sepsis  Reassessment: I have reassessed the patient's  "hemodynamic status  -Improved with antibiotics, fluids-tachycardia likely has component of methamphetamine intoxication as well.  - See \"RLL pneumonia\" for details.  -No significant overnight issues reported      Bacteremia- (present on admission)  Assessment & Plan  -3/8/2020 Bcx growing likely Strep species on preliminary results, likely secondary to Strep pneumonia.  -Patient endorses headache and stiff neck when asked, but exam benign, with no nuchal rigidity and negative Kernig's and Brudzinski's signs, alert and oriented x4, afebrile since admission.    -Patient also adamantly declined lumbar puncture when discussed, especially given likely strep species growing on blood cultures, patient understands risk of missing meningitis and possible death by foregoing lumbar puncture.  Given patient's strong preference, no evidence of meningitis on exam, and likelihood of CSF sterilization by antibiotics (initiated 3/8/2020 morning), will not pursue lumbar puncture or add vancomycin or dexamethasone at this time.  -No evidence of endocarditis, defer echo at this time.  -Blood culture 2/2 from 3/8/2020 are growing Viridans Streptococcus; will continue to follow  -repeat blood cultures 3/10/2020 are NGTD  -Continue ceftriaxone 2 g IV; 14-day course of ceftriaxone (3/8/2020-3/21/2020)    Lactic acidosis- (present on admission)  Assessment & Plan  -Resolved  -In setting of sepsis and acute respiratory failure.      Acute respiratory failure with hypoxia (HCC)- (present on admission)  Assessment & Plan  -Resolved  -In setting of community-acquired pneumonia.  -See \"RLL pneumonia\" for details.    RLL pneumonia (HCC)- (present on admission)  Assessment & Plan  -does not meet criteria for COVID screening  -Flu negative.  -Likely strep pneumonia given characteristic lobar consolidation on imaging, strep species growing on blood cultures.  -Incentive spirometry, supplemental oxygen as needed, RT per protocol.  -On ceftriaxone for " concurrent pneumonia and strep bacteremia (3/8/2020-3/17/2020), stopped azithromycin (3/8/2020-3/10/2020).  - Increased Ceftriaxone to 2 g daily for streptococcus viridans   IV-Ceftriaxone to continue for 14 days still 3/17/2020 from 3/8/2020    Tachycardia- (present on admission)  Assessment & Plan  -Sinus tachycardia in setting of sepsis syndrome and methamphetamine intoxication, last use night prior to presentation to ED 3/8/2020  -Monitor.    Methamphetamine abuse (HCC)- (present on admission)  Assessment & Plan  -Patient endorses smoking methamphetamine, last use night prior to presentation to ED 3/8/2020.  Denies past IV drug use or alcohol use.  -monitor for withdrawal symptoms.  -Counseled, to be continued as outpatient.    Leukocytosis- (present on admission)  Assessment & Plan  -Resolving   -In setting of sepsis, likely dehydration.  -Antibiotics, encourage oral intake.  -Monitor.      A computerized dictation system may have been used for this note.    Despite review, there may be some spelling or grammatical errors.  Joyce Garza M.D.

## 2020-03-15 PROBLEM — D72.829 LEUKOCYTOSIS: Status: RESOLVED | Noted: 2020-03-08 | Resolved: 2020-03-15

## 2020-03-15 PROBLEM — E87.20 LACTIC ACIDOSIS: Status: RESOLVED | Noted: 2020-03-08 | Resolved: 2020-03-15

## 2020-03-15 LAB
ALBUMIN SERPL BCP-MCNC: 3.7 G/DL (ref 3.2–4.9)
ALBUMIN/GLOB SERPL: 1.2 G/DL
ALP SERPL-CCNC: 79 U/L (ref 30–99)
ALT SERPL-CCNC: 29 U/L (ref 2–50)
ANION GAP SERPL CALC-SCNC: 11 MMOL/L (ref 7–16)
AST SERPL-CCNC: 18 U/L (ref 12–45)
BACTERIA BLD CULT: NORMAL
BACTERIA BLD CULT: NORMAL
BASOPHILS # BLD AUTO: 0.9 % (ref 0–1.8)
BASOPHILS # BLD: 0.09 K/UL (ref 0–0.12)
BILIRUB SERPL-MCNC: 0.4 MG/DL (ref 0.1–1.5)
BUN SERPL-MCNC: 13 MG/DL (ref 8–22)
BURR CELLS BLD QL SMEAR: NORMAL
CALCIUM SERPL-MCNC: 9.5 MG/DL (ref 8.5–10.5)
CHLORIDE SERPL-SCNC: 102 MMOL/L (ref 96–112)
CO2 SERPL-SCNC: 25 MMOL/L (ref 20–33)
CREAT SERPL-MCNC: 0.83 MG/DL (ref 0.5–1.4)
EOSINOPHIL # BLD AUTO: 0 K/UL (ref 0–0.51)
EOSINOPHIL NFR BLD: 0 % (ref 0–6.9)
ERYTHROCYTE [DISTWIDTH] IN BLOOD BY AUTOMATED COUNT: 41.2 FL (ref 35.9–50)
GLOBULIN SER CALC-MCNC: 3.1 G/DL (ref 1.9–3.5)
GLUCOSE SERPL-MCNC: 113 MG/DL (ref 65–99)
HCT VFR BLD AUTO: 48.1 % (ref 42–52)
HGB BLD-MCNC: 16.2 G/DL (ref 14–18)
LYMPHOCYTES # BLD AUTO: 2.08 K/UL (ref 1–4.8)
LYMPHOCYTES NFR BLD: 21.7 % (ref 22–41)
MANUAL DIFF BLD: NORMAL
MCH RBC QN AUTO: 30.2 PG (ref 27–33)
MCHC RBC AUTO-ENTMCNC: 33.7 G/DL (ref 33.7–35.3)
MCV RBC AUTO: 89.6 FL (ref 81.4–97.8)
MONOCYTES # BLD AUTO: 0.59 K/UL (ref 0–0.85)
MONOCYTES NFR BLD AUTO: 6.1 % (ref 0–13.4)
MORPHOLOGY BLD-IMP: NORMAL
MYELOCYTES NFR BLD MANUAL: 2.6 %
NEUTROPHILS # BLD AUTO: 6.6 K/UL (ref 1.82–7.42)
NEUTROPHILS NFR BLD: 67 % (ref 44–72)
NEUTS BAND NFR BLD MANUAL: 1.7 % (ref 0–10)
NRBC # BLD AUTO: 0 K/UL
NRBC BLD-RTO: 0 /100 WBC
PLATELET # BLD AUTO: 503 K/UL (ref 164–446)
PLATELET BLD QL SMEAR: NORMAL
PMV BLD AUTO: 8.6 FL (ref 9–12.9)
POIKILOCYTOSIS BLD QL SMEAR: NORMAL
POTASSIUM SERPL-SCNC: 4.2 MMOL/L (ref 3.6–5.5)
PROT SERPL-MCNC: 6.8 G/DL (ref 6–8.2)
RBC # BLD AUTO: 5.37 M/UL (ref 4.7–6.1)
RBC BLD AUTO: PRESENT
SIGNIFICANT IND 70042: NORMAL
SIGNIFICANT IND 70042: NORMAL
SITE SITE: NORMAL
SITE SITE: NORMAL
SODIUM SERPL-SCNC: 138 MMOL/L (ref 135–145)
SOURCE SOURCE: NORMAL
SOURCE SOURCE: NORMAL
WBC # BLD AUTO: 9.6 K/UL (ref 4.8–10.8)

## 2020-03-15 PROCEDURE — 99232 SBSQ HOSP IP/OBS MODERATE 35: CPT | Mod: GC | Performed by: INTERNAL MEDICINE

## 2020-03-15 PROCEDURE — 85007 BL SMEAR W/DIFF WBC COUNT: CPT

## 2020-03-15 PROCEDURE — 770001 HCHG ROOM/CARE - MED/SURG/GYN PRIV*

## 2020-03-15 PROCEDURE — 770020 HCHG ROOM/CARE - TELE (206)

## 2020-03-15 PROCEDURE — 700111 HCHG RX REV CODE 636 W/ 250 OVERRIDE (IP): Performed by: STUDENT IN AN ORGANIZED HEALTH CARE EDUCATION/TRAINING PROGRAM

## 2020-03-15 PROCEDURE — 700102 HCHG RX REV CODE 250 W/ 637 OVERRIDE(OP): Performed by: STUDENT IN AN ORGANIZED HEALTH CARE EDUCATION/TRAINING PROGRAM

## 2020-03-15 PROCEDURE — 85027 COMPLETE CBC AUTOMATED: CPT

## 2020-03-15 PROCEDURE — 80053 COMPREHEN METABOLIC PANEL: CPT

## 2020-03-15 PROCEDURE — 700105 HCHG RX REV CODE 258: Performed by: STUDENT IN AN ORGANIZED HEALTH CARE EDUCATION/TRAINING PROGRAM

## 2020-03-15 PROCEDURE — 36415 COLL VENOUS BLD VENIPUNCTURE: CPT

## 2020-03-15 PROCEDURE — A9270 NON-COVERED ITEM OR SERVICE: HCPCS | Performed by: STUDENT IN AN ORGANIZED HEALTH CARE EDUCATION/TRAINING PROGRAM

## 2020-03-15 RX ADMIN — CEFTRIAXONE SODIUM 2 G: 2 INJECTION, POWDER, FOR SOLUTION INTRAMUSCULAR; INTRAVENOUS at 06:17

## 2020-03-15 RX ADMIN — GUAIFENESIN 600 MG: 600 TABLET, EXTENDED RELEASE ORAL at 06:17

## 2020-03-15 RX ADMIN — ENOXAPARIN SODIUM 40 MG: 100 INJECTION SUBCUTANEOUS at 06:17

## 2020-03-15 RX ADMIN — GUAIFENESIN 600 MG: 600 TABLET, EXTENDED RELEASE ORAL at 17:42

## 2020-03-15 ASSESSMENT — ENCOUNTER SYMPTOMS
EYE REDNESS: 0
DIARRHEA: 0
SORE THROAT: 0
PALPITATIONS: 0
NERVOUS/ANXIOUS: 0
WEAKNESS: 0
SENSORY CHANGE: 0
CHILLS: 0
HEADACHES: 0
WEIGHT LOSS: 0
MEMORY LOSS: 0
MYALGIAS: 0
FEVER: 0
VOMITING: 0
ABDOMINAL PAIN: 0
CONSTIPATION: 0
TREMORS: 0
COUGH: 0
SHORTNESS OF BREATH: 0
EYE PAIN: 0
BRUISES/BLEEDS EASILY: 0
NAUSEA: 0
POLYDIPSIA: 0
SPUTUM PRODUCTION: 0

## 2020-03-15 ASSESSMENT — PATIENT HEALTH QUESTIONNAIRE - PHQ9
2. FEELING DOWN, DEPRESSED, IRRITABLE, OR HOPELESS: NOT AT ALL
1. LITTLE INTEREST OR PLEASURE IN DOING THINGS: NOT AT ALL
SUM OF ALL RESPONSES TO PHQ9 QUESTIONS 1 AND 2: 0

## 2020-03-15 ASSESSMENT — LIFESTYLE VARIABLES: SUBSTANCE_ABUSE: 0

## 2020-03-15 ASSESSMENT — FIBROSIS 4 INDEX: FIB4 SCORE: 0.27

## 2020-03-15 NOTE — CARE PLAN
"  Problem: Safety  Goal: Will remain free from injury  Outcome: PROGRESSING AS EXPECTED  Note: Pt calls appropriately for assistance when needed.        Problem: Bowel/Gastric:  Goal: Normal bowel function is maintained or improved  Outcome: PROGRESSING AS EXPECTED   Note: Pt states \"having normal bowel movements\" during hospital stay.   "

## 2020-03-15 NOTE — PROGRESS NOTES
Daily Progress Note:     Date of Service: 3/15/2020  Primary Team: [facility]   Attending: Dr. Robbin MD  Senior Resident: Dr. Garza  Intern: Dr. Da Silva  Contact:  [phone]    ID: Patient is a 39-year-old male with a PMH of methamphetamine use who was admitted for sepsis secondary to right lower lobe pneumonia and Streptococcus viridans bacteremia    Chief Complaint: Dyspnea    Subjective:  -No acute events overnight  -Patient reports that he is doing better, denies any new or acute symptoms. Denies any shortness of breath, saturating well on room air.    Interval history:  3/8/2020: BC- Streptococcus viridans  3/10: BC-NGTD  3/15: Day 8 of IV ceftriaxone 2gm, continue until 3/21/2020 (total of 2 weeks for viridans bacteremia)    Consultants/Specialty:  ICU-critical care    Review of Systems:    Review of Systems   Constitutional: Negative for chills, fever and weight loss.   HENT: Negative for congestion and sore throat.    Eyes: Negative for pain and redness.   Respiratory: Negative for cough, sputum production and shortness of breath.    Cardiovascular: Negative for chest pain, palpitations and leg swelling.   Gastrointestinal: Negative for abdominal pain, constipation, diarrhea, nausea and vomiting.   Genitourinary: Negative for dysuria and frequency.   Musculoskeletal: Negative for joint pain and myalgias.   Skin: Negative for itching and rash.   Neurological: Negative for tremors, sensory change, weakness and headaches.   Endo/Heme/Allergies: Negative for polydipsia. Does not bruise/bleed easily.   Psychiatric/Behavioral: Negative for memory loss and substance abuse. The patient is not nervous/anxious.      Objective Data:   Physical Exam:   Vitals:   Temp:  [36.4 °C (97.5 °F)-37.3 °C (99.2 °F)] 36.4 °C (97.5 °F)  Pulse:  [83-99] 99  Resp:  [17-18] 18  BP: (131-156)/(74-88) 131/80  SpO2:  [96 %-98 %] 96 %     Physical Exam  Constitutional:       Appearance: Normal appearance.   HENT:       "Head: Normocephalic and atraumatic.      Nose: Nose normal.   Eyes:      Extraocular Movements: Extraocular movements intact.      Pupils: Pupils are equal, round, and reactive to light.   Neck:      Musculoskeletal: Normal range of motion and neck supple. No muscular tenderness.   Cardiovascular:      Rate and Rhythm: Normal rate and regular rhythm.      Heart sounds: No murmur.   Pulmonary:      Effort: Pulmonary effort is normal. No respiratory distress.      Breath sounds: Normal breath sounds.   Abdominal:      General: Bowel sounds are normal.      Palpations: There is no mass.      Tenderness: There is no abdominal tenderness.   Musculoskeletal: Normal range of motion.         General: No swelling.   Skin:     General: Skin is warm.      Findings: No rash.   Neurological:      General: No focal deficit present.      Mental Status: He is alert and oriented to person, place, and time.      Sensory: No sensory deficit.      Motor: No weakness.     Labs:   CBC: Unremarkable, WBC normal 9.6 from 1.9  CMP: Unremarkable, except for glucose 113    Imaging:   No new imaging    Assessment & Plan:  * Sepsis (HCC)- (present on admission)  Assessment & Plan  -Resolved   -Continue IV ceftriaxone 2 g for Streptococcus viridans bacteremia until 3/21/2020    Bacteremia- (present on admission)  Assessment & Plan  -3/8/2020: BC- Streptococcus viridans  -3/10: BC-NGTD  -No evidence of endocarditis  -Continue ceftriaxone 2 g IV for total of 14-days (3/8/2020-3/21/2020)    Acute respiratory failure with hypoxia (HCC)- (present on admission)  Assessment & Plan  -Resolved  -In setting of community-acquired pneumonia.  -See \"RLL pneumonia\" for details.    RLL pneumonia (HCC)- (present on admission)  Assessment & Plan  -does not meet criteria for COVID screening  -Flu negative.  -Likely strep pneumonia given characteristic lobar consolidation on imaging, strep species growing on blood cultures.  -Incentive spirometry, supplemental " oxygen as needed, RT per protocol.  -Continue IV ceftriaxone until 3/21     Tachycardia- (present on admission)  Assessment & Plan  -Sinus tachycardia in setting of sepsis syndrome and methamphetamine intoxication  -Will Monitor    Methamphetamine abuse (HCC)- (present on admission)  Assessment & Plan  -H/o methamphetamine use, last use night prior to presentation to ED 3/8/2020  -Denies past IV drug use or alcohol use.  -Counseled to quit, to be continued as outpatient.    Disposition: Patient requires IV antibiotics at this time

## 2020-03-15 NOTE — PROGRESS NOTES
Report received at bedside from NOC RN, pt care assumed, no tele box in place, pt is medical. Pt aaox4, no signs of distress noted at this time. Patient resting comfortably in bed. POC discussed with pt and verbalizes no questions. Pt denies any additional needs at this time. Bed in lowest position, call light within reach.

## 2020-03-16 ENCOUNTER — APPOINTMENT (OUTPATIENT)
Dept: CARDIOLOGY | Facility: MEDICAL CENTER | Age: 40
DRG: 871 | End: 2020-03-16
Attending: STUDENT IN AN ORGANIZED HEALTH CARE EDUCATION/TRAINING PROGRAM
Payer: MEDICAID

## 2020-03-16 ENCOUNTER — APPOINTMENT (OUTPATIENT)
Dept: RADIOLOGY | Facility: MEDICAL CENTER | Age: 40
DRG: 871 | End: 2020-03-16
Attending: STUDENT IN AN ORGANIZED HEALTH CARE EDUCATION/TRAINING PROGRAM
Payer: MEDICAID

## 2020-03-16 LAB
BASOPHILS # BLD AUTO: 0.8 % (ref 0–1.8)
BASOPHILS # BLD: 0.1 K/UL (ref 0–0.12)
EOSINOPHIL # BLD AUTO: 0.09 K/UL (ref 0–0.51)
EOSINOPHIL NFR BLD: 0.8 % (ref 0–6.9)
ERYTHROCYTE [DISTWIDTH] IN BLOOD BY AUTOMATED COUNT: 40.2 FL (ref 35.9–50)
HCT VFR BLD AUTO: 47.1 % (ref 42–52)
HGB BLD-MCNC: 15.9 G/DL (ref 14–18)
IMM GRANULOCYTES # BLD AUTO: 0.45 K/UL (ref 0–0.11)
IMM GRANULOCYTES NFR BLD AUTO: 3.8 % (ref 0–0.9)
LV EJECT FRACT  99904: 55
LV EJECT FRACT MOD 2C 99903: 52.23
LV EJECT FRACT MOD 4C 99902: 57.69
LV EJECT FRACT MOD BP 99901: 54.3
LYMPHOCYTES # BLD AUTO: 2.43 K/UL (ref 1–4.8)
LYMPHOCYTES NFR BLD: 20.6 % (ref 22–41)
MCH RBC QN AUTO: 29.9 PG (ref 27–33)
MCHC RBC AUTO-ENTMCNC: 33.8 G/DL (ref 33.7–35.3)
MCV RBC AUTO: 88.7 FL (ref 81.4–97.8)
MONOCYTES # BLD AUTO: 0.78 K/UL (ref 0–0.85)
MONOCYTES NFR BLD AUTO: 6.6 % (ref 0–13.4)
NEUTROPHILS # BLD AUTO: 7.94 K/UL (ref 1.82–7.42)
NEUTROPHILS NFR BLD: 67.4 % (ref 44–72)
NRBC # BLD AUTO: 0 K/UL
NRBC BLD-RTO: 0 /100 WBC
PLATELET # BLD AUTO: 583 K/UL (ref 164–446)
PMV BLD AUTO: 8.4 FL (ref 9–12.9)
RBC # BLD AUTO: 5.31 M/UL (ref 4.7–6.1)
WBC # BLD AUTO: 11.8 K/UL (ref 4.8–10.8)

## 2020-03-16 PROCEDURE — 700105 HCHG RX REV CODE 258: Performed by: STUDENT IN AN ORGANIZED HEALTH CARE EDUCATION/TRAINING PROGRAM

## 2020-03-16 PROCEDURE — A9270 NON-COVERED ITEM OR SERVICE: HCPCS | Performed by: STUDENT IN AN ORGANIZED HEALTH CARE EDUCATION/TRAINING PROGRAM

## 2020-03-16 PROCEDURE — 700102 HCHG RX REV CODE 250 W/ 637 OVERRIDE(OP): Performed by: STUDENT IN AN ORGANIZED HEALTH CARE EDUCATION/TRAINING PROGRAM

## 2020-03-16 PROCEDURE — 700105 HCHG RX REV CODE 258

## 2020-03-16 PROCEDURE — 700111 HCHG RX REV CODE 636 W/ 250 OVERRIDE (IP): Performed by: STUDENT IN AN ORGANIZED HEALTH CARE EDUCATION/TRAINING PROGRAM

## 2020-03-16 PROCEDURE — 93306 TTE W/DOPPLER COMPLETE: CPT

## 2020-03-16 PROCEDURE — 770001 HCHG ROOM/CARE - MED/SURG/GYN PRIV*

## 2020-03-16 PROCEDURE — 36415 COLL VENOUS BLD VENIPUNCTURE: CPT

## 2020-03-16 PROCEDURE — 71046 X-RAY EXAM CHEST 2 VIEWS: CPT

## 2020-03-16 PROCEDURE — 99232 SBSQ HOSP IP/OBS MODERATE 35: CPT | Performed by: INTERNAL MEDICINE

## 2020-03-16 PROCEDURE — 85025 COMPLETE CBC W/AUTO DIFF WBC: CPT

## 2020-03-16 PROCEDURE — 93306 TTE W/DOPPLER COMPLETE: CPT | Mod: 26 | Performed by: INTERNAL MEDICINE

## 2020-03-16 RX ORDER — SODIUM CHLORIDE 9 MG/ML
INJECTION, SOLUTION INTRAVENOUS
Status: COMPLETED
Start: 2020-03-16 | End: 2020-03-16

## 2020-03-16 RX ADMIN — CEFTRIAXONE SODIUM 2 G: 2 INJECTION, POWDER, FOR SOLUTION INTRAMUSCULAR; INTRAVENOUS at 05:55

## 2020-03-16 RX ADMIN — SODIUM CHLORIDE: 9 INJECTION, SOLUTION INTRAVENOUS at 06:00

## 2020-03-16 RX ADMIN — GUAIFENESIN 600 MG: 600 TABLET, EXTENDED RELEASE ORAL at 05:53

## 2020-03-16 RX ADMIN — ENOXAPARIN SODIUM 40 MG: 100 INJECTION SUBCUTANEOUS at 05:54

## 2020-03-16 RX ADMIN — SENNOSIDES AND DOCUSATE SODIUM 2 TABLET: 8.6; 5 TABLET ORAL at 17:09

## 2020-03-16 RX ADMIN — GUAIFENESIN 600 MG: 600 TABLET, EXTENDED RELEASE ORAL at 17:09

## 2020-03-16 ASSESSMENT — ENCOUNTER SYMPTOMS
DIARRHEA: 0
BLOOD IN STOOL: 0
FOCAL WEAKNESS: 0
ABDOMINAL PAIN: 0
CHILLS: 0
HEARTBURN: 0
FEVER: 0
VOMITING: 0
SHORTNESS OF BREATH: 0
NAUSEA: 0
SORE THROAT: 0
DOUBLE VISION: 0
PALPITATIONS: 0
MYALGIAS: 0
HEADACHES: 0
ORTHOPNEA: 0
COUGH: 0
SENSORY CHANGE: 0
BLURRED VISION: 0

## 2020-03-16 ASSESSMENT — FIBROSIS 4 INDEX: FIB4 SCORE: 0.23

## 2020-03-16 NOTE — CARE PLAN
Problem: Communication  Goal: The ability to communicate needs accurately and effectively will improve  Outcome: PROGRESSING AS EXPECTED   Patient educated to utilize call light. Patient and family oriented to hospital room. Patient encouraged to ask questions about plan of care. Patient effectively uses call light and is involved in POC.       Problem: Knowledge Deficit  Goal: Knowledge of disease process/condition, treatment plan, diagnostic tests, and medications will improve  Outcome: PROGRESSING AS EXPECTED   Patient is educated of disease process and condition. Treatment team has included patient in plan of care. All medications indications and side effects are explained. Patient is encouraged to ask questions. Patient indicates understanding.

## 2020-03-16 NOTE — PROGRESS NOTES
Daily Progress Note:     Date of Service: 3/16/2020  Primary Team: [facility] Blue Team   Attending: Lyubov Siegel M.D.  Senior Resident: Dr. Garza  Intern: Dr. Jackson  Contact:  [phone]    - Overnight events: No overnight events  - Subjective: He is feeling much better but very frustrated and verbally agressive. I have discussed that he is the final decision maker about anything we provide to him. He is breathing well on room air, no shortness of breath.  - Vitals: Saturating 94% on room air. Temperature 97.5, BP is stable. RR is 17.  - Pertinent physical: Sub-optimal examination, patient was not cooperative with directions. Right lower lung mild crackles.  - Labs/Cultures: WBC normalized 9.6, No electrolyte abnormalities or anemia.  - Imaging: Echocardiography showing no LV dysfunction, endocarditis. EF 55%.  - Consults: No consults   - Interventions: No interventions  - Med changes: No changes for now. Will continue rocephin IV until 3/21/2020.  - Daytime events No events.  - Discharge plan: To home.       =====================================    Review of Systems:    Review of Systems   Constitutional: Negative for chills and fever.   HENT: Negative for sore throat.    Eyes: Negative for blurred vision and double vision.   Respiratory: Negative for cough and shortness of breath.    Cardiovascular: Negative for chest pain, palpitations, orthopnea and leg swelling.   Gastrointestinal: Negative for abdominal pain, blood in stool, diarrhea, heartburn, melena, nausea and vomiting.   Genitourinary: Negative for dysuria.   Musculoskeletal: Negative for myalgias.   Neurological: Negative for sensory change, focal weakness and headaches.       Objective Data:   Physical Exam:   Vitals:   Temp:  [36.2 °C (97.1 °F)-37.5 °C (99.5 °F)] 36.2 °C (97.1 °F)  Pulse:  [] 100  Resp:  [16-18] 18  BP: (123-152)/(84-97) 125/97  SpO2:  [92 %-95 %] 95 %     Physical Exam  Vitals signs reviewed.   Constitutional:        General: He is not in acute distress.     Appearance: He is not ill-appearing.   HENT:      Head: Normocephalic.      Nose: Nose normal. No congestion or rhinorrhea.      Mouth/Throat:      Mouth: Mucous membranes are moist.   Eyes:      Extraocular Movements: Extraocular movements intact.   Neck:      Musculoskeletal: Neck supple.   Cardiovascular:      Rate and Rhythm: Normal rate and regular rhythm.      Pulses: Normal pulses.      Heart sounds: Normal heart sounds.   Pulmonary:      Effort: Pulmonary effort is normal. No respiratory distress.      Breath sounds: Rales present. No wheezing or rhonchi.   Abdominal:      Palpations: Abdomen is soft.      Tenderness: There is no abdominal tenderness. There is no rebound.   Musculoskeletal:      Right lower leg: No edema.      Left lower leg: No edema.   Skin:     Findings: No rash.   Neurological:      General: No focal deficit present.      Mental Status: He is oriented to person, place, and time.      Cranial Nerves: No cranial nerve deficit.      Motor: No weakness.   Psychiatric:         Mood and Affect: Mood normal.         * RLL pneumonia (HCC)- (present on admission)  Assessment & Plan  -does not meet criteria for COVID screening  -Flu negative.  -Likely strep pneumonia given characteristic lobar consolidation on imaging, strep species growing on blood cultures.  -Incentive spirometry, supplemental oxygen as needed, RT per protocol.  -Continue IV ceftriaxone until 3/21     Methamphetamine abuse (HCC)- (present on admission)  Assessment & Plan  -H/o methamphetamine use, last use night prior to presentation to ED 3/8/2020  -Denies past IV drug use or alcohol use.  -Counseled to quit, to be continued as outpatient.  - Echo 3/16/2020 normal LV function EF 55%. No signs of endocarditis.    Bacteremia- (present on admission)  Assessment & Plan  - 3/8/2020: BC- Streptococcus viridans  - 3/10: BC-NGTD  - No evidence of endocarditis  - Continue ceftriaxone 2 g IV  "for total of 14-days (3/8/2020-3/21/2020)  - Echo 3/16/2020 normal LV function. No signs of endocarditis.    Tachycardia- (present on admission)  Assessment & Plan  -Sinus tachycardia in setting of sepsis syndrome and methamphetamine intoxication  -Will Monitor    Acute respiratory failure with hypoxia (HCC)- (present on admission)  Assessment & Plan  -Resolved  -In setting of community-acquired pneumonia.  -See \"RLL pneumonia\" for details.    Sepsis (HCC)- (present on admission)  Assessment & Plan  -Resolved   -Continue IV ceftriaxone 2 g for Streptococcus viridans bacteremia until 3/21/2020      "

## 2020-03-16 NOTE — PROGRESS NOTES
Received Bedside report. Assumed care at 0700. This pt is AOx4, ambulatory and upself, voiding appropriately, denies pain. Patient and RN discussed plan of care: questions answered. Labs noted, assessment complete, patient tolerating regular diet. Tele box in place. Pt is on RA. Call light in place, fall precautions in place, patient educated on importance of calling for assistance. No additional needs at this time. VSS

## 2020-03-17 ENCOUNTER — PATIENT OUTREACH (OUTPATIENT)
Dept: HEALTH INFORMATION MANAGEMENT | Facility: OTHER | Age: 40
End: 2020-03-17

## 2020-03-17 VITALS
HEIGHT: 71 IN | SYSTOLIC BLOOD PRESSURE: 143 MMHG | DIASTOLIC BLOOD PRESSURE: 93 MMHG | RESPIRATION RATE: 18 BRPM | OXYGEN SATURATION: 96 % | BODY MASS INDEX: 22.65 KG/M2 | WEIGHT: 161.82 LBS | TEMPERATURE: 98.2 F | HEART RATE: 60 BPM

## 2020-03-17 LAB
ALBUMIN SERPL BCP-MCNC: 4 G/DL (ref 3.2–4.9)
ALBUMIN/GLOB SERPL: 1.3 G/DL
ALP SERPL-CCNC: 76 U/L (ref 30–99)
ALT SERPL-CCNC: 45 U/L (ref 2–50)
ANION GAP SERPL CALC-SCNC: 6 MMOL/L (ref 7–16)
AST SERPL-CCNC: 26 U/L (ref 12–45)
BASOPHILS # BLD AUTO: 0.6 % (ref 0–1.8)
BASOPHILS # BLD: 0.08 K/UL (ref 0–0.12)
BILIRUB SERPL-MCNC: 0.3 MG/DL (ref 0.1–1.5)
BUN SERPL-MCNC: 12 MG/DL (ref 8–22)
CALCIUM SERPL-MCNC: 9.7 MG/DL (ref 8.5–10.5)
CHLORIDE SERPL-SCNC: 103 MMOL/L (ref 96–112)
CO2 SERPL-SCNC: 28 MMOL/L (ref 20–33)
CREAT SERPL-MCNC: 0.97 MG/DL (ref 0.5–1.4)
EOSINOPHIL # BLD AUTO: 0.05 K/UL (ref 0–0.51)
EOSINOPHIL NFR BLD: 0.4 % (ref 0–6.9)
ERYTHROCYTE [DISTWIDTH] IN BLOOD BY AUTOMATED COUNT: 40.9 FL (ref 35.9–50)
GLOBULIN SER CALC-MCNC: 3.2 G/DL (ref 1.9–3.5)
GLUCOSE SERPL-MCNC: 115 MG/DL (ref 65–99)
HCT VFR BLD AUTO: 48.9 % (ref 42–52)
HGB BLD-MCNC: 16.2 G/DL (ref 14–18)
IMM GRANULOCYTES # BLD AUTO: 0.38 K/UL (ref 0–0.11)
IMM GRANULOCYTES NFR BLD AUTO: 2.8 % (ref 0–0.9)
LYMPHOCYTES # BLD AUTO: 2.18 K/UL (ref 1–4.8)
LYMPHOCYTES NFR BLD: 16.2 % (ref 22–41)
MCH RBC QN AUTO: 29.9 PG (ref 27–33)
MCHC RBC AUTO-ENTMCNC: 33.1 G/DL (ref 33.7–35.3)
MCV RBC AUTO: 90.4 FL (ref 81.4–97.8)
MONOCYTES # BLD AUTO: 0.8 K/UL (ref 0–0.85)
MONOCYTES NFR BLD AUTO: 5.9 % (ref 0–13.4)
NEUTROPHILS # BLD AUTO: 9.98 K/UL (ref 1.82–7.42)
NEUTROPHILS NFR BLD: 74.1 % (ref 44–72)
NRBC # BLD AUTO: 0 K/UL
NRBC BLD-RTO: 0 /100 WBC
PLATELET # BLD AUTO: 560 K/UL (ref 164–446)
PMV BLD AUTO: 8.4 FL (ref 9–12.9)
POTASSIUM SERPL-SCNC: 4.2 MMOL/L (ref 3.6–5.5)
PROT SERPL-MCNC: 7.2 G/DL (ref 6–8.2)
RBC # BLD AUTO: 5.41 M/UL (ref 4.7–6.1)
SODIUM SERPL-SCNC: 137 MMOL/L (ref 135–145)
WBC # BLD AUTO: 13.5 K/UL (ref 4.8–10.8)

## 2020-03-17 PROCEDURE — A9270 NON-COVERED ITEM OR SERVICE: HCPCS | Performed by: STUDENT IN AN ORGANIZED HEALTH CARE EDUCATION/TRAINING PROGRAM

## 2020-03-17 PROCEDURE — 700102 HCHG RX REV CODE 250 W/ 637 OVERRIDE(OP): Performed by: STUDENT IN AN ORGANIZED HEALTH CARE EDUCATION/TRAINING PROGRAM

## 2020-03-17 PROCEDURE — 700111 HCHG RX REV CODE 636 W/ 250 OVERRIDE (IP): Performed by: STUDENT IN AN ORGANIZED HEALTH CARE EDUCATION/TRAINING PROGRAM

## 2020-03-17 PROCEDURE — 700105 HCHG RX REV CODE 258: Performed by: STUDENT IN AN ORGANIZED HEALTH CARE EDUCATION/TRAINING PROGRAM

## 2020-03-17 PROCEDURE — 85025 COMPLETE CBC W/AUTO DIFF WBC: CPT

## 2020-03-17 PROCEDURE — 36415 COLL VENOUS BLD VENIPUNCTURE: CPT

## 2020-03-17 PROCEDURE — 80053 COMPREHEN METABOLIC PANEL: CPT

## 2020-03-17 RX ORDER — GUAIFENESIN 600 MG/1
600 TABLET, EXTENDED RELEASE ORAL EVERY 12 HOURS
Qty: 28 TAB | Refills: 0 | Status: SHIPPED | OUTPATIENT
Start: 2020-03-17

## 2020-03-17 RX ORDER — AMOXICILLIN 500 MG/1
1000 CAPSULE ORAL 3 TIMES DAILY
Qty: 30 CAP | Refills: 0 | Status: SHIPPED | OUTPATIENT
Start: 2020-03-17 | End: 2022-01-18

## 2020-03-17 RX ADMIN — ENOXAPARIN SODIUM 40 MG: 100 INJECTION SUBCUTANEOUS at 05:04

## 2020-03-17 RX ADMIN — GUAIFENESIN 600 MG: 600 TABLET, EXTENDED RELEASE ORAL at 05:03

## 2020-03-17 RX ADMIN — CEFTRIAXONE SODIUM 2 G: 2 INJECTION, POWDER, FOR SOLUTION INTRAMUSCULAR; INTRAVENOUS at 05:03

## 2020-03-17 ASSESSMENT — ENCOUNTER SYMPTOMS
BLOOD IN STOOL: 0
FOCAL WEAKNESS: 0
BLURRED VISION: 0
SENSORY CHANGE: 0
PALPITATIONS: 0
COUGH: 0
VOMITING: 0
DIARRHEA: 0
HEARTBURN: 0
NAUSEA: 0
ORTHOPNEA: 0
FEVER: 0
DOUBLE VISION: 0
SHORTNESS OF BREATH: 0
MYALGIAS: 0
SORE THROAT: 0
ABDOMINAL PAIN: 0
HEADACHES: 0
CHILLS: 0

## 2020-03-17 NOTE — DISCHARGE PLANNING
"SW talked with patient regarding recourses needed for D/C. Patient reports he has been homeless \"a long time\" and he knows \"all the resources you could possibly give me.\" Patient reports he does not need any additional resources. Patient reports he will D/C back to his 6-man tent.       "

## 2020-03-17 NOTE — PROGRESS NOTES
Patient observed yelling at [facility] because his IV pump was beeping due to an up stream occlusion. Provided patient a verbal discussion as to why such aggressive behavior was unacceptable, unwarranted and would not be tolerated any further. Patient refused acknowledgement of education. Informed client that his IV pump would beep again once the infusion was complete and to use his call light to call for assistance at that time. Patient verbalized understanding.

## 2020-03-17 NOTE — PROGRESS NOTES
"Received bedside report from RN, pt care assumed, VSS, pt assessment complete. Pt AAOx4,  C/o wanting to be discharged. Stated that he is \" leaving today regardless\" 0/10 pain at this time. No signs of acute distress noted at this time. POC discussed with pt and verbalizes no questions. Pt denies any additional needs at this time. Bed in lowest position, pt educated on fall risk and verbalized understanding, call light within reach, hourly rounding initiated.   "

## 2020-03-17 NOTE — PROGRESS NOTES
Received bedside report from RN [facility], pt care assumed, VSS, pt assessment complete. Pt AAOx4, no c/o pain at this time. No signs of acute distress noted at this time. POC discussed with pt and verbalizes no questions. Pt denies any additional needs at this time. Bed in lowest position, bed alarm off as client is up to self ad ana, pt educated on fall risk and verbalized understanding, call light within reach, hourly rounding initiated.

## 2020-03-17 NOTE — DISCHARGE INSTRUCTIONS
Discharge Instructions    Discharged to home by car with friend. Discharged via walking, hospital escort: Refused.  Special equipment needed: Not Applicable    Be sure to schedule a follow-up appointment with your primary care doctor or any specialists as instructed.     Discharge Plan:   Influenza Vaccine Indication: Patient Refuses    I understand that a diet low in cholesterol, fat, and sodium is recommended for good health. Unless I have been given specific instructions below for another diet, I accept this instruction as my diet prescription.   Other diet: regular    Special Instructions: None    · Is patient discharged on Warfarin / Coumadin?   No     Depression / Suicide Risk    As you are discharged from this [facility] facility, it is important to learn how to keep safe from harming yourself.    Recognize the warning signs:  · Abrupt changes in personality, positive or negative- including increase in energy   · Giving away possessions  · Change in eating patterns- significant weight changes-  positive or negative  · Change in sleeping patterns- unable to sleep or sleeping all the time   · Unwillingness or inability to communicate  · Depression  · Unusual sadness, discouragement and loneliness  · Talk of wanting to die  · Neglect of personal appearance   · Rebelliousness- reckless behavior  · Withdrawal from people/activities they love  · Confusion- inability to concentrate     If you or a loved one observes any of these behaviors or has concerns about self-harm, here's what you can do:  · Talk about it- your feelings and reasons for harming yourself  · Remove any means that you might use to hurt yourself (examples: pills, rope, extension cords, firearm)  · Get professional help from the community (Mental Health, Substance Abuse, psychological counseling)  · Do not be alone:Call your Safe Contact- someone whom you trust who will be there for you.  · Call your local CRISIS HOTLINE 467-2247 or  519.934.1614  · Call your local Children's Mobile Crisis Response Team [place] (695) 381-5957 or www.RunAlong  · Call the toll free National Suicide Prevention Hotlines   · National Suicide Prevention Lifeline 409-362-MISZ (6912)  · [place] Line Network 800-SUICIDE (900-5373)    # Please take amoxicillin 1000 mg (2 tb) three times a day for 5 days.  # Please see your primary care physician    Community-Acquired Pneumonia, Adult  Introduction  Pneumonia is an infection of the lungs. One type of pneumonia can happen while a person is in a hospital. A different type can happen when a person is not in a hospital (community-acquired pneumonia). It is easy for this kind to spread from person to person. It can spread to you if you breathe near an infected person who coughs or sneezes. Some symptoms include:  · A dry cough.  · A wet (productive) cough.  · Fever.  · Sweating.  · Chest pain.  Follow these instructions at home:  · Take over-the-counter and prescription medicines only as told by your doctor.  ¨ Only take cough medicine if you are losing sleep.  ¨ If you were prescribed an antibiotic medicine, take it as told by your doctor. Do not stop taking the antibiotic even if you start to feel better.  · Sleep with your head and neck raised (elevated). You can do this by putting a few pillows under your head, or you can sleep in a recliner.  · Do not use tobacco products. These include cigarettes, chewing tobacco, and e-cigarettes. If you need help quitting, ask your doctor.  · Drink enough water to keep your pee (urine) clear or pale yellow.  A shot (vaccine) can help prevent pneumonia. Shots are often suggested for:  · People older than 65 years of age.  · People older than 19 years of age:  ¨ Who are having cancer treatment.  ¨ Who have long-term (chronic) lung disease.  ¨ Who have problems with their body's defense system (immune system).  You may also prevent pneumonia if you take these  actions:  · Get the flu (influenza) shot every year.  · Go to the dentist as often as told.  · Wash your hands often. If soap and water are not available, use hand .  Contact a doctor if:  · You have a fever.  · You lose sleep because your cough medicine does not help.  Get help right away if:  · You are short of breath and it gets worse.  · You have more chest pain.  · Your sickness gets worse. This is very serious if:  ¨ You are an older adult.  ¨ Your body's defense system is weak.  · You cough up blood.      Sepsis, Adult  Sepsis is a serious infection of your blood or tissues that affects your whole body. The infection that causes sepsis may be bacterial, viral, fungal, or parasitic. Sepsis may be life threatening. Sepsis can cause your blood pressure to drop. This may result in shock. Shock causes your central nervous system and your organs to stop working correctly.  What increases the risk?  Sepsis can happen in anyone, but it is more likely to happen in people who have weakened immune systems.  What are the signs or symptoms?  Symptoms of sepsis can include:  · Fever or low body temperature (hypothermia).  · Rapid breathing (hyperventilation).  · Chills.  · Rapid heartbeat (tachycardia).  · Confusion or light-headedness.  · Trouble breathing.  · Urinating much less than usual.  · Cool, clammy skin or red, flushed skin.  · Other problems with the heart, kidneys, or brain.  How is this diagnosed?  Your health care provider will likely do tests to look for an infection, to see if the infection has spread to your blood, and to see how serious your condition is. Tests can include:  · Blood tests, including cultures of your blood.  · Cultures of other fluids from your body, such as:  ¨ Urine.  ¨ Pus from wounds.  ¨ Mucus coughed up from your lungs.  · Urine tests other than cultures.  · X-ray exams or other imaging tests.  How is this treated?  Treatment will begin with elimination of the source of  infection. If your sepsis is likely caused by a bacterial or fungal infection, you will be given antibiotic or antifungal medicines.  You may also receive:  · Oxygen.  · Fluids through an IV tube.  · Medicines to increase your blood pressure.  · A machine to clean your blood (dialysis) if your kidneys fail.  · A machine to help you breathe if your lungs fail.  Get help right away if:  You get an infection or develop any of the signs and symptoms of sepsis after surgery or a hospitalization.  This information is not intended to replace advice given to you by your health care provider. Make sure you discuss any questions you have with your health care provider.  Document Released: 09/15/2004 Document Revised: 05/25/2017 Document Reviewed: 08/25/2014  [employer] Interactive Patient Education © 2017 [employer]    This information is not intended to replace advice given to you by your health care provider. Make sure you discuss any questions you have with your health care provider.  Document Released: 06/05/2009 Document Revised: 05/25/2017 Document Reviewed: 04/13/2016  © 2017 [employer]

## 2020-03-17 NOTE — DISCHARGE PLANNING
Medication reconcilliation completed. Medications delivered to patient at bedside. Patient counseled.       Harvinder Gabriel   Home Medication Instructions BEATRIZ:[id number]    Printed on:03/17/20 8527   Medication Information                      amoxicillin (AMOXIL) 500 MG Cap  Take 2 Caps by mouth 3 times a day.             guaiFENesin ER (MUCINEX) 600 MG TABLET SR 12 HR  Take 1 Tab by mouth every 12 hours.

## 2020-03-17 NOTE — CARE PLAN
Problem: Infection  Goal: Will remain free from infection  Outcome: PROGRESSING AS EXPECTED  Intervention: Assess signs and symptoms of infection  Note: Assessed client's wound and placed wound LDA for WC consult. Provided patient with a verbal discussion related to signs of dermal infection.      Problem: Discharge Barriers/Planning  Goal: Patient's continuum of care needs will be met  Outcome: PROGRESSING AS EXPECTED  Note: Provided patient with a discussion related to POC. Discussed ppan to possibly transition patient from IV to PO antibiotics in order to DC. Patient very eager to discharge.

## 2020-03-17 NOTE — PROGRESS NOTES
Daily Progress Note:     Date of Service: 3/17/2020  Primary Team: [facility] Blue Team   Attending: Lyubov Siegel M.D.  Senior Resident: Dr. Garza  Intern: Dr. Jackson  Contact:  [phone]    - Overnight events: No overnight events  - Subjective: He is feeling fine and wanted to go home before afternoon. No shortness of breath, cough, chest pain, nausea or vomiting.   - Vitals: Saturating 96% on room air. Temperature 98.1, BP is stable. RR is 17.  - Pertinent physical: Clear lung auscultation. No crackles, wheezing.  - Labs/Cultures: WBC 11.8, no abnormalities on labs.  - Imaging: Echocardiography showing no LV dysfunction, endocarditis. EF 55%.  - Consults: No consults   - Interventions: No interventions  - Med changes: Will be discharged on amoxicillin 1000 mg TID.  - Daytime events No events.  - Discharge plan: To home (patient is homeless and wants to go his tent)  talked with the patient. Patient denied any need for additional resources.       =====================================    Review of Systems:    Review of Systems   Constitutional: Negative for chills and fever.   HENT: Negative for sore throat.    Eyes: Negative for blurred vision and double vision.   Respiratory: Negative for cough and shortness of breath.    Cardiovascular: Negative for chest pain, palpitations, orthopnea and leg swelling.   Gastrointestinal: Negative for abdominal pain, blood in stool, diarrhea, heartburn, melena, nausea and vomiting.   Genitourinary: Negative for dysuria.   Musculoskeletal: Negative for myalgias.   Neurological: Negative for sensory change, focal weakness and headaches.       Objective Data:   Physical Exam:   Vitals:   Temp:  [36.2 °C (97.1 °F)-36.8 °C (98.2 °F)] 36.8 °C (98.2 °F)  Pulse:  [] 60  Resp:  [17-18] 18  BP: (124-157)/(86-97) 143/93  SpO2:  [95 %-97 %] 96 %     Physical Exam  Vitals signs reviewed.   Constitutional:       General: He is not in acute distress.      Appearance: He is not ill-appearing.   HENT:      Head: Normocephalic.      Nose: Nose normal. No congestion or rhinorrhea.      Mouth/Throat:      Mouth: Mucous membranes are moist.   Eyes:      Extraocular Movements: Extraocular movements intact.   Neck:      Musculoskeletal: Neck supple.   Cardiovascular:      Rate and Rhythm: Normal rate and regular rhythm.      Pulses: Normal pulses.      Heart sounds: Normal heart sounds.   Pulmonary:      Effort: Pulmonary effort is normal. No respiratory distress.      Breath sounds: No wheezing, rhonchi or rales.   Abdominal:      Palpations: Abdomen is soft.      Tenderness: There is no abdominal tenderness. There is no rebound.   Musculoskeletal:      Right lower leg: No edema.      Left lower leg: No edema.   Skin:     Findings: No rash.   Neurological:      General: No focal deficit present.      Mental Status: He is oriented to person, place, and time.      Cranial Nerves: No cranial nerve deficit.      Motor: No weakness.   Psychiatric:         Mood and Affect: Mood normal.         * RLL pneumonia (HCC)- (present on admission)  Assessment & Plan  -does not meet criteria for COVID screening  -Flu negative.  -Likely strep pneumonia given characteristic lobar consolidation on imaging, strep species growing on blood cultures.  -Incentive spirometry, supplemental oxygen as needed, RT per protocol.  - IV ceftriaxone grams given till 3/17/20 for a total of 9 days will be followed up by amoxicillin 1 g 3 times daily for 5 days on discharge    Methamphetamine abuse (HCC)- (present on admission)  Assessment & Plan  -H/o methamphetamine use, last use night prior to presentation to ED 3/8/2020  -Denies past IV drug use or alcohol use.  -Counseled to quit, to be continued as outpatient.  - Echo 3/16/2020 normal LV function EF 55%. No signs of endocarditis.    Bacteremia- (present on admission)  Assessment & Plan  - 3/8/2020: BC- Streptococcus viridans  - 3/10: BC-NGTD  - No  "evidence of endocarditis  - IV ceftriaxone grams given till 3/17/20 for a total of 9 days will be followed up by amoxicillin 1 g 3 times daily for 5 days on discharge  - Echo 3/16/2020 normal LV function. No signs of endocarditis.    Tachycardia- (present on admission)  Assessment & Plan  -Sinus tachycardia in setting of sepsis syndrome and methamphetamine intoxication  -Will Monitor    Acute respiratory failure with hypoxia (HCC)- (present on admission)  Assessment & Plan  -Resolved  -In setting of community-acquired pneumonia.  -See \"RLL pneumonia\" for details.    Sepsis (HCC)- (present on admission)  Assessment & Plan  -Resolved   -IV ceftriaxone grams given till 3/17/20 for a total of 9 days will be followed up by amoxicillin 1 g 3 times daily for 5 days on discharge      "

## 2020-03-17 NOTE — PROGRESS NOTES
Patient with  Orders to discharge home. Discharge instructions given and patient verbalized understanding. Prescription delivered at bedside via meds to bed. Iv removed. Patient refusing  escort out building. Left floor per self.

## 2020-04-25 ENCOUNTER — APPOINTMENT (OUTPATIENT)
Dept: RADIOLOGY | Facility: MEDICAL CENTER | Age: 40
End: 2020-04-25
Payer: MEDICAID

## 2020-04-25 ENCOUNTER — HOSPITAL ENCOUNTER (EMERGENCY)
Facility: MEDICAL CENTER | Age: 40
End: 2020-04-25
Attending: EMERGENCY MEDICINE
Payer: MEDICAID

## 2020-04-25 VITALS
DIASTOLIC BLOOD PRESSURE: 89 MMHG | TEMPERATURE: 97.9 F | OXYGEN SATURATION: 96 % | RESPIRATION RATE: 17 BRPM | HEIGHT: 71 IN | BODY MASS INDEX: 28 KG/M2 | SYSTOLIC BLOOD PRESSURE: 149 MMHG | HEART RATE: 94 BPM | WEIGHT: 200 LBS

## 2020-04-25 LAB
ABO + RH BLD: NORMAL
ABO GROUP BLD: NORMAL
ALBUMIN SERPL BCP-MCNC: 3.9 G/DL (ref 3.2–4.9)
ALBUMIN/GLOB SERPL: 1.6 G/DL
ALP SERPL-CCNC: 81 U/L (ref 30–99)
ALT SERPL-CCNC: 15 U/L (ref 2–50)
ANION GAP SERPL CALC-SCNC: 12 MMOL/L (ref 7–16)
APTT PPP: 26.9 SEC (ref 24.7–36)
AST SERPL-CCNC: 23 U/L (ref 12–45)
BILIRUB SERPL-MCNC: 0.4 MG/DL (ref 0.1–1.5)
BLD GP AB SCN SERPL QL: NORMAL
BUN SERPL-MCNC: 15 MG/DL (ref 8–22)
CALCIUM SERPL-MCNC: 8.7 MG/DL (ref 8.5–10.5)
CHLORIDE SERPL-SCNC: 105 MMOL/L (ref 96–112)
CO2 SERPL-SCNC: 21 MMOL/L (ref 20–33)
COHGB MFR BLD: 1.5 % (ref 0–4.9)
COVID ORDER STATUS COVID19: NORMAL
CREAT SERPL-MCNC: 0.93 MG/DL (ref 0.5–1.4)
ERYTHROCYTE [DISTWIDTH] IN BLOOD BY AUTOMATED COUNT: 39.8 FL (ref 35.9–50)
ETHANOL BLD-MCNC: <10.1 MG/DL (ref 0–10.1)
GLOBULIN SER CALC-MCNC: 2.4 G/DL (ref 1.9–3.5)
GLUCOSE SERPL-MCNC: 141 MG/DL (ref 65–99)
HCT VFR BLD AUTO: 41.6 % (ref 42–52)
HGB BLD-MCNC: 14.3 G/DL (ref 14–18)
INR PPP: 0.97 (ref 0.87–1.13)
MCH RBC QN AUTO: 30 PG (ref 27–33)
MCHC RBC AUTO-ENTMCNC: 34.4 G/DL (ref 33.7–35.3)
MCV RBC AUTO: 87.4 FL (ref 81.4–97.8)
PLATELET # BLD AUTO: 306 K/UL (ref 164–446)
PMV BLD AUTO: 8.7 FL (ref 9–12.9)
POTASSIUM SERPL-SCNC: 3.8 MMOL/L (ref 3.6–5.5)
PROT SERPL-MCNC: 6.3 G/DL (ref 6–8.2)
PROTHROMBIN TIME: 13.1 SEC (ref 12–14.6)
RBC # BLD AUTO: 4.76 M/UL (ref 4.7–6.1)
RH BLD: NORMAL
SARS-COV-2 RNA RESP QL NAA+PROBE: NEGATIVE
SODIUM SERPL-SCNC: 138 MMOL/L (ref 135–145)
SPECIMEN SOURCE: NORMAL
WBC # BLD AUTO: 7.9 K/UL (ref 4.8–10.8)

## 2020-04-25 PROCEDURE — 96376 TX/PRO/DX INJ SAME DRUG ADON: CPT | Mod: XU

## 2020-04-25 PROCEDURE — 700105 HCHG RX REV CODE 258: Performed by: EMERGENCY MEDICINE

## 2020-04-25 PROCEDURE — 700102 HCHG RX REV CODE 250 W/ 637 OVERRIDE(OP): Performed by: EMERGENCY MEDICINE

## 2020-04-25 PROCEDURE — 86901 BLOOD TYPING SEROLOGIC RH(D): CPT

## 2020-04-25 PROCEDURE — 82600 ASSAY OF CYANIDE: CPT

## 2020-04-25 PROCEDURE — 80307 DRUG TEST PRSMV CHEM ANLYZR: CPT

## 2020-04-25 PROCEDURE — 16025 DRESS/DEBRID P-THICK BURN M: CPT

## 2020-04-25 PROCEDURE — A9270 NON-COVERED ITEM OR SERVICE: HCPCS | Performed by: EMERGENCY MEDICINE

## 2020-04-25 PROCEDURE — 85610 PROTHROMBIN TIME: CPT

## 2020-04-25 PROCEDURE — 71045 X-RAY EXAM CHEST 1 VIEW: CPT

## 2020-04-25 PROCEDURE — 90471 IMMUNIZATION ADMIN: CPT

## 2020-04-25 PROCEDURE — G2023 SPECIMEN COLLECT COVID-19: HCPCS | Performed by: EMERGENCY MEDICINE

## 2020-04-25 PROCEDURE — 96375 TX/PRO/DX INJ NEW DRUG ADDON: CPT

## 2020-04-25 PROCEDURE — 80053 COMPREHEN METABOLIC PANEL: CPT

## 2020-04-25 PROCEDURE — 700111 HCHG RX REV CODE 636 W/ 250 OVERRIDE (IP): Performed by: EMERGENCY MEDICINE

## 2020-04-25 PROCEDURE — 86900 BLOOD TYPING SEROLOGIC ABO: CPT

## 2020-04-25 PROCEDURE — 85730 THROMBOPLASTIN TIME PARTIAL: CPT

## 2020-04-25 PROCEDURE — 96365 THER/PROPH/DIAG IV INF INIT: CPT | Mod: XU

## 2020-04-25 PROCEDURE — 82375 ASSAY CARBOXYHB QUANT: CPT

## 2020-04-25 PROCEDURE — U0004 COV-19 TEST NON-CDC HGH THRU: HCPCS

## 2020-04-25 PROCEDURE — 85027 COMPLETE CBC AUTOMATED: CPT

## 2020-04-25 PROCEDURE — 86850 RBC ANTIBODY SCREEN: CPT

## 2020-04-25 PROCEDURE — 99285 EMERGENCY DEPT VISIT HI MDM: CPT

## 2020-04-25 PROCEDURE — 90715 TDAP VACCINE 7 YRS/> IM: CPT | Performed by: EMERGENCY MEDICINE

## 2020-04-25 PROCEDURE — 305948 HCHG GREEN TRAUMA ACT PRE-NOTIFY NO CC

## 2020-04-25 RX ORDER — SODIUM CHLORIDE 9 MG/ML
1000 INJECTION, SOLUTION INTRAVENOUS ONCE
Status: COMPLETED | OUTPATIENT
Start: 2020-04-25 | End: 2020-04-25

## 2020-04-25 RX ADMIN — FENTANYL CITRATE 100 MCG: 50 INJECTION, SOLUTION INTRAMUSCULAR; INTRAVENOUS at 04:13

## 2020-04-25 RX ADMIN — CLOSTRIDIUM TETANI TOXOID ANTIGEN (FORMALDEHYDE INACTIVATED), CORYNEBACTERIUM DIPHTHERIAE TOXOID ANTIGEN (FORMALDEHYDE INACTIVATED), BORDETELLA PERTUSSIS TOXOID ANTIGEN (GLUTARALDEHYDE INACTIVATED), BORDETELLA PERTUSSIS FILAMENTOUS HEMAGGLUTININ ANTIGEN (FORMALDEHYDE INACTIVATED), BORDETELLA PERTUSSIS PERTACTIN ANTIGEN, AND BORDETELLA PERTUSSIS FIMBRIAE 2/3 ANTIGEN 0.5 ML: 5; 2; 2.5; 5; 3; 5 INJECTION, SUSPENSION INTRAMUSCULAR at 04:20

## 2020-04-25 RX ADMIN — SODIUM CHLORIDE 1000 ML: 9 INJECTION, SOLUTION INTRAVENOUS at 04:45

## 2020-04-25 RX ADMIN — SILVER SULFADIAZINE 25 G: 10 CREAM TOPICAL at 04:45

## 2020-04-25 RX ADMIN — FENTANYL CITRATE 100 MCG: 0.05 INJECTION, SOLUTION INTRAMUSCULAR; INTRAVENOUS at 05:02

## 2020-04-25 RX ADMIN — CEFTRIAXONE SODIUM 2 G: 2 INJECTION, POWDER, FOR SOLUTION INTRAMUSCULAR; INTRAVENOUS at 04:45
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
## 2020-04-25 NOTE — ED NOTES
Pt transferred to [place] [facility] via [facility]. Report to Jacobo BARRETT at [place] [facility] and [facility].  MARLYS. NAD.
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
## 2020-04-29 LAB — CYANIDE BLD-MCNC: <10 UG/DL

## 2020-05-11 ENCOUNTER — APPOINTMENT (OUTPATIENT)
Dept: RADIOLOGY | Facility: MEDICAL CENTER | Age: 40
End: 2020-05-11
Attending: SURGERY
Payer: MEDICAID

## 2020-05-11 ENCOUNTER — HOSPITAL ENCOUNTER (EMERGENCY)
Facility: MEDICAL CENTER | Age: 40
End: 2020-05-11
Attending: EMERGENCY MEDICINE
Payer: MEDICAID

## 2020-05-11 VITALS
HEIGHT: 69 IN | TEMPERATURE: 98.2 F | RESPIRATION RATE: 15 BRPM | OXYGEN SATURATION: 99 % | HEART RATE: 82 BPM | WEIGHT: 160 LBS | DIASTOLIC BLOOD PRESSURE: 81 MMHG | BODY MASS INDEX: 23.7 KG/M2 | SYSTOLIC BLOOD PRESSURE: 133 MMHG

## 2020-05-11 DIAGNOSIS — S00.83XA CONTUSION OF FACE, INITIAL ENCOUNTER: ICD-10-CM
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
## 2020-05-11 DIAGNOSIS — T24.009A: ICD-10-CM

## 2020-05-11 LAB
ABO GROUP BLD: NORMAL
ALBUMIN SERPL BCP-MCNC: 3.7 G/DL (ref 3.2–4.9)
ALBUMIN/GLOB SERPL: 1.2 G/DL
ALP SERPL-CCNC: 81 U/L (ref 30–99)
ALT SERPL-CCNC: 30 U/L (ref 2–50)
ANION GAP SERPL CALC-SCNC: 13 MMOL/L (ref 7–16)
APTT PPP: 26.8 SEC (ref 24.7–36)
AST SERPL-CCNC: 17 U/L (ref 12–45)
BASOPHILS # BLD AUTO: 0.8 % (ref 0–1.8)
BASOPHILS # BLD: 0.08 K/UL (ref 0–0.12)
BILIRUB SERPL-MCNC: 0.4 MG/DL (ref 0.1–1.5)
BLD GP AB SCN SERPL QL: NORMAL
BUN SERPL-MCNC: 14 MG/DL (ref 8–22)
CALCIUM SERPL-MCNC: 9.3 MG/DL (ref 8.5–10.5)
CHLORIDE SERPL-SCNC: 103 MMOL/L (ref 96–112)
CO2 SERPL-SCNC: 22 MMOL/L (ref 20–33)
CREAT SERPL-MCNC: 0.95 MG/DL (ref 0.5–1.4)
EOSINOPHIL # BLD AUTO: 0.14 K/UL (ref 0–0.51)
EOSINOPHIL NFR BLD: 1.3 % (ref 0–6.9)
ERYTHROCYTE [DISTWIDTH] IN BLOOD BY AUTOMATED COUNT: 40.9 FL (ref 35.9–50)
ETHANOL BLD-MCNC: <10.1 MG/DL (ref 0–10.1)
GLOBULIN SER CALC-MCNC: 3 G/DL (ref 1.9–3.5)
GLUCOSE SERPL-MCNC: 114 MG/DL (ref 65–99)
HCT VFR BLD AUTO: 44 % (ref 42–52)
HGB BLD-MCNC: 14.6 G/DL (ref 14–18)
IMM GRANULOCYTES # BLD AUTO: 0.06 K/UL (ref 0–0.11)
IMM GRANULOCYTES NFR BLD AUTO: 0.6 % (ref 0–0.9)
INR PPP: 0.94 (ref 0.87–1.13)
LYMPHOCYTES # BLD AUTO: 1.63 K/UL (ref 1–4.8)
LYMPHOCYTES NFR BLD: 15.3 % (ref 22–41)
MCH RBC QN AUTO: 29.7 PG (ref 27–33)
MCHC RBC AUTO-ENTMCNC: 33.2 G/DL (ref 33.7–35.3)
MCV RBC AUTO: 89.4 FL (ref 81.4–97.8)
MONOCYTES # BLD AUTO: 0.91 K/UL (ref 0–0.85)
MONOCYTES NFR BLD AUTO: 8.5 % (ref 0–13.4)
NEUTROPHILS # BLD AUTO: 7.83 K/UL (ref 1.82–7.42)
NEUTROPHILS NFR BLD: 73.5 % (ref 44–72)
NRBC # BLD AUTO: 0 K/UL
NRBC BLD-RTO: 0 /100 WBC
PLATELET # BLD AUTO: 493 K/UL (ref 164–446)
PMV BLD AUTO: 8.5 FL (ref 9–12.9)
POTASSIUM SERPL-SCNC: 3.7 MMOL/L (ref 3.6–5.5)
PROT SERPL-MCNC: 6.7 G/DL (ref 6–8.2)
PROTHROMBIN TIME: 12.7 SEC (ref 12–14.6)
RBC # BLD AUTO: 4.92 M/UL (ref 4.7–6.1)
RH BLD: NORMAL
SODIUM SERPL-SCNC: 138 MMOL/L (ref 135–145)
WBC # BLD AUTO: 10.7 K/UL (ref 4.8–10.8)

## 2020-05-11 PROCEDURE — 80053 COMPREHEN METABOLIC PANEL: CPT

## 2020-05-11 PROCEDURE — 86901 BLOOD TYPING SEROLOGIC RH(D): CPT

## 2020-05-11 PROCEDURE — 700111 HCHG RX REV CODE 636 W/ 250 OVERRIDE (IP): Performed by: EMERGENCY MEDICINE

## 2020-05-11 PROCEDURE — 305949 HCHG RED TRAUMA ACT PRE-NOTIFY NO CC

## 2020-05-11 PROCEDURE — 700117 HCHG RX CONTRAST REV CODE 255: Performed by: SURGERY

## 2020-05-11 PROCEDURE — 99284 EMERGENCY DEPT VISIT MOD MDM: CPT

## 2020-05-11 PROCEDURE — 80307 DRUG TEST PRSMV CHEM ANLYZR: CPT

## 2020-05-11 PROCEDURE — 85025 COMPLETE CBC W/AUTO DIFF WBC: CPT

## 2020-05-11 PROCEDURE — 86850 RBC ANTIBODY SCREEN: CPT

## 2020-05-11 PROCEDURE — 74177 CT ABD & PELVIS W/CONTRAST: CPT

## 2020-05-11 PROCEDURE — 71045 X-RAY EXAM CHEST 1 VIEW: CPT

## 2020-05-11 PROCEDURE — 96374 THER/PROPH/DIAG INJ IV PUSH: CPT | Mod: XU

## 2020-05-11 PROCEDURE — 85730 THROMBOPLASTIN TIME PARTIAL: CPT

## 2020-05-11 PROCEDURE — 85610 PROTHROMBIN TIME: CPT

## 2020-05-11 PROCEDURE — 86900 BLOOD TYPING SEROLOGIC ABO: CPT

## 2020-05-11 RX ORDER — LORAZEPAM 2 MG/ML
INJECTION INTRAMUSCULAR
Status: COMPLETED | OUTPATIENT
Start: 2020-05-11 | End: 2020-05-11

## 2020-05-11 RX ADMIN — LORAZEPAM 1 MG: 2 INJECTION INTRAMUSCULAR; INTRAVENOUS at 10:37

## 2020-05-11 RX ADMIN — IOHEXOL 100 ML: 350 INJECTION, SOLUTION INTRAVENOUS at 10:46

## 2020-05-11 ASSESSMENT — LIFESTYLE VARIABLES
DOES PATIENT WANT TO STOP DRINKING: NO
DO YOU DRINK ALCOHOL: NO
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
## 2020-05-11 NOTE — DISCHARGE INSTRUCTIONS
Follow-up with general surgery this week for reevaluation, wound check.  Call Dr. Bolden's office, references emergency department visit and schedule appointment for follow-up.  Follow-up as previously indicated for burn care.    Tylenol or ibuprofen as needed for discomfort.  Continue to keep wounds clean, dry and intact.  Dressings as previously indicated.    Return to the emergency department for back pain, chest pain, shortness of breath, wound infection, altered mental status or other new concerns.
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
## 2022-01-18 ENCOUNTER — HOSPITAL ENCOUNTER (EMERGENCY)
Facility: MEDICAL CENTER | Age: 42
End: 2022-01-18
Attending: EMERGENCY MEDICINE
Payer: MEDICAID

## 2022-01-18 ENCOUNTER — PHARMACY VISIT (OUTPATIENT)
Dept: PHARMACY | Facility: MEDICAL CENTER | Age: 42
End: 2022-01-18
Payer: COMMERCIAL

## 2022-01-18 VITALS
BODY MASS INDEX: 25.62 KG/M2 | HEIGHT: 69 IN | OXYGEN SATURATION: 97 % | WEIGHT: 173 LBS | DIASTOLIC BLOOD PRESSURE: 92 MMHG | RESPIRATION RATE: 19 BRPM | SYSTOLIC BLOOD PRESSURE: 142 MMHG | HEART RATE: 78 BPM | TEMPERATURE: 97.8 F

## 2022-01-18 DIAGNOSIS — L03.116 CELLULITIS OF LEFT LOWER EXTREMITY: ICD-10-CM

## 2022-01-18 PROCEDURE — 700102 HCHG RX REV CODE 250 W/ 637 OVERRIDE(OP): Performed by: EMERGENCY MEDICINE

## 2022-01-18 PROCEDURE — 99283 EMERGENCY DEPT VISIT LOW MDM: CPT

## 2022-01-18 PROCEDURE — A9270 NON-COVERED ITEM OR SERVICE: HCPCS | Performed by: EMERGENCY MEDICINE

## 2022-01-18 PROCEDURE — RXMED WILLOW AMBULATORY MEDICATION CHARGE: Performed by: EMERGENCY MEDICINE

## 2022-01-18 RX ORDER — IBUPROFEN 600 MG/1
600 TABLET ORAL ONCE
Status: COMPLETED | OUTPATIENT
Start: 2022-01-18 | End: 2022-01-18

## 2022-01-18 RX ORDER — AMOXICILLIN 500 MG/1
500 CAPSULE ORAL 3 TIMES DAILY
Qty: 15 CAPSULE | Refills: 0 | Status: SHIPPED | OUTPATIENT
Start: 2022-01-18 | End: 2022-01-23

## 2022-01-18 RX ADMIN — IBUPROFEN 600 MG: 600 TABLET ORAL at 08:48

## 2022-01-18 ASSESSMENT — FIBROSIS 4 INDEX: FIB4 SCORE: 0.26

## 2022-01-18 NOTE — DISCHARGE INSTRUCTIONS
Please go directly to the pharmacy listed to  your prescription.  They will have it waiting for you.  Return to the emergency department if you develop any new or worsening symptoms including worsening pain, swelling, redness, drainage from the area, fevers, or any further concerns.  Please contact one of the community clinics in the area, you may contact [facility] listed above, schedule a follow-up appointment for skin recheck in 3 days.  If you are not having any improvement in 3 days, you are not able to follow-up with primary care, please return to the emergency department for recheck.
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
## 2022-01-18 NOTE — ED TRIAGE NOTES
"Chief Complaint   Patient presents with   • Wound Check     Pt reports it started as a bug bite, but since has increased in redness and swollen. Redness is under patient L knee. Pt reports it has been like this for a few months, but it started to get worse the last few days.Denies fevers, chills, N/V/D       42 yo M to triage for above complaint. GCS 15.     Pt is alert and oriented, speaking in full sentences, follows commands and responds appropriately to questions. NAD. Resp are even and unlabored.      Pt placed in lobby. Pt educated on triage process. Pt encouraged to alert staff for any changes.     Patient and staff wearing appropriate PPE    /101   Pulse 87   Temp 37.1 °C (98.8 °F) (Temporal)   Resp 18   Ht 1.753 m (5' 9\")   Wt 78.5 kg (173 lb)   SpO2 96%   BMI 25.55 kg/m²   "

## 2024-06-06 ENCOUNTER — HOSPITAL ENCOUNTER (EMERGENCY)
Facility: MEDICAL CENTER | Age: 44
End: 2024-06-06
Attending: EMERGENCY MEDICINE
Payer: MEDICAID

## 2024-06-06 ENCOUNTER — PHARMACY VISIT (OUTPATIENT)
Dept: PHARMACY | Facility: MEDICAL CENTER | Age: 44
End: 2024-06-06
Payer: COMMERCIAL

## 2024-06-06 VITALS
BODY MASS INDEX: 25.05 KG/M2 | HEART RATE: 102 BPM | TEMPERATURE: 97.6 F | DIASTOLIC BLOOD PRESSURE: 89 MMHG | SYSTOLIC BLOOD PRESSURE: 156 MMHG | WEIGHT: 175 LBS | OXYGEN SATURATION: 96 % | RESPIRATION RATE: 16 BRPM | HEIGHT: 70 IN

## 2024-06-06 DIAGNOSIS — Z51.89 ENCOUNTER FOR WOUND RE-CHECK: ICD-10-CM

## 2024-06-06 PROCEDURE — A9270 NON-COVERED ITEM OR SERVICE: HCPCS | Mod: UD | Performed by: EMERGENCY MEDICINE

## 2024-06-06 PROCEDURE — 700102 HCHG RX REV CODE 250 W/ 637 OVERRIDE(OP): Mod: UD | Performed by: EMERGENCY MEDICINE

## 2024-06-06 PROCEDURE — 99283 EMERGENCY DEPT VISIT LOW MDM: CPT

## 2024-06-06 PROCEDURE — RXMED WILLOW AMBULATORY MEDICATION CHARGE: Performed by: EMERGENCY MEDICINE

## 2024-06-06 RX ORDER — CEPHALEXIN 500 MG/1
500 CAPSULE ORAL 4 TIMES DAILY
Qty: 40 CAPSULE | Refills: 0 | Status: ACTIVE | OUTPATIENT
Start: 2024-06-06

## 2024-06-06 RX ORDER — LISINOPRIL 10 MG/1
10 TABLET ORAL
Status: DISCONTINUED | OUTPATIENT
Start: 2024-06-06 | End: 2024-06-06 | Stop reason: HOSPADM

## 2024-06-06 RX ORDER — LISINOPRIL 5 MG/1
10 TABLET ORAL DAILY
Qty: 30 TABLET | Refills: 0 | Status: SHIPPED | OUTPATIENT
Start: 2024-06-06

## 2024-06-06 RX ADMIN — LISINOPRIL 10 MG: 10 TABLET ORAL at 15:51

## 2024-06-06 ASSESSMENT — PAIN DESCRIPTION - PAIN TYPE: TYPE: ACUTE PAIN

## 2024-06-06 ASSESSMENT — FIBROSIS 4 INDEX: FIB4 SCORE: 0.53

## 2024-06-06 NOTE — ED PROVIDER NOTES
"ED Provider Note    CHIEF COMPLAINT  Chief Complaint   Patient presents with    Wound Check     Pt reports reoccurring wound to right buttock    Fatigue     Pt reports fatigue and HTN is suppose to be on lisinopril        EXTERNAL RECORDS REVIEWED  Outpatient Notes oncology, population health    HPI/ROS  LIMITATION TO HISTORY   Select: : None  OUTSIDE HISTORIAN(S):  None    Harvinder Gabriel is a 43 y.o. male who presents here for evaluation of wound check and fatigue.  Patient states that he has a chronic wound on his right buttock, that has been ongoing for many months.  He intermittently has some infections, that require antibiotics.  He also states that he has various small excoriations, that have trended to more \"cellulitis.\"  He has no fever chills no vomiting, but states that he needs antibiotics.  He also is here because he needs his lisinopril, for which was taken from him.  He has not taken in a few days.  He has no headache, chest pain, shortness of breath or abdominal pain.    PAST MEDICAL HISTORY   No bleeding disorders    SURGICAL HISTORY  patient denies any surgical history    FAMILY HISTORY  History reviewed. No pertinent family history.    SOCIAL HISTORY  Social History     Tobacco Use    Smoking status: Never    Smokeless tobacco: Never   Vaping Use    Vaping status: Never Used   Substance and Sexual Activity    Alcohol use: Not Currently    Drug use: Not Currently     Comment: Pot and meth    Sexual activity: Not on file       CURRENT MEDICATIONS  Home Medications       Reviewed by Gema Morris RFAISAL (Registered Nurse) on 06/06/24 at 1334  Med List Status: Not Addressed     Medication Last Dose Status   guaiFENesin ER (MUCINEX) 600 MG TABLET SR 12 HR  Active                    ALLERGIES  No Known Allergies    PHYSICAL EXAM  VITAL SIGNS: BP (!) 161/98   Pulse 100   Temp 36.4 °C (97.6 °F) (Temporal)   Resp 18   Ht 1.778 m (5' 10\")   Wt 79.4 kg (175 lb)   SpO2 93%   BMI 25.11 kg/m²  "   Constitutional: Disheveled, unkempt, no distress  HEENT: Normocephalic, atraumatic. Posterior pharynx clear and moist.  Eyes:  EOMI. Normal sclera.  Neck: Supple, Full range of motion, nontender.  Chest/Pulmonary: clear to ausculation. Symmetrical expansion.   Cardio: Regular rate and rhythm with no murmur.   Abdomen: Soft, nontender. No peritoneal signs. No guarding.   Musculoskeletal: No deformity, no edema, neurovascular intact.   Neuro: Clear speech, appropriate, cooperative, cranial nerves II-XII grossly intact.  Psych: Normal mood and affect  Skin; multiple areas of excoriation, and surrounding ulcerations, shallow, with mild erythema.  No induration or abscess.    EKG/LABS  None    RADIOLOGY/PROCEDURES   None        COURSE & MEDICAL DECISION MAKING    ASSESSMENT, COURSE AND PLAN  Care Narrative: This is a 43-year-old male here for evaluation of med refill for his lisinopril, and the need for antibiotics.  Patient states he recently lost his antibiotics and antihypertensive medications, so he needs these filled.  He has no medical complaints, no vomiting, or fever or chills.  Patient be given his meds here, and antibiotics to  at the pharmacy.  He has no abscesses at this time that require incision and drainage.      DISPOSITION AND DISCUSSIONS  I have discussed management of the patient with the following physicians and ADAM's: None    Discussion of management with other [facility] or appropriate source(s): None    Escalation of care considered, and ultimately not performed: IV fluids not indicated    Barriers to care at this time, including but not limited to: Patient does not have established PCP.     Decision tools and prescription drugs considered including, but not limited to: Keflex, lisinopril.    FINAL DIAGNOSIS  1. Encounter for wound re-check    2.       Medication refill       Electronically signed by: Beck Monae D.O., 6/6/2024 3:29 PM

## 2024-06-06 NOTE — ED NOTES
Patient discharged per order. Oral and written discharge instructions reviewed. Medications sent to [facility] pharmacy. Medications reviewed. All belongings accounted for and taken with patient. Questions answered, and patient agrees with discharge plan. Encouraged to follow up with PCP.

## 2024-06-06 NOTE — ED TRIAGE NOTES
"Chief Complaint   Patient presents with    Wound Check     Pt reports reoccurring wound to right buttock    Fatigue     Pt reports fatigue and HTN is suppose to be on lisinopril        [age]yo M to triage for above complaint. Pt rambling in triage. Pt states he was at [facility] yesterday but left when staff attempted to start IV, pt refusing any blood work in triage.      Pt placed in lobby. Pt educated on triage process. Pt encouraged to alert staff for any changes.     Patient and staff wearing appropriate PPE    BP (!) 165/127   Pulse (!) 126   Temp 36.4 °C (97.6 °F) (Temporal)   Resp 16   Ht 1.778 m (5' 10\")   Wt 79.4 kg (175 lb)   SpO2 98%   BMI 25.11 kg/m²     "

## 2024-10-07 ENCOUNTER — PHARMACY VISIT (OUTPATIENT)
Dept: PHARMACY | Facility: MEDICAL CENTER | Age: 44
End: 2024-10-07
Payer: COMMERCIAL

## 2024-10-07 ENCOUNTER — HOSPITAL ENCOUNTER (EMERGENCY)
Facility: MEDICAL CENTER | Age: 44
End: 2024-10-07
Attending: STUDENT IN AN ORGANIZED HEALTH CARE EDUCATION/TRAINING PROGRAM
Payer: MEDICAID

## 2024-10-07 VITALS
RESPIRATION RATE: 18 BRPM | TEMPERATURE: 98.2 F | WEIGHT: 210 LBS | HEIGHT: 70 IN | BODY MASS INDEX: 30.06 KG/M2 | OXYGEN SATURATION: 95 % | SYSTOLIC BLOOD PRESSURE: 177 MMHG | HEART RATE: 108 BPM | DIASTOLIC BLOOD PRESSURE: 108 MMHG

## 2024-10-07 DIAGNOSIS — L02.91 ABSCESS: Primary | ICD-10-CM

## 2024-10-07 LAB
ALBUMIN SERPL BCP-MCNC: 3.9 G/DL (ref 3.2–4.9)
ALBUMIN/GLOB SERPL: 1.2 G/DL
ALP SERPL-CCNC: 92 U/L (ref 30–99)
ALT SERPL-CCNC: 34 U/L (ref 2–50)
ANION GAP SERPL CALC-SCNC: 15 MMOL/L (ref 7–16)
AST SERPL-CCNC: 27 U/L (ref 12–45)
BASOPHILS # BLD AUTO: 0.2 % (ref 0–1.8)
BASOPHILS # BLD: 0.03 K/UL (ref 0–0.12)
BILIRUB SERPL-MCNC: 0.6 MG/DL (ref 0.1–1.5)
BUN SERPL-MCNC: 12 MG/DL (ref 8–22)
CALCIUM ALBUM COR SERPL-MCNC: 9.2 MG/DL (ref 8.5–10.5)
CALCIUM SERPL-MCNC: 9.1 MG/DL (ref 8.5–10.5)
CHLORIDE SERPL-SCNC: 103 MMOL/L (ref 96–112)
CO2 SERPL-SCNC: 20 MMOL/L (ref 20–33)
CREAT SERPL-MCNC: 0.72 MG/DL (ref 0.5–1.4)
EOSINOPHIL # BLD AUTO: 0.15 K/UL (ref 0–0.51)
EOSINOPHIL NFR BLD: 0.9 % (ref 0–6.9)
ERYTHROCYTE [DISTWIDTH] IN BLOOD BY AUTOMATED COUNT: 41.8 FL (ref 35.9–50)
GFR SERPLBLD CREATININE-BSD FMLA CKD-EPI: 115 ML/MIN/1.73 M 2
GLOBULIN SER CALC-MCNC: 3.2 G/DL (ref 1.9–3.5)
GLUCOSE SERPL-MCNC: 151 MG/DL (ref 65–99)
HCT VFR BLD AUTO: 44.3 % (ref 42–52)
HGB BLD-MCNC: 14.9 G/DL (ref 14–18)
IMM GRANULOCYTES # BLD AUTO: 0.1 K/UL (ref 0–0.11)
IMM GRANULOCYTES NFR BLD AUTO: 0.6 % (ref 0–0.9)
LYMPHOCYTES # BLD AUTO: 0.71 K/UL (ref 1–4.8)
LYMPHOCYTES NFR BLD: 4.5 % (ref 22–41)
MCH RBC QN AUTO: 29.7 PG (ref 27–33)
MCHC RBC AUTO-ENTMCNC: 33.6 G/DL (ref 32.3–36.5)
MCV RBC AUTO: 88.4 FL (ref 81.4–97.8)
MONOCYTES # BLD AUTO: 0.92 K/UL (ref 0–0.85)
MONOCYTES NFR BLD AUTO: 5.8 % (ref 0–13.4)
NEUTROPHILS # BLD AUTO: 14.03 K/UL (ref 1.82–7.42)
NEUTROPHILS NFR BLD: 88 % (ref 44–72)
NRBC # BLD AUTO: 0 K/UL
NRBC BLD-RTO: 0 /100 WBC (ref 0–0.2)
PLATELET # BLD AUTO: 321 K/UL (ref 164–446)
PMV BLD AUTO: 8.9 FL (ref 9–12.9)
POTASSIUM SERPL-SCNC: 3.7 MMOL/L (ref 3.6–5.5)
PROT SERPL-MCNC: 7.1 G/DL (ref 6–8.2)
RBC # BLD AUTO: 5.01 M/UL (ref 4.7–6.1)
SODIUM SERPL-SCNC: 138 MMOL/L (ref 135–145)
WBC # BLD AUTO: 15.9 K/UL (ref 4.8–10.8)

## 2024-10-07 PROCEDURE — 36415 COLL VENOUS BLD VENIPUNCTURE: CPT

## 2024-10-07 PROCEDURE — 80053 COMPREHEN METABOLIC PANEL: CPT

## 2024-10-07 PROCEDURE — 303977 HCHG I & D

## 2024-10-07 PROCEDURE — 96374 THER/PROPH/DIAG INJ IV PUSH: CPT

## 2024-10-07 PROCEDURE — 700111 HCHG RX REV CODE 636 W/ 250 OVERRIDE (IP): Mod: JZ,UD | Performed by: STUDENT IN AN ORGANIZED HEALTH CARE EDUCATION/TRAINING PROGRAM

## 2024-10-07 PROCEDURE — A9270 NON-COVERED ITEM OR SERVICE: HCPCS | Mod: UD | Performed by: STUDENT IN AN ORGANIZED HEALTH CARE EDUCATION/TRAINING PROGRAM

## 2024-10-07 PROCEDURE — 85025 COMPLETE CBC W/AUTO DIFF WBC: CPT

## 2024-10-07 PROCEDURE — RXMED WILLOW AMBULATORY MEDICATION CHARGE: Performed by: STUDENT IN AN ORGANIZED HEALTH CARE EDUCATION/TRAINING PROGRAM

## 2024-10-07 PROCEDURE — 99284 EMERGENCY DEPT VISIT MOD MDM: CPT

## 2024-10-07 PROCEDURE — 700102 HCHG RX REV CODE 250 W/ 637 OVERRIDE(OP): Mod: UD | Performed by: STUDENT IN AN ORGANIZED HEALTH CARE EDUCATION/TRAINING PROGRAM

## 2024-10-07 RX ORDER — LORAZEPAM 2 MG/ML
1 INJECTION INTRAMUSCULAR ONCE
Status: COMPLETED | OUTPATIENT
Start: 2024-10-07 | End: 2024-10-07

## 2024-10-07 RX ORDER — DOXYCYCLINE 100 MG/1
100 CAPSULE ORAL 2 TIMES DAILY
Qty: 10 CAPSULE | Refills: 0 | Status: ACTIVE | OUTPATIENT
Start: 2024-10-07 | End: 2024-10-12

## 2024-10-07 RX ORDER — OXYCODONE AND ACETAMINOPHEN 5; 325 MG/1; MG/1
1 TABLET ORAL ONCE
Status: COMPLETED | OUTPATIENT
Start: 2024-10-07 | End: 2024-10-07

## 2024-10-07 RX ADMIN — LORAZEPAM 1 MG: 2 INJECTION INTRAMUSCULAR; INTRAVENOUS at 21:52

## 2024-10-07 RX ADMIN — OXYCODONE AND ACETAMINOPHEN 1 TABLET: 5; 325 TABLET ORAL at 21:24

## 2024-10-07 RX ADMIN — LIDOCAINE HYDROCHLORIDE 10 ML: 10 INJECTION, SOLUTION EPIDURAL; INFILTRATION; INTRACAUDAL; PERINEURAL at 21:30

## 2024-10-07 ASSESSMENT — FIBROSIS 4 INDEX: FIB4 SCORE: 0.54

## 2025-05-06 ENCOUNTER — APPOINTMENT (OUTPATIENT)
Dept: RADIOLOGY | Facility: MEDICAL CENTER | Age: 45
DRG: 189 | End: 2025-05-06
Attending: STUDENT IN AN ORGANIZED HEALTH CARE EDUCATION/TRAINING PROGRAM
Payer: MEDICAID

## 2025-05-06 ENCOUNTER — APPOINTMENT (OUTPATIENT)
Dept: RADIOLOGY | Facility: MEDICAL CENTER | Age: 45
DRG: 189 | End: 2025-05-06
Attending: INTERNAL MEDICINE
Payer: MEDICAID

## 2025-05-06 ENCOUNTER — HOSPITAL ENCOUNTER (INPATIENT)
Facility: MEDICAL CENTER | Age: 45
LOS: 2 days | DRG: 189 | End: 2025-05-09
Attending: STUDENT IN AN ORGANIZED HEALTH CARE EDUCATION/TRAINING PROGRAM | Admitting: INTERNAL MEDICINE
Payer: MEDICAID

## 2025-05-06 ENCOUNTER — APPOINTMENT (OUTPATIENT)
Dept: CARDIOLOGY | Facility: MEDICAL CENTER | Age: 45
DRG: 189 | End: 2025-05-06
Attending: INTERNAL MEDICINE
Payer: MEDICAID

## 2025-05-06 DIAGNOSIS — F15.10 METHAMPHETAMINE ABUSE (HCC): ICD-10-CM

## 2025-05-06 DIAGNOSIS — R79.89 ELEVATED BRAIN NATRIURETIC PEPTIDE (BNP) LEVEL: ICD-10-CM

## 2025-05-06 DIAGNOSIS — T78.3XXA ANGIOEDEMA, INITIAL ENCOUNTER: ICD-10-CM

## 2025-05-06 DIAGNOSIS — I50.22 CHRONIC SYSTOLIC HEART FAILURE (HCC): ICD-10-CM

## 2025-05-06 DIAGNOSIS — I16.0 HYPERTENSIVE URGENCY: ICD-10-CM

## 2025-05-06 PROBLEM — J96.01 ACUTE HYPOXIC RESPIRATORY FAILURE (HCC): Status: ACTIVE | Noted: 2025-05-06

## 2025-05-06 PROBLEM — R65.10 SIRS (SYSTEMIC INFLAMMATORY RESPONSE SYNDROME) (HCC): Status: ACTIVE | Noted: 2025-05-06

## 2025-05-06 PROBLEM — G93.41 METABOLIC ENCEPHALOPATHY: Status: ACTIVE | Noted: 2025-05-06

## 2025-05-06 LAB
ALBUMIN SERPL BCP-MCNC: 3.4 G/DL (ref 3.2–4.9)
ALBUMIN/GLOB SERPL: 1.4 G/DL
ALP SERPL-CCNC: 80 U/L (ref 30–99)
ALT SERPL-CCNC: 81 U/L (ref 2–50)
AMPHET UR QL SCN: POSITIVE
ANION GAP SERPL CALC-SCNC: 9 MMOL/L (ref 7–16)
APPEARANCE UR: CLEAR
AST SERPL-CCNC: 63 U/L (ref 12–45)
B PARAP IS1001 DNA NPH QL NAA+NON-PROBE: NOT DETECTED
B PERT.PT PRMT NPH QL NAA+NON-PROBE: NOT DETECTED
BACTERIA #/AREA URNS HPF: NORMAL /HPF
BARBITURATES UR QL SCN: NEGATIVE
BASOPHILS # BLD AUTO: 0.4 % (ref 0–1.8)
BASOPHILS # BLD: 0.05 K/UL (ref 0–0.12)
BENZODIAZ UR QL SCN: NEGATIVE
BILIRUB SERPL-MCNC: 0.8 MG/DL (ref 0.1–1.5)
BILIRUB UR QL STRIP.AUTO: NEGATIVE
BUN SERPL-MCNC: 16 MG/DL (ref 8–22)
BZE UR QL SCN: NEGATIVE
C PNEUM DNA NPH QL NAA+NON-PROBE: NOT DETECTED
CALCIUM ALBUM COR SERPL-MCNC: 9.2 MG/DL (ref 8.5–10.5)
CALCIUM SERPL-MCNC: 8.7 MG/DL (ref 8.5–10.5)
CANNABINOIDS UR QL SCN: POSITIVE
CASTS URNS QL MICRO: NORMAL /LPF (ref 0–2)
CHLORIDE SERPL-SCNC: 101 MMOL/L (ref 96–112)
CO2 SERPL-SCNC: 28 MMOL/L (ref 20–33)
COLOR UR: YELLOW
CREAT SERPL-MCNC: 1.26 MG/DL (ref 0.5–1.4)
EKG IMPRESSION: NORMAL
EOSINOPHIL # BLD AUTO: 0.08 K/UL (ref 0–0.51)
EOSINOPHIL NFR BLD: 0.6 % (ref 0–6.9)
EPITHELIAL CELLS 1715: NORMAL /HPF (ref 0–5)
ERYTHROCYTE [DISTWIDTH] IN BLOOD BY AUTOMATED COUNT: 46.5 FL (ref 35.9–50)
FENTANYL UR QL: NEGATIVE
FLUAV RNA NPH QL NAA+NON-PROBE: NOT DETECTED
FLUAV RNA SPEC QL NAA+PROBE: NEGATIVE
FLUBV RNA NPH QL NAA+NON-PROBE: NOT DETECTED
FLUBV RNA SPEC QL NAA+PROBE: NEGATIVE
GFR SERPLBLD CREATININE-BSD FMLA CKD-EPI: 71 ML/MIN/1.73 M 2
GLOBULIN SER CALC-MCNC: 2.5 G/DL (ref 1.9–3.5)
GLUCOSE SERPL-MCNC: 116 MG/DL (ref 65–99)
GLUCOSE UR STRIP.AUTO-MCNC: NEGATIVE MG/DL
HADV DNA NPH QL NAA+NON-PROBE: NOT DETECTED
HCOV 229E RNA NPH QL NAA+NON-PROBE: NOT DETECTED
HCOV HKU1 RNA NPH QL NAA+NON-PROBE: NOT DETECTED
HCOV NL63 RNA NPH QL NAA+NON-PROBE: DETECTED
HCOV OC43 RNA NPH QL NAA+NON-PROBE: NOT DETECTED
HCT VFR BLD AUTO: 48.1 % (ref 42–52)
HGB BLD-MCNC: 16.2 G/DL (ref 14–18)
HMPV RNA NPH QL NAA+NON-PROBE: NOT DETECTED
HPIV1 RNA NPH QL NAA+NON-PROBE: NOT DETECTED
HPIV2 RNA NPH QL NAA+NON-PROBE: NOT DETECTED
HPIV3 RNA NPH QL NAA+NON-PROBE: NOT DETECTED
HPIV4 RNA NPH QL NAA+NON-PROBE: NOT DETECTED
IMM GRANULOCYTES # BLD AUTO: 0.06 K/UL (ref 0–0.11)
IMM GRANULOCYTES NFR BLD AUTO: 0.5 % (ref 0–0.9)
KETONES UR STRIP.AUTO-MCNC: NEGATIVE MG/DL
LACTATE SERPL-SCNC: 1.4 MMOL/L (ref 0.5–2)
LACTATE SERPL-SCNC: 1.8 MMOL/L (ref 0.5–2)
LEUKOCYTE ESTERASE UR QL STRIP.AUTO: NEGATIVE
LV EJECT FRACT  99904: 45
LV EJECT FRACT MOD 2C 99903: 40
LV EJECT FRACT MOD 4C 99902: 45.53
LV EJECT FRACT MOD BP 99901: 41.71
LYMPHOCYTES # BLD AUTO: 1.12 K/UL (ref 1–4.8)
LYMPHOCYTES NFR BLD: 8.4 % (ref 22–41)
M PNEUMO DNA NPH QL NAA+NON-PROBE: NOT DETECTED
MCH RBC QN AUTO: 29.8 PG (ref 27–33)
MCHC RBC AUTO-ENTMCNC: 33.7 G/DL (ref 32.3–36.5)
MCV RBC AUTO: 88.6 FL (ref 81.4–97.8)
METHADONE UR QL SCN: NEGATIVE
MICRO URNS: ABNORMAL
MONOCYTES # BLD AUTO: 1.13 K/UL (ref 0–0.85)
MONOCYTES NFR BLD AUTO: 8.5 % (ref 0–13.4)
NEUTROPHILS # BLD AUTO: 10.87 K/UL (ref 1.82–7.42)
NEUTROPHILS NFR BLD: 81.6 % (ref 44–72)
NITRITE UR QL STRIP.AUTO: NEGATIVE
NRBC # BLD AUTO: 0 K/UL
NRBC BLD-RTO: 0 /100 WBC (ref 0–0.2)
NT-PROBNP SERPL IA-MCNC: 3131 PG/ML (ref 0–125)
OPIATES UR QL SCN: NEGATIVE
OXYCODONE UR QL SCN: NEGATIVE
PCP UR QL SCN: NEGATIVE
PH UR STRIP.AUTO: 7 [PH] (ref 5–8)
PLATELET # BLD AUTO: 340 K/UL (ref 164–446)
PMV BLD AUTO: 9.1 FL (ref 9–12.9)
POTASSIUM SERPL-SCNC: 3.7 MMOL/L (ref 3.6–5.5)
PROCALCITONIN SERPL-MCNC: 0.12 NG/ML
PROPOXYPH UR QL SCN: NEGATIVE
PROT SERPL-MCNC: 5.9 G/DL (ref 6–8.2)
PROT UR QL STRIP: 30 MG/DL
RBC # BLD AUTO: 5.43 M/UL (ref 4.7–6.1)
RBC # URNS HPF: NORMAL /HPF (ref 0–2)
RBC UR QL AUTO: NEGATIVE
RSV RNA NPH QL NAA+NON-PROBE: NOT DETECTED
RSV RNA SPEC QL NAA+PROBE: NEGATIVE
RV+EV RNA NPH QL NAA+NON-PROBE: NOT DETECTED
SARS-COV-2 RNA NPH QL NAA+NON-PROBE: NOTDETECTED
SARS-COV-2 RNA RESP QL NAA+PROBE: NOTDETECTED
SODIUM SERPL-SCNC: 138 MMOL/L (ref 135–145)
SP GR UR STRIP.AUTO: 1.01
TROPONIN T SERPL-MCNC: 36 NG/L (ref 6–19)
TROPONIN T SERPL-MCNC: 40 NG/L (ref 6–19)
UROBILINOGEN UR STRIP.AUTO-MCNC: 0.2 EU/DL
WBC # BLD AUTO: 13.3 K/UL (ref 4.8–10.8)
WBC #/AREA URNS HPF: NORMAL /HPF

## 2025-05-06 PROCEDURE — 700111 HCHG RX REV CODE 636 W/ 250 OVERRIDE (IP): Mod: JZ,UD | Performed by: STUDENT IN AN ORGANIZED HEALTH CARE EDUCATION/TRAINING PROGRAM

## 2025-05-06 PROCEDURE — A9270 NON-COVERED ITEM OR SERVICE: HCPCS | Mod: UD | Performed by: INTERNAL MEDICINE

## 2025-05-06 PROCEDURE — 700102 HCHG RX REV CODE 250 W/ 637 OVERRIDE(OP): Mod: UD | Performed by: STUDENT IN AN ORGANIZED HEALTH CARE EDUCATION/TRAINING PROGRAM

## 2025-05-06 PROCEDURE — 96375 TX/PRO/DX INJ NEW DRUG ADDON: CPT

## 2025-05-06 PROCEDURE — 93005 ELECTROCARDIOGRAM TRACING: CPT | Mod: TC | Performed by: STUDENT IN AN ORGANIZED HEALTH CARE EDUCATION/TRAINING PROGRAM

## 2025-05-06 PROCEDURE — 96375 TX/PRO/DX INJ NEW DRUG ADDON: CPT | Mod: XU

## 2025-05-06 PROCEDURE — 700102 HCHG RX REV CODE 250 W/ 637 OVERRIDE(OP): Mod: UD | Performed by: INTERNAL MEDICINE

## 2025-05-06 PROCEDURE — 71045 X-RAY EXAM CHEST 1 VIEW: CPT

## 2025-05-06 PROCEDURE — 700105 HCHG RX REV CODE 258: Mod: UD | Performed by: INTERNAL MEDICINE

## 2025-05-06 PROCEDURE — 36415 COLL VENOUS BLD VENIPUNCTURE: CPT

## 2025-05-06 PROCEDURE — 96376 TX/PRO/DX INJ SAME DRUG ADON: CPT | Mod: XU

## 2025-05-06 PROCEDURE — 71275 CT ANGIOGRAPHY CHEST: CPT

## 2025-05-06 PROCEDURE — G0378 HOSPITAL OBSERVATION PER HR: HCPCS

## 2025-05-06 PROCEDURE — 700101 HCHG RX REV CODE 250: Mod: UD

## 2025-05-06 PROCEDURE — 84145 PROCALCITONIN (PCT): CPT

## 2025-05-06 PROCEDURE — 700101 HCHG RX REV CODE 250: Mod: UD | Performed by: STUDENT IN AN ORGANIZED HEALTH CARE EDUCATION/TRAINING PROGRAM

## 2025-05-06 PROCEDURE — 99285 EMERGENCY DEPT VISIT HI MDM: CPT

## 2025-05-06 PROCEDURE — 99223 1ST HOSP IP/OBS HIGH 75: CPT | Performed by: INTERNAL MEDICINE

## 2025-05-06 PROCEDURE — 0241U HCHG SARS-COV-2 COVID-19 NFCT DS RESP RNA 4 TRGT ED POC: CPT

## 2025-05-06 PROCEDURE — 700117 HCHG RX CONTRAST REV CODE 255: Mod: UD | Performed by: INTERNAL MEDICINE

## 2025-05-06 PROCEDURE — 81001 URINALYSIS AUTO W/SCOPE: CPT | Mod: XU

## 2025-05-06 PROCEDURE — 700105 HCHG RX REV CODE 258: Mod: UD | Performed by: STUDENT IN AN ORGANIZED HEALTH CARE EDUCATION/TRAINING PROGRAM

## 2025-05-06 PROCEDURE — 83605 ASSAY OF LACTIC ACID: CPT

## 2025-05-06 PROCEDURE — 700111 HCHG RX REV CODE 636 W/ 250 OVERRIDE (IP): Mod: JZ,UD | Performed by: INTERNAL MEDICINE

## 2025-05-06 PROCEDURE — 700111 HCHG RX REV CODE 636 W/ 250 OVERRIDE (IP): Mod: JZ | Performed by: NURSE PRACTITIONER

## 2025-05-06 PROCEDURE — 85025 COMPLETE CBC W/AUTO DIFF WBC: CPT

## 2025-05-06 PROCEDURE — 83880 ASSAY OF NATRIURETIC PEPTIDE: CPT

## 2025-05-06 PROCEDURE — A9270 NON-COVERED ITEM OR SERVICE: HCPCS | Mod: UD | Performed by: STUDENT IN AN ORGANIZED HEALTH CARE EDUCATION/TRAINING PROGRAM

## 2025-05-06 PROCEDURE — 84484 ASSAY OF TROPONIN QUANT: CPT

## 2025-05-06 PROCEDURE — 94640 AIRWAY INHALATION TREATMENT: CPT

## 2025-05-06 PROCEDURE — 93005 ELECTROCARDIOGRAM TRACING: CPT | Mod: TC

## 2025-05-06 PROCEDURE — 80307 DRUG TEST PRSMV CHEM ANLYZR: CPT

## 2025-05-06 PROCEDURE — A9270 NON-COVERED ITEM OR SERVICE: HCPCS | Mod: UD | Performed by: NURSE PRACTITIONER

## 2025-05-06 PROCEDURE — 93306 TTE W/DOPPLER COMPLETE: CPT

## 2025-05-06 PROCEDURE — 0202U NFCT DS 22 TRGT SARS-COV-2: CPT

## 2025-05-06 PROCEDURE — 93306 TTE W/DOPPLER COMPLETE: CPT | Mod: 26 | Performed by: INTERNAL MEDICINE

## 2025-05-06 PROCEDURE — 700102 HCHG RX REV CODE 250 W/ 637 OVERRIDE(OP): Mod: UD | Performed by: NURSE PRACTITIONER

## 2025-05-06 PROCEDURE — 96374 THER/PROPH/DIAG INJ IV PUSH: CPT | Mod: XU

## 2025-05-06 PROCEDURE — 80053 COMPREHEN METABOLIC PANEL: CPT

## 2025-05-06 PROCEDURE — 700117 HCHG RX CONTRAST REV CODE 255: Mod: UD | Performed by: STUDENT IN AN ORGANIZED HEALTH CARE EDUCATION/TRAINING PROGRAM

## 2025-05-06 RX ORDER — ONDANSETRON 4 MG/1
4 TABLET, ORALLY DISINTEGRATING ORAL EVERY 4 HOURS PRN
Status: DISCONTINUED | OUTPATIENT
Start: 2025-05-06 | End: 2025-05-09 | Stop reason: HOSPADM

## 2025-05-06 RX ORDER — OXYCODONE HYDROCHLORIDE 5 MG/1
2.5 TABLET ORAL
Refills: 0 | Status: DISCONTINUED | OUTPATIENT
Start: 2025-05-06 | End: 2025-05-06

## 2025-05-06 RX ORDER — FUROSEMIDE 10 MG/ML
40 INJECTION INTRAMUSCULAR; INTRAVENOUS ONCE
Status: COMPLETED | OUTPATIENT
Start: 2025-05-06 | End: 2025-05-06

## 2025-05-06 RX ORDER — HYDROMORPHONE HYDROCHLORIDE 1 MG/ML
0.25 INJECTION, SOLUTION INTRAMUSCULAR; INTRAVENOUS; SUBCUTANEOUS
Status: DISCONTINUED | OUTPATIENT
Start: 2025-05-06 | End: 2025-05-06

## 2025-05-06 RX ORDER — ACETAMINOPHEN 325 MG/1
650 TABLET ORAL EVERY 6 HOURS PRN
Status: DISCONTINUED | OUTPATIENT
Start: 2025-05-06 | End: 2025-05-09 | Stop reason: HOSPADM

## 2025-05-06 RX ORDER — ZINC SULFATE 50(220)MG
220 CAPSULE ORAL DAILY
Status: DISCONTINUED | OUTPATIENT
Start: 2025-05-07 | End: 2025-05-09 | Stop reason: HOSPADM

## 2025-05-06 RX ORDER — KETOROLAC TROMETHAMINE 15 MG/ML
15 INJECTION, SOLUTION INTRAMUSCULAR; INTRAVENOUS ONCE
Status: COMPLETED | OUTPATIENT
Start: 2025-05-06 | End: 2025-05-06

## 2025-05-06 RX ORDER — IPRATROPIUM BROMIDE AND ALBUTEROL SULFATE 2.5; .5 MG/3ML; MG/3ML
3 SOLUTION RESPIRATORY (INHALATION) ONCE
Status: COMPLETED | OUTPATIENT
Start: 2025-05-06 | End: 2025-05-06

## 2025-05-06 RX ORDER — ONDANSETRON 2 MG/ML
4 INJECTION INTRAMUSCULAR; INTRAVENOUS EVERY 4 HOURS PRN
Status: DISCONTINUED | OUTPATIENT
Start: 2025-05-06 | End: 2025-05-09 | Stop reason: HOSPADM

## 2025-05-06 RX ORDER — LOSARTAN POTASSIUM 25 MG/1
25 TABLET ORAL
Status: DISCONTINUED | OUTPATIENT
Start: 2025-05-06 | End: 2025-05-08

## 2025-05-06 RX ORDER — ACETAMINOPHEN 325 MG/1
650 TABLET ORAL ONCE
Status: COMPLETED | OUTPATIENT
Start: 2025-05-06 | End: 2025-05-06

## 2025-05-06 RX ORDER — ASCORBIC ACID 500 MG
500 TABLET ORAL 2 TIMES DAILY
Status: DISCONTINUED | OUTPATIENT
Start: 2025-05-06 | End: 2025-05-09 | Stop reason: HOSPADM

## 2025-05-06 RX ORDER — PROCHLORPERAZINE EDISYLATE 5 MG/ML
5-10 INJECTION INTRAMUSCULAR; INTRAVENOUS EVERY 4 HOURS PRN
Status: DISCONTINUED | OUTPATIENT
Start: 2025-05-06 | End: 2025-05-09 | Stop reason: HOSPADM

## 2025-05-06 RX ORDER — OXYCODONE HYDROCHLORIDE 5 MG/1
5 TABLET ORAL
Refills: 0 | Status: DISCONTINUED | OUTPATIENT
Start: 2025-05-06 | End: 2025-05-06

## 2025-05-06 RX ORDER — ENOXAPARIN SODIUM 100 MG/ML
40 INJECTION SUBCUTANEOUS DAILY
Status: DISCONTINUED | OUTPATIENT
Start: 2025-05-06 | End: 2025-05-09 | Stop reason: HOSPADM

## 2025-05-06 RX ORDER — GUAIFENESIN/DEXTROMETHORPHAN 100-10MG/5
10 SYRUP ORAL EVERY 6 HOURS PRN
Status: DISCONTINUED | OUTPATIENT
Start: 2025-05-06 | End: 2025-05-09 | Stop reason: HOSPADM

## 2025-05-06 RX ORDER — SODIUM CHLORIDE 9 MG/ML
1000 INJECTION, SOLUTION INTRAVENOUS ONCE
Status: COMPLETED | OUTPATIENT
Start: 2025-05-06 | End: 2025-05-06

## 2025-05-06 RX ORDER — PROMETHAZINE HYDROCHLORIDE 25 MG/1
12.5-25 TABLET ORAL EVERY 4 HOURS PRN
Status: DISCONTINUED | OUTPATIENT
Start: 2025-05-06 | End: 2025-05-09 | Stop reason: HOSPADM

## 2025-05-06 RX ORDER — SODIUM CHLORIDE 9 MG/ML
INJECTION, SOLUTION INTRAVENOUS CONTINUOUS
Status: DISCONTINUED | OUTPATIENT
Start: 2025-05-06 | End: 2025-05-06

## 2025-05-06 RX ORDER — PROMETHAZINE HYDROCHLORIDE 25 MG/1
12.5-25 SUPPOSITORY RECTAL EVERY 4 HOURS PRN
Status: DISCONTINUED | OUTPATIENT
Start: 2025-05-06 | End: 2025-05-09 | Stop reason: HOSPADM

## 2025-05-06 RX ORDER — KETOROLAC TROMETHAMINE 15 MG/ML
15 INJECTION, SOLUTION INTRAMUSCULAR; INTRAVENOUS EVERY 6 HOURS PRN
Status: COMPLETED | OUTPATIENT
Start: 2025-05-06 | End: 2025-05-08

## 2025-05-06 RX ORDER — HYDRALAZINE HYDROCHLORIDE 20 MG/ML
10 INJECTION INTRAMUSCULAR; INTRAVENOUS EVERY 4 HOURS PRN
Status: DISCONTINUED | OUTPATIENT
Start: 2025-05-06 | End: 2025-05-07

## 2025-05-06 RX ORDER — IPRATROPIUM BROMIDE AND ALBUTEROL SULFATE 2.5; .5 MG/3ML; MG/3ML
SOLUTION RESPIRATORY (INHALATION)
Status: COMPLETED
Start: 2025-05-06 | End: 2025-05-06

## 2025-05-06 RX ADMIN — IPRATROPIUM BROMIDE AND ALBUTEROL SULFATE 3 ML: .5; 2.5 SOLUTION RESPIRATORY (INHALATION) at 09:58

## 2025-05-06 RX ADMIN — HYDRALAZINE HYDROCHLORIDE 10 MG: 20 INJECTION INTRAMUSCULAR; INTRAVENOUS at 16:47

## 2025-05-06 RX ADMIN — OXYCODONE HYDROCHLORIDE AND ACETAMINOPHEN 500 MG: 500 TABLET ORAL at 20:45

## 2025-05-06 RX ADMIN — KETOROLAC TROMETHAMINE 15 MG: 15 INJECTION, SOLUTION INTRAMUSCULAR; INTRAVENOUS at 08:15

## 2025-05-06 RX ADMIN — KETOROLAC TROMETHAMINE 15 MG: 15 INJECTION, SOLUTION INTRAMUSCULAR; INTRAVENOUS at 20:44

## 2025-05-06 RX ADMIN — FUROSEMIDE 40 MG: 10 INJECTION, SOLUTION INTRAVENOUS at 20:45

## 2025-05-06 RX ADMIN — HUMAN ALBUMIN MICROSPHERES AND PERFLUTREN 3 ML: 10; .22 INJECTION, SOLUTION INTRAVENOUS at 17:00

## 2025-05-06 RX ADMIN — OXYCODONE 5 MG: 5 TABLET ORAL at 15:40

## 2025-05-06 RX ADMIN — HYDRALAZINE HYDROCHLORIDE 10 MG: 20 INJECTION INTRAMUSCULAR; INTRAVENOUS at 12:07

## 2025-05-06 RX ADMIN — IPRATROPIUM BROMIDE AND ALBUTEROL SULFATE 3 ML: .5; 2.5 SOLUTION RESPIRATORY (INHALATION) at 17:44

## 2025-05-06 RX ADMIN — SODIUM CHLORIDE: 9 INJECTION, SOLUTION INTRAVENOUS at 16:36

## 2025-05-06 RX ADMIN — SODIUM CHLORIDE 1000 ML: 9 INJECTION, SOLUTION INTRAVENOUS at 08:15

## 2025-05-06 RX ADMIN — IOHEXOL 60 ML: 350 INJECTION, SOLUTION INTRAVENOUS at 09:46

## 2025-05-06 RX ADMIN — ACETAMINOPHEN 650 MG: 325 TABLET ORAL at 08:09

## 2025-05-06 RX ADMIN — GUAIFENESIN SYRUP AND DEXTROMETHORPHAN 10 ML: 100; 10 SYRUP ORAL at 15:40

## 2025-05-06 RX ADMIN — ACETAMINOPHEN 650 MG: 325 TABLET ORAL at 18:44

## 2025-05-06 RX ADMIN — LOSARTAN POTASSIUM 25 MG: 50 TABLET, FILM COATED ORAL at 13:30

## 2025-05-06 SDOH — ECONOMIC STABILITY: TRANSPORTATION INSECURITY
IN THE PAST 12 MONTHS, HAS THE LACK OF TRANSPORTATION KEPT YOU FROM MEDICAL APPOINTMENTS OR FROM GETTING MEDICATIONS?: PATIENT DECLINED

## 2025-05-06 SDOH — ECONOMIC STABILITY: TRANSPORTATION INSECURITY
IN THE PAST 12 MONTHS, HAS LACK OF RELIABLE TRANSPORTATION KEPT YOU FROM MEDICAL APPOINTMENTS, MEETINGS, WORK OR FROM GETTING THINGS NEEDED FOR DAILY LIVING?: PATIENT DECLINED

## 2025-05-06 ASSESSMENT — COGNITIVE AND FUNCTIONAL STATUS - GENERAL
SUGGESTED CMS G CODE MODIFIER DAILY ACTIVITY: CH
MOBILITY SCORE: 24
SUGGESTED CMS G CODE MODIFIER MOBILITY: CH
DAILY ACTIVITIY SCORE: 24

## 2025-05-06 ASSESSMENT — COPD QUESTIONNAIRES
DURING THE PAST 4 WEEKS HOW MUCH DID YOU FEEL SHORT OF BREATH: NONE/LITTLE OF THE TIME
DO YOU EVER COUGH UP ANY MUCUS OR PHLEGM?: NO/ONLY WITH OCCASIONAL COLDS OR INFECTIONS
HAVE YOU SMOKED AT LEAST 100 CIGARETTES IN YOUR ENTIRE LIFE: NO/DON'T KNOW
COPD SCREENING SCORE: 0

## 2025-05-06 ASSESSMENT — SOCIAL DETERMINANTS OF HEALTH (SDOH)
WITHIN THE PAST 12 MONTHS, THE FOOD YOU BOUGHT JUST DIDN'T LAST AND YOU DIDN'T HAVE MONEY TO GET MORE: PATIENT DECLINED
WITHIN THE LAST YEAR, HAVE YOU BEEN HUMILIATED OR EMOTIONALLY ABUSED IN OTHER WAYS BY YOUR PARTNER OR EX-PARTNER?: NO
WITHIN THE LAST YEAR, HAVE YOU BEEN AFRAID OF YOUR PARTNER OR EX-PARTNER?: NO
WITHIN THE PAST 12 MONTHS, YOU WORRIED THAT YOUR FOOD WOULD RUN OUT BEFORE YOU GOT THE MONEY TO BUY MORE: PATIENT DECLINED
IN THE PAST 12 MONTHS, HAS THE ELECTRIC, GAS, OIL, OR WATER COMPANY THREATENED TO SHUT OFF SERVICE IN YOUR HOME?: PATIENT DECLINED
WITHIN THE LAST YEAR, HAVE YOU BEEN KICKED, HIT, SLAPPED, OR OTHERWISE PHYSICALLY HURT BY YOUR PARTNER OR EX-PARTNER?: NO
WITHIN THE LAST YEAR, HAVE TO BEEN RAPED OR FORCED TO HAVE ANY KIND OF SEXUAL ACTIVITY BY YOUR PARTNER OR EX-PARTNER?: NO

## 2025-05-06 ASSESSMENT — LIFESTYLE VARIABLES
DOES PATIENT WANT TO STOP DRINKING: NO
HAVE YOU EVER FELT YOU SHOULD CUT DOWN ON YOUR DRINKING: NO
TOTAL SCORE: 0
ALCOHOL_USE: NO
CONSUMPTION TOTAL: NEGATIVE
TOTAL SCORE: 0
TOTAL SCORE: 0
EVER FELT BAD OR GUILTY ABOUT YOUR DRINKING: NO
AVERAGE NUMBER OF DAYS PER WEEK YOU HAVE A DRINK CONTAINING ALCOHOL: 0
HAVE PEOPLE ANNOYED YOU BY CRITICIZING YOUR DRINKING: NO
HAVE PEOPLE ANNOYED YOU BY CRITICIZING YOUR DRINKING: NO
HAVE YOU EVER FELT YOU SHOULD CUT DOWN ON YOUR DRINKING: NO
ON A TYPICAL DAY WHEN YOU DRINK ALCOHOL HOW MANY DRINKS DO YOU HAVE: 0
TOTAL SCORE: 0
EVER HAD A DRINK FIRST THING IN THE MORNING TO STEADY YOUR NERVES TO GET RID OF A HANGOVER: NO
DO YOU DRINK ALCOHOL: NO
TOTAL SCORE: 0
CONSUMPTION TOTAL: INCOMPLETE
EVER HAD A DRINK FIRST THING IN THE MORNING TO STEADY YOUR NERVES TO GET RID OF A HANGOVER: NO
HOW MANY TIMES IN THE PAST YEAR HAVE YOU HAD 5 OR MORE DRINKS IN A DAY: 0
EVER FELT BAD OR GUILTY ABOUT YOUR DRINKING: NO
TOTAL SCORE: 0

## 2025-05-06 ASSESSMENT — ENCOUNTER SYMPTOMS
SHORTNESS OF BREATH: 1
BACK PAIN: 1
COUGH: 1

## 2025-05-06 ASSESSMENT — PAIN DESCRIPTION - PAIN TYPE
TYPE: ACUTE PAIN
TYPE: ACUTE PAIN
TYPE: OTHER (COMMENT)

## 2025-05-06 ASSESSMENT — PATIENT HEALTH QUESTIONNAIRE - PHQ9
1. LITTLE INTEREST OR PLEASURE IN DOING THINGS: NOT AT ALL
SUM OF ALL RESPONSES TO PHQ9 QUESTIONS 1 AND 2: 0
2. FEELING DOWN, DEPRESSED, IRRITABLE, OR HOPELESS: NOT AT ALL

## 2025-05-06 ASSESSMENT — FIBROSIS 4 INDEX
FIB4 SCORE: .926470588235294118
FIB4 SCORE: 0.63

## 2025-05-06 NOTE — ASSESSMENT & PLAN NOTE
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  Monitor volume status closely

## 2025-05-06 NOTE — ED TRIAGE NOTES
Pt ambulated to triage with   Chief Complaint   Patient presents with    Cough     Productive green sputum with back pain with cough; started 1 wk ago.    Shortness of Breath     Increasing in the last 4 days   EKG completed, COVID/Flu swab collected and POC in progress.    Pt Informed regarding triage process and verbalized understanding to inform triage tech or RN for any changes in condition. Placed in lobby.

## 2025-05-06 NOTE — ASSESSMENT & PLAN NOTE
Likely from polysubstance abuse  Mental state is a 5/8 he does not seem encephalopathic however is very evasive and uncooperative with questioning

## 2025-05-06 NOTE — ASSESSMENT & PLAN NOTE
Possibly secondary to bronchitis versus meth induced cardiomyopathy  Wean supplemental oxygen as tolerated  Positive for coronavirus, non-COVID  Supportive care  Wean from oxygen as tolerated

## 2025-05-06 NOTE — ASSESSMENT & PLAN NOTE
Possibly secondary to bronchitis versus meth induced cardiomyopathy  Wean supplemental oxygen as tolerated

## 2025-05-06 NOTE — ASSESSMENT & PLAN NOTE
Started on Losartan, this has been discontinued due to suspected angioedema though is unclear if this is the source as patient is entirely uncooperative with giving history.  Started on amlodipine

## 2025-05-06 NOTE — ED NOTES
Pt's spo2  86-88% on room air even after breathing tx.  Placed on 2L   Informed erp regarding spo2 and elevated bp.

## 2025-05-06 NOTE — ED NOTES
Transport  here to take pt floor, last bp 183/83  after hydralazine.   All belongings with patient.

## 2025-05-06 NOTE — ED PROVIDER NOTES
CHIEF COMPLAINT  Chief Complaint   Patient presents with    Cough     Productive green sputum with back pain with cough; started 1 wk ago.    Shortness of Breath     Increasing in the last 4 days       LIMITATION TO HISTORY   Select: None    HPI    Harvinder Gabriel is a 44 y.o. male who presents to the Emergency Department evaluation of a cough which is been productive of green sputum with associated back pain for the past week subjective fevers chills.  Patient denies any measurable fevers no abdominal pain nausea vomiting or diarrhea.  Has had increasing shortness of breath over the past 4 days which has been increasing.  OUTSIDE HISTORIAN(S):  Select: None    EXTERNAL RECORDS REVIEWED  Select: Other reviewed multiple prior ED visits he has had several visits and admissions for sepsis secondary to cellulitis.       PAST MEDICAL HISTORY  Past Medical History:   Diagnosis Date    Hypertension      .    SURGICAL HISTORY  History reviewed. No pertinent surgical history.      FAMILY HISTORY  History reviewed. No pertinent family history.       SOCIAL HISTORY  Social History     Socioeconomic History    Marital status: Single     Spouse name: Not on file    Number of children: Not on file    Years of education: Not on file    Highest education level: Not on file   Occupational History    Not on file   Tobacco Use    Smoking status: Never    Smokeless tobacco: Never   Vaping Use    Vaping status: Never Used   Substance and Sexual Activity    Alcohol use: Not Currently    Drug use: Yes     Comment: Pot and meth    Sexual activity: Not on file   Other Topics Concern    Not on file   Social History Narrative    ** Merged History Encounter **          Social Drivers of Health     Financial Resource Strain: High Risk (5/29/2024)    [facility]    Overall Financial Resource Strain (CARDIA)     Difficulty of Paying Living Expenses: Hard   Food Insecurity: Food Insecurity Present (5/29/2024)     Received from [facility]    Hunger Vital Sign     Worried About Running Out of Food in the Last Year: Often true     Ran Out of Food in the Last Year: Often true   Transportation Needs: Unmet Transportation Needs (5/29/2024)    Received from [facility]    PRAPARE - Transportation     Lack of Transportation (Medical): Yes     Lack of Transportation (Non-Medical): Yes   Physical Activity: Inactive (5/29/2024)    Received from [facility]    Exercise Vital Sign     Days of Exercise per Week: 0 days     Minutes of Exercise per Session: 0 min   Stress: Stress Concern Present (5/29/2024)    Received from [facility]    [place] [employer] - Occupational Stress Questionnaire     Feeling of Stress : Rather much   Social Connections: Socially Isolated (5/29/2024)    Received from [facility]    Social Connection and Isolation Panel [NHANES]     Frequency of Communication with Friends and Family: More than three times a week     Frequency of Social Gatherings with Friends and Family: Once a week     Attends Gnosticism Services: Never     Active Member of Clubs or Organizations: No     Attends Club or Organization Meetings: Never     Marital Status: Never    Intimate Partner Violence: Not At Risk (5/29/2024)    Received from [facility]    Humiliation, Afraid, Rape, and Kick questionnaire     Fear of Current or Ex-Partner: No     Emotionally Abused: No     Physically Abused: No     Sexually Abused: No   Housing Stability: High Risk (5/29/2024)    Received from [facility]    Housing Stability Vital Sign     Unable to Pay for Housing in the Last Year: Patient declined     Number of Places Lived in the Last Year: 0     Unstable Housing in the Last Year: Yes         CURRENT MEDICATIONS  No current facility-administered medications on file  "prior to encounter.     No current outpatient medications on file prior to encounter.           ALLERGIES  No Known Allergies    PHYSICAL EXAM  VITAL SIGNS:BP (!) 196/127   Pulse (!) 107   Temp 37.3 °C (99.1 °F) (Oral)   Resp 18   Ht 1.778 m (5' 10\")   Wt 95.3 kg (210 lb)   SpO2 95%   BMI 30.13 kg/m²       VITALS - vital signs documented prior to this note have been reviewed and noted,  GENERAL - awake, alert, oriented, GCS 15,   HEENT - normocephalic, atraumatic, pupils equal, sclera anicteric, mucus  membranes moist  NECK - supple, no meningismus, full active range of motion, trachea midline  CARDIOVASCULAR -tachycardic regular rate/rhythm, no murmurs/gallops/rubs  PULMONARY - no respiratory distress, speaking in full sentences, clear to  auscultation bilaterally, no wheezing/ronchi/rales, no accessory muscle use  GASTROINTESTINAL - soft, non-tender, non-distended, no rebound, guarding,  or peritonitis  GENITOURINARY - Deferred  NEUROLOGIC - Awake alert, normal mental status, speech fluid, cognition  normal, moves all extremities  MUSCULOSKELETAL - no obvious asymmetry or deformities present  EXTREMITIES - warm, well-perfused, no cyanosis or significant edema  DERMATOLOGIC - warm, dry, no rashes, no jaundice  PSYCHIATRIC - normal affect, normal insight, normal concentration    DIAGNOSTIC STUDIES / PROCEDURES  EKG  I have independently interpreted this EKG  Interpreted below by myself as there is a 1 mL of ST elevation in lead V2 no contiguous elevation or ST depression appears unchanged when compared to prior.  Interpretation is sinus rhythm with possible early repolarization pattern.    LABS  Labs Reviewed   CBC WITH DIFFERENTIAL - Abnormal; Notable for the following components:       Result Value    WBC 13.3 (*)     Neutrophils-Polys 81.60 (*)     Lymphocytes 8.40 (*)     Neutrophils (Absolute) 10.87 (*)     Monos (Absolute) 1.13 (*)     All other components within normal limits   COMP METABOLIC PANEL - " Abnormal; Notable for the following components:    Glucose 116 (*)     AST(SGOT) 63 (*)     ALT(SGPT) 81 (*)     Total Protein 5.9 (*)     All other components within normal limits   PROBRAIN NATRIURETIC PEPTIDE, NT - Abnormal; Notable for the following components:    NT-proBNP 3131 (*)     All other components within normal limits   TROPONIN - Abnormal; Notable for the following components:    Troponin T 40 (*)     All other components within normal limits   TROPONIN - Abnormal; Notable for the following components:    Troponin T 36 (*)     All other components within normal limits   URINE DRUG SCREEN - Abnormal; Notable for the following components:    Amphetamines Urine Positive (*)     Cannabinoid Metab Positive (*)     All other components within normal limits   LACTIC ACID   ESTIMATED GFR   POCT COV-2, FLU A/B, RSV BY PCR   POC COV-2, FLU A/B, RSV BY PCR     Troponin elevated though downtrending on recheck may be demand patient's hypoxia does have an elevated BNP elevated amphetamines    RADIOLOGY  I have independently interpreted the diagnostic imaging associated with this visit and am waiting the final reading from the radiologist.   My preliminary interpretation is as follows: Chest x-ray is without pneumonia      Radiologist interpretation:   CT-CTA CHEST PULMONARY ARTERY W/ RECONS   Final Result      1.  No CT evidence for pulmonary emboli.   2.  Small bilateral pleural effusions, larger on the RIGHT.   3.  No pneumonia or pneumothorax.   4.  Subcutaneous edema in the midline anterior chest wall of uncertain etiology and significance.               DX-CHEST-PORTABLE (1 VIEW)   Final Result         1.  No acute cardiopulmonary disease.   2.  Cardiomegaly           COURSE & MEDICAL DECISION MAKING    ED COURSE:        INTERVENTIONS BY ME:  Medications   NS (Bolus) 0.9 % infusion 1,000 mL (1,000 mL Intravenous New Bag 5/6/25 0815)   ketorolac (Toradol) 15 MG/ML injection 15 mg (15 mg Intravenous Given 5/6/25  0815)   acetaminophen (Tylenol) tablet 650 mg (650 mg Oral Given 5/6/25 0809)   ipratropium-albuterol (DUONEB) nebulizer solution (3 mL Nebulization Given 5/6/25 0958)   iohexol (OMNIPAQUE) 350 mg/mL (IV) (60 mL Intravenous Given 5/6/25 0946)       Response on recheck: Patient remains hypoxic after DuoNeb treatment.            INITIAL ASSESSMENT, COURSE AND PLAN  Care Narrative: Patient presented for evaluation of shortness of breath with an associated cough.  Upon arrival in the emergency department the patient was diaphoretic he was hypoxic saturating 87% on room air he was placed on oxygen and given a DuoNeb treatment workup was initiated.  His troponin was elevated out of 40, does appear to have  no acute ischemic changes on the patient's EKG, doubt ACS may be a demand from the patient's hypoxia.  His flu COVID RSV test are negative.  He remained hypoxic and tachycardic as such I did order a CTA to evaluate for occult pneumonia or PE which was negative.  With small bilateral pulmonary effusions and some subcutaneous midline anterior chest edema, no tenderness in the area, doubt this is clinically significant at this time.  His BNP is elevated at 3000 UDS is positive for amphetamines, question whether patient has a component of nonischemic cardiomyopathy in conjunction with a possible viral bronchitis contributing to the patient's acute hypoxic respiratory failure.  Given his persistent oxygen requirement we will plan for admission to the hospital.             ADDITIONAL PROBLEM LIST    DISPOSITION AND DISCUSSIONS  I have discussed management of the patient with the following physicians and ADAM's: Hospitalist    Discussion of management with other [facility] or appropriate source(s): RT          Barriers to care at this time, including but not limited to: Patient does not have established PCP and Patient is homeless.         FINAL DIAGNOSIS  #1 acute hypoxic respiratory failure  2.  Bronchitis  3.  Substance abuse  4.   Hypertension  5.  Leukocytosis       Electronically signed by: Balta Yañez DO ,11:13 AM 05/06/25

## 2025-05-06 NOTE — ED NOTES
Med Rec completed per patient   Allergies reviewed  Dispense history available in EPIC? No    Patient is not taking anticoagulants

## 2025-05-06 NOTE — H&P
Hospital Medicine History & Physical Note    Date of Service  5/6/2025    Primary Care Physician  Pcp Pt States None    Consultants  None    Code Status  Full Code    Chief Complaint  Chief Complaint   Patient presents with    Cough     Productive green sputum with back pain with cough; started 1 wk ago.    Shortness of Breath     Increasing in the last 4 days       History of Presenting Illness  Harvinder Gabriel is a 44 y.o. male with a history of meth abuse, homelessness who presented 5/6/2025 with acute hypoxic respiratory failure.    Patient is very somnolent on presentation.  He does report recent cough, shortness of breath.  Also endorses some acute on chronic back pain.  Complains of lethargy.  History and review of systems is limited by somnolence.  No significant wheezing on exam.    On presentation to the emergency room the patient had hypertensive urgency with systolics in the 190s and was tachycardic, tachypneic.  He was requiring 2 L nasal cannula.  Labs were significant for leukocytosis at 13.3 AST elevated at 63, ALT 81.  Troponins initially elevated at 40 which trended down to 36.  BNP elevated at 3131.  UDS was positive for amphetamines, cannabinoids.  COVID/RSV/influenza negative.  CT pulmonary angiogram showed no pulmonary emboli.  Small bilateral pleural effusions larger on the right.  No pneumonia or pneumothorax.   In the ER patient was given Tylenol, DuoNebs, Toradol and bolused 1 L of normal saline.    I discussed the plan of care with patient and bedside RN. ERP    Review of Systems  Review of Systems   Unable to perform ROS: Mental acuity   Respiratory:  Positive for cough and shortness of breath.    Musculoskeletal:  Positive for back pain.       Past Medical History   has a past medical history of Hypertension.    Surgical History   has no past surgical history on file.     Family History  family history is not on file.   Family history reviewed with patient. There is no family history  that is pertinent to the chief complaint.     Social History   reports that he has never smoked. He has never used smokeless tobacco. He reports that he does not currently use alcohol. He reports current drug use.    Allergies  No Known Allergies    Medications  None       Physical Exam  Temp:  [35.2 °C (95.4 °F)-37.3 °C (99.1 °F)] 37.3 °C (99.1 °F)  Pulse:  [105-124] 107  Resp:  [13-30] 18  BP: (185-203)/(120-131) 196/127  SpO2:  [81 %-98 %] 95 %  Blood Pressure: (!) 196/127   Temperature: 37.3 °C (99.1 °F)   Pulse: (!) 107   Respiration: 18   Pulse Oximetry: 95 %       Physical Exam  Vitals and nursing note reviewed.   Constitutional:       Appearance: Normal appearance. He is obese. He is ill-appearing.      Comments: Red face/complexion   HENT:      Head: Normocephalic and atraumatic.      Nose: Nose normal.      Mouth/Throat:      Mouth: Mucous membranes are moist.      Pharynx: Oropharynx is clear.   Eyes:      Extraocular Movements: Extraocular movements intact.      Conjunctiva/sclera: Conjunctivae normal.   Cardiovascular:      Rate and Rhythm: Regular rhythm. Tachycardia present.      Pulses: Normal pulses.      Heart sounds: Normal heart sounds. No murmur heard.     No friction rub. No gallop.   Pulmonary:      Effort: Pulmonary effort is normal. Tachypnea present. No respiratory distress.      Breath sounds: Normal breath sounds. No wheezing or rales.   Chest:      Chest wall: No tenderness.   Abdominal:      General: Abdomen is flat. Bowel sounds are normal. There is no distension.      Palpations: Abdomen is soft. There is no mass.      Tenderness: There is no abdominal tenderness. There is no guarding.   Musculoskeletal:         General: Normal range of motion.      Cervical back: Normal range of motion and neck supple.   Skin:     General: Skin is warm.      Capillary Refill: Capillary refill takes less than 2 seconds.   Neurological:      General: No focal deficit present.      Mental Status: Mental  status is at baseline. He is lethargic and disoriented.      Cranial Nerves: No cranial nerve deficit.      Motor: No weakness.         Laboratory:  Recent Labs     05/06/25  0759   WBC 13.3*   RBC 5.43   HEMOGLOBIN 16.2   HEMATOCRIT 48.1   MCV 88.6   MCH 29.8   MCHC 33.7   RDW 46.5   PLATELETCT 340   MPV 9.1     Recent Labs     05/06/25  0759   SODIUM 138   POTASSIUM 3.7   CHLORIDE 101   CO2 28   GLUCOSE 116*   BUN 16   CREATININE 1.26   CALCIUM 8.7     Recent Labs     05/06/25  0759   ALTSGPT 81*   ASTSGOT 63*   ALKPHOSPHAT 80   TBILIRUBIN 0.8   GLUCOSE 116*         Recent Labs     05/06/25  0759   NTPROBNP 3131*         Recent Labs     05/06/25  0759 05/06/25  1001   TROPONINT 40* 36*       Imaging:  CT-CTA CHEST PULMONARY ARTERY W/ RECONS   Final Result      1.  No CT evidence for pulmonary emboli.   2.  Small bilateral pleural effusions, larger on the RIGHT.   3.  No pneumonia or pneumothorax.   4.  Subcutaneous edema in the midline anterior chest wall of uncertain etiology and significance.               DX-CHEST-PORTABLE (1 VIEW)   Final Result         1.  No acute cardiopulmonary disease.   2.  Cardiomegaly          X-Ray:  I have personally reviewed the images and compared with prior images. and My impression is: No acute cardiopulmonary disease  EKG:  I have personally reviewed the images and compared with prior images. and My impression is: Sinus tachycardia at 121 bpm.  No ischemic changes.    Assessment/Plan:  Justification for Admission Status  I anticipate this patient is appropriate for observation status at this time because acute hypoxic respiratory failure, hypertensive urgency, methamphetamine abuse, metabolic encephalopathy    Patient will need a Med/Surg bed on EMERGENCY service .  The need is secondary to acute hypoxic respiratory failure, hypertensive urgency, methamphetamine abuse, metabolic encephalopathy.    * Acute respiratory failure with hypoxia (HCC)- (present on admission)  Assessment  & Plan  Possibly secondary to bronchitis versus meth induced cardiomyopathy  Wean supplemental oxygen as tolerated    Metabolic encephalopathy  Assessment & Plan  Likely from polysubstance abuse    Hypertensive urgency  Assessment & Plan  Started on Losartan    SIRS (systemic inflammatory response syndrome) (HCC)  Assessment & Plan  SIRS criteria identified on my evaluation include:  Hypothermia, with temperature less than 96.8 deg F, Tachycardia, with heart rate greater than 90 BPM, and Leukocytosis, with WBC greater than 12,000  SIRS is non-infectious, the patient does not have sepsis    No end organ dysfunction  Work up infectious etiologies      Elevated brain natriuretic peptide (BNP) level  Assessment & Plan  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  Monitor volume status closely    Elevated troponin  Assessment & Plan  40 and trended down to 36.  Denies any chest pain  Likely demand ischemia  Check echo    Elevated LFTs  Assessment & Plan  From hypertensive urgency?  Screen hepatitis in the setting of drug abuse    Methamphetamine abuse (HCC)- (present on admission)  Assessment & Plan  UDS positive for methamphetamines, cannabinoids        VTE prophylaxis: enoxaparin ppx

## 2025-05-06 NOTE — ASSESSMENT & PLAN NOTE
SIRS criteria identified on my evaluation include:  Hypothermia, with temperature less than 96.8 deg F, Tachycardia, with heart rate greater than 90 BPM, and Leukocytosis, with WBC greater than 12,000  SIRS is non-infectious, the patient does not have sepsis    No end organ dysfunction  Work up infectious etiologies

## 2025-05-07 LAB
ALBUMIN SERPL BCP-MCNC: 3.4 G/DL (ref 3.2–4.9)
ALBUMIN/GLOB SERPL: 1.3 G/DL
ALP SERPL-CCNC: 81 U/L (ref 30–99)
ALT SERPL-CCNC: 67 U/L (ref 2–50)
ANION GAP SERPL CALC-SCNC: 10 MMOL/L (ref 7–16)
AST SERPL-CCNC: 51 U/L (ref 12–45)
BILIRUB SERPL-MCNC: 1 MG/DL (ref 0.1–1.5)
BUN SERPL-MCNC: 17 MG/DL (ref 8–22)
CALCIUM ALBUM COR SERPL-MCNC: 8.9 MG/DL (ref 8.5–10.5)
CALCIUM SERPL-MCNC: 8.4 MG/DL (ref 8.5–10.5)
CHLORIDE SERPL-SCNC: 100 MMOL/L (ref 96–112)
CK SERPL-CCNC: 298 U/L (ref 0–154)
CO2 SERPL-SCNC: 26 MMOL/L (ref 20–33)
CREAT SERPL-MCNC: 1.17 MG/DL (ref 0.5–1.4)
ERYTHROCYTE [DISTWIDTH] IN BLOOD BY AUTOMATED COUNT: 47.5 FL (ref 35.9–50)
GFR SERPLBLD CREATININE-BSD FMLA CKD-EPI: 78 ML/MIN/1.73 M 2
GLOBULIN SER CALC-MCNC: 2.6 G/DL (ref 1.9–3.5)
GLUCOSE SERPL-MCNC: 125 MG/DL (ref 65–99)
HAV IGM SERPL QL IA: NORMAL
HBV CORE IGM SER QL: NORMAL
HBV SURFACE AG SER QL: NORMAL
HCT VFR BLD AUTO: 46.3 % (ref 42–52)
HCV AB SER QL: NORMAL
HGB BLD-MCNC: 15.7 G/DL (ref 14–18)
MAGNESIUM SERPL-MCNC: 2.1 MG/DL (ref 1.5–2.5)
MCH RBC QN AUTO: 30.3 PG (ref 27–33)
MCHC RBC AUTO-ENTMCNC: 33.9 G/DL (ref 32.3–36.5)
MCV RBC AUTO: 89.4 FL (ref 81.4–97.8)
PHOSPHATE SERPL-MCNC: 4.1 MG/DL (ref 2.5–4.5)
PLATELET # BLD AUTO: 317 K/UL (ref 164–446)
PMV BLD AUTO: 9 FL (ref 9–12.9)
POTASSIUM SERPL-SCNC: 3.9 MMOL/L (ref 3.6–5.5)
PROT SERPL-MCNC: 6 G/DL (ref 6–8.2)
RBC # BLD AUTO: 5.18 M/UL (ref 4.7–6.1)
SODIUM SERPL-SCNC: 136 MMOL/L (ref 135–145)
WBC # BLD AUTO: 14.3 K/UL (ref 4.8–10.8)

## 2025-05-07 PROCEDURE — 700111 HCHG RX REV CODE 636 W/ 250 OVERRIDE (IP): Mod: JZ | Performed by: NURSE PRACTITIONER

## 2025-05-07 PROCEDURE — 700111 HCHG RX REV CODE 636 W/ 250 OVERRIDE (IP): Performed by: NURSE PRACTITIONER

## 2025-05-07 PROCEDURE — 700102 HCHG RX REV CODE 250 W/ 637 OVERRIDE(OP): Performed by: NURSE PRACTITIONER

## 2025-05-07 PROCEDURE — 85027 COMPLETE CBC AUTOMATED: CPT

## 2025-05-07 PROCEDURE — 770020 HCHG ROOM/CARE - TELE (206)

## 2025-05-07 PROCEDURE — 99233 SBSQ HOSP IP/OBS HIGH 50: CPT | Mod: FS | Performed by: INTERNAL MEDICINE

## 2025-05-07 PROCEDURE — 36415 COLL VENOUS BLD VENIPUNCTURE: CPT

## 2025-05-07 PROCEDURE — 700111 HCHG RX REV CODE 636 W/ 250 OVERRIDE (IP): Mod: JZ | Performed by: INTERNAL MEDICINE

## 2025-05-07 PROCEDURE — 83735 ASSAY OF MAGNESIUM: CPT

## 2025-05-07 PROCEDURE — 80053 COMPREHEN METABOLIC PANEL: CPT

## 2025-05-07 PROCEDURE — 96376 TX/PRO/DX INJ SAME DRUG ADON: CPT

## 2025-05-07 PROCEDURE — 82550 ASSAY OF CK (CPK): CPT

## 2025-05-07 PROCEDURE — 84100 ASSAY OF PHOSPHORUS: CPT

## 2025-05-07 PROCEDURE — 700102 HCHG RX REV CODE 250 W/ 637 OVERRIDE(OP): Performed by: INTERNAL MEDICINE

## 2025-05-07 PROCEDURE — A9270 NON-COVERED ITEM OR SERVICE: HCPCS | Performed by: NURSE PRACTITIONER

## 2025-05-07 PROCEDURE — A9270 NON-COVERED ITEM OR SERVICE: HCPCS | Performed by: INTERNAL MEDICINE

## 2025-05-07 PROCEDURE — 80074 ACUTE HEPATITIS PANEL: CPT

## 2025-05-07 RX ORDER — QUETIAPINE FUMARATE 25 MG/1
25 TABLET, FILM COATED ORAL NIGHTLY PRN
Status: DISCONTINUED | OUTPATIENT
Start: 2025-05-07 | End: 2025-05-09 | Stop reason: HOSPADM

## 2025-05-07 RX ORDER — LORAZEPAM 1 MG/1
1 TABLET ORAL EVERY 6 HOURS PRN
Status: DISCONTINUED | OUTPATIENT
Start: 2025-05-07 | End: 2025-05-09 | Stop reason: HOSPADM

## 2025-05-07 RX ORDER — LABETALOL HYDROCHLORIDE 5 MG/ML
20 INJECTION, SOLUTION INTRAVENOUS EVERY 6 HOURS PRN
Status: DISCONTINUED | OUTPATIENT
Start: 2025-05-07 | End: 2025-05-09 | Stop reason: HOSPADM

## 2025-05-07 RX ORDER — CARVEDILOL 6.25 MG/1
6.25 TABLET ORAL 2 TIMES DAILY WITH MEALS
Status: DISCONTINUED | OUTPATIENT
Start: 2025-05-07 | End: 2025-05-08

## 2025-05-07 RX ORDER — ALBUTEROL SULFATE 90 UG/1
2 INHALANT RESPIRATORY (INHALATION) EVERY 4 HOURS PRN
Status: DISCONTINUED | OUTPATIENT
Start: 2025-05-07 | End: 2025-05-09 | Stop reason: HOSPADM

## 2025-05-07 RX ORDER — ALBUTEROL SULFATE 90 UG/1
2 INHALANT RESPIRATORY (INHALATION)
Status: DISCONTINUED | OUTPATIENT
Start: 2025-05-07 | End: 2025-05-07

## 2025-05-07 RX ORDER — DIAZEPAM 10 MG/2ML
5 INJECTION, SOLUTION INTRAMUSCULAR; INTRAVENOUS
Status: DISCONTINUED | OUTPATIENT
Start: 2025-05-07 | End: 2025-05-09 | Stop reason: HOSPADM

## 2025-05-07 RX ADMIN — HYDRALAZINE HYDROCHLORIDE 10 MG: 20 INJECTION INTRAMUSCULAR; INTRAVENOUS at 04:17

## 2025-05-07 RX ADMIN — QUETIAPINE FUMARATE 25 MG: 25 TABLET ORAL at 21:50

## 2025-05-07 RX ADMIN — DIAZEPAM 5 MG: 10 INJECTION, SOLUTION INTRAMUSCULAR; INTRAVENOUS at 19:16

## 2025-05-07 RX ADMIN — HYDRALAZINE HYDROCHLORIDE 10 MG: 20 INJECTION INTRAMUSCULAR; INTRAVENOUS at 19:42

## 2025-05-07 RX ADMIN — CARVEDILOL 6.25 MG: 6.25 TABLET, FILM COATED ORAL at 08:46

## 2025-05-07 RX ADMIN — ACETAMINOPHEN 650 MG: 325 TABLET ORAL at 08:45

## 2025-05-07 RX ADMIN — LOSARTAN POTASSIUM 25 MG: 50 TABLET, FILM COATED ORAL at 05:16

## 2025-05-07 RX ADMIN — ZINC SULFATE 220 MG (50 MG) CAPSULE 220 MG: CAPSULE at 05:16

## 2025-05-07 RX ADMIN — LORAZEPAM 1 MG: 1 TABLET ORAL at 09:35

## 2025-05-07 RX ADMIN — KETOROLAC TROMETHAMINE 15 MG: 15 INJECTION, SOLUTION INTRAMUSCULAR; INTRAVENOUS at 21:09

## 2025-05-07 RX ADMIN — OXYCODONE HYDROCHLORIDE AND ACETAMINOPHEN 500 MG: 500 TABLET ORAL at 17:53

## 2025-05-07 RX ADMIN — LORAZEPAM 1 MG: 1 TABLET ORAL at 17:53

## 2025-05-07 RX ADMIN — KETOROLAC TROMETHAMINE 15 MG: 15 INJECTION, SOLUTION INTRAMUSCULAR; INTRAVENOUS at 03:24

## 2025-05-07 RX ADMIN — OXYCODONE HYDROCHLORIDE AND ACETAMINOPHEN 500 MG: 500 TABLET ORAL at 05:16

## 2025-05-07 RX ADMIN — CARVEDILOL 6.25 MG: 6.25 TABLET, FILM COATED ORAL at 17:53

## 2025-05-07 ASSESSMENT — ENCOUNTER SYMPTOMS
CARDIOVASCULAR NEGATIVE: 1
SHORTNESS OF BREATH: 1
SPUTUM PRODUCTION: 1
NEUROLOGICAL NEGATIVE: 1
GASTROINTESTINAL NEGATIVE: 1
COUGH: 1
MYALGIAS: 1
FEVER: 0
DEPRESSION: 1
NERVOUS/ANXIOUS: 1
CHILLS: 0
EYES NEGATIVE: 1
WHEEZING: 1

## 2025-05-07 ASSESSMENT — PAIN DESCRIPTION - PAIN TYPE
TYPE: ACUTE PAIN
TYPE: ACUTE PAIN;CHRONIC PAIN
TYPE: ACUTE PAIN

## 2025-05-07 ASSESSMENT — LIFESTYLE VARIABLES: SUBSTANCE_ABUSE: 1

## 2025-05-07 ASSESSMENT — FIBROSIS 4 INDEX: FIB4 SCORE: 0.88

## 2025-05-07 NOTE — PROGRESS NOTES
Monitor Summary:  Rhythm: ST  Rate: 109-120  Ectopy: rare PACs, PVCs    0.14/0.08/0.31    Per monitor tech interpretation

## 2025-05-07 NOTE — PROGRESS NOTES
4 Eyes Skin Assessment Completed by ARNOLD Hector and ARNOLD Dai.    Head WDL  Ears Redness and Blanching  Nose WDL  Mouth WDL  Neck WDL  Breast/Chest WDL  Shoulder Blades WDL  Spine WDL  (R) Arm/Elbow/Hand Scab  (L) Arm/Elbow/Hand Scab  Abdomen WDL  Groin WDL  Scrotum/Coccyx/Buttocks Redness, Discoloration, and Scab  (R) Leg Scab  (L) Leg Scab  (R) Heel/Foot/Toe Redness and Blanching  (L) Heel/Foot/Toe Redness and Blanching          Devices In Places Tele Box and Nasal Cannula      Interventions In Place Gray Ear Foams, NC W/Ear Foams, Pillows, Q2 Turns, Dri-Mando Pads, and Heels Loaded W/Pillows    Possible Skin Injury Yes    Pictures Uploaded Into Epic Yes  Wound Consult Placed Yes  RN Wound Prevention Protocol Ordered Yes

## 2025-05-07 NOTE — HOSPITAL COURSE
Mr. Harvinder Gabriel is a 44 y.o. male with a history of meth abuse, homelessness who presented 5/6/2025 with acute hypoxic respiratory failure.     Patient is very somnolent on presentation.  He does report recent cough, shortness of breath.  Also endorses some acute on chronic back pain.  Complains of lethargy.  History and review of systems is limited by somnolence.  No significant wheezing on exam.     On presentation to the emergency room the patient had hypertensive urgency with systolics in the 190s and was tachycardic, tachypneic.  He was requiring 2 L nasal cannula.  Labs were significant for leukocytosis at 13.3 AST elevated at 63, ALT 81.  Troponins initially elevated at 40 which trended down to 36.  BNP elevated at 3131.  UDS was positive for amphetamines, cannabinoids.  COVID/RSV/influenza negative. CT pulmonary angiogram showed no pulmonary emboli.  Small bilateral pleural effusions larger on the right.  No pneumonia or pneumothorax.   In the ER patient was given Tylenol, DuoNebs, Toradol and bolused 1 L of normal saline.  Patient admitted to hospital medicine for management of care.    During this hospitalization, patient noted to be somewhat verbally aggressive, agitated.  Patient does endorse he uses methamphetamine along with cannabis.  Patient also endorsed using alcohol.  Discussed with patient monitoring oxygen level due to noted acute hypoxic respiratory failure likely exacerbated by coronavirus.    Patient counseled and educated in regards to methamphetamine use cessation along with alcohol and tobacco use cessation.  At this time, patient is not wanting to quit illicit drug use.

## 2025-05-07 NOTE — CARE PLAN
The patient is Watcher - Medium risk of patient condition declining or worsening    Shift Goals  Clinical Goals: monitor VS  Patient Goals: pain control, sleep, eat  Family Goals: RODNEY    Progress made toward(s) clinical / shift goals:    Problem: Knowledge Deficit - Standard  Goal: Patient and family/care givers will demonstrate understanding of plan of care, disease process/condition, diagnostic tests and medications  Description: Target End Date:  1-3 days or as soon as patient condition allowsDocument in Patient Education1.  Patient and family/caregiver oriented to unit, equipment, visitation policy and means for communicating concern2.  Complete/review Learning Assessment3.  Assess knowledge level of disease process/condition, treatment plan, diagnostic tests and medications4.  Explain disease process/condition, treatment plan, diagnostic tests and medications  Outcome: Not Progressing     Problem: Pain - Standard  Goal: Alleviation of pain or a reduction in pain to the patient’s comfort goal  Description: Target End Date:  Prior to discharge or change in level of careDocument on Vitals flowsheet1.  Document pain using the appropriate pain scale per order or unit policy2.  Educate and implement non-pharmacologic comfort measures (i.e. relaxation, distraction, massage, cold/heat therapy, etc.)3.  Pain management medications as ordered4.  Reassess pain after pain med administration per policy5.  If opiods administered assess patient's response to pain medication is appropriate per POSS sedation scale6.  Follow pain management plan developed in collaboration with patient and interdisciplinary team (including palliative care or pain specialists if applicable)  Outcome: Not Progressing       Patient is not progressing towards the following goals:      Problem: Knowledge Deficit - Standard  Goal: Patient and family/care givers will demonstrate understanding of plan of care, disease process/condition, diagnostic tests  and medications  Description: Target End Date:  1-3 days or as soon as patient condition allowsDocument in Patient Education1.  Patient and family/caregiver oriented to unit, equipment, visitation policy and means for communicating concern2.  Complete/review Learning Assessment3.  Assess knowledge level of disease process/condition, treatment plan, diagnostic tests and medications4.  Explain disease process/condition, treatment plan, diagnostic tests and medications  Outcome: Not Progressing     Problem: Pain - Standard  Goal: Alleviation of pain or a reduction in pain to the patient’s comfort goal  Description: Target End Date:  Prior to discharge or change in level of careDocument on Vitals flowsheet1.  Document pain using the appropriate pain scale per order or unit policy2.  Educate and implement non-pharmacologic comfort measures (i.e. relaxation, distraction, massage, cold/heat therapy, etc.)3.  Pain management medications as ordered4.  Reassess pain after pain med administration per policy5.  If opiods administered assess patient's response to pain medication is appropriate per POSS sedation scale6.  Follow pain management plan developed in collaboration with patient and interdisciplinary team (including palliative care or pain specialists if applicable)  Outcome: Not Progressing

## 2025-05-07 NOTE — PROGRESS NOTES
Bedside report received from off going RN/tech: Mikey, assumed care of patient.     Fall Risk Score: HIGH RISK  Fall risk interventions in place: Place yellow fall risk ID band on patient, Provide patient/family education based on risk assessment, Educate patient/family to call staff for assistance when getting out of bed, Place fall precaution signage outside patient door, Place patient in room close to nursing station, Utilize bed/chair fall alarm, Notify charge of high risk for huddle, and Bed alarm connected correctly  Bed type: Regular (Danielito Score less than 17 interventions in place)  Patient on cardiac monitor: Yes, ST  IVF/IV medications: Not Applicable   Oxygen: How many liters 1L, Traced the line to wall oxygen, and No oxygen tank in room  Bedside sitter: Not Applicable   Isolation: Isolation precautions in place

## 2025-05-07 NOTE — PROGRESS NOTES
ISOLATION PRECAUTIONS EDUCATION    Educated PATIENT, FAMILY, S.O: patient on isolation for COVID-19.    Educated on reason for isolation, how the infection may be transmitted, and how to help prevent transmission to others. Educated precautions involves staff and visitors wearing PPE, following Standard Precautions and performing meticulous hand hygiene in order to prevent transmission of infection.     Enhanced Droplet Precautions: Educated that Enhanced Droplet Precautions involves staff and visitors wearing a surgical mask when in the patient room.     In addition,  educated that they may leave their room, but prior to exiting the patient room each time, the patient needs to have on a fresh patient gown, a surgical mask must be worn by the patient while out of the patient room, and perform hand hygiene immediately prior to exiting the room.     Patient transport and mobilization on unit  Educated that they may leave their room, but prior to exiting, the patient needs to have on a fresh patient gown, ensure the potentially infectious area is covered, performing appropriate hand hygiene immediately prior to exiting the room.

## 2025-05-07 NOTE — PROGRESS NOTES
4 Eyes Skin Assessment Completed by ARNOLD Cespedes and ARNOLD Barlow.    Head WDL  Ears WDL  Nose WDL  Mouth WDL  Neck WDL  Breast/Chest WDL  Shoulder Blades WDL  Spine Blanching  (R) Arm/Elbow/Hand Redness and Swelling hand/wrist  (L) Arm/Elbow/Hand Redness and Swelling hand/wrist  Abdomen WDL  Groin WDL  Scrotum/Coccyx/Buttocks WDL  (R) Leg WDL  (L) Leg WDL  (R) Heel/Foot/Toe Discoloration  (L) Heel/Foot/Toe Discoloration          Devices In Places Tele Box, Pulse Ox, and Nasal Cannula      Interventions In Place NC W/Ear Foams and Pillows    Possible Skin Injury No    Pictures Uploaded Into Epic N/A  Wound Consult Placed N/A  RN Wound Prevention Protocol Ordered No

## 2025-05-07 NOTE — PROGRESS NOTES
[facility] Medicine Daily Progress Note    Date of Service  5/7/2025    Chief Complaint  Harvinder Gabriel is a 44 y.o. male admitted 5/6/2025 with SOB, respiratory failure    Hospital Course  Mr. Harvinder Gabriel is a 44 y.o. male with a history of meth abuse, homelessness who presented 5/6/2025 with acute hypoxic respiratory failure.     Patient is very somnolent on presentation.  He does report recent cough, shortness of breath.  Also endorses some acute on chronic back pain.  Complains of lethargy.  History and review of systems is limited by somnolence.  No significant wheezing on exam.     On presentation to the emergency room the patient had hypertensive urgency with systolics in the 190s and was tachycardic, tachypneic.  He was requiring 2 L nasal cannula.  Labs were significant for leukocytosis at 13.3 AST elevated at 63, ALT 81.  Troponins initially elevated at 40 which trended down to 36.  BNP elevated at 3131.  UDS was positive for amphetamines, cannabinoids.  COVID/RSV/influenza negative. CT pulmonary angiogram showed no pulmonary emboli.  Small bilateral pleural effusions larger on the right.  No pneumonia or pneumothorax.   In the ER patient was given Tylenol, DuoNebs, Toradol and bolused 1 L of normal saline.  Patient admitted to hospital medicine for management of care.    During this hospitalization, patient noted to be somewhat verbally aggressive, agitated.  Patient does endorse he uses methamphetamine along with cannabis.  Patient also endorsed using alcohol.  Discussed with patient monitoring oxygen level due to noted acute hypoxic respiratory failure likely exacerbated by coronavirus.    Patient counseled and educated in regards to methamphetamine use cessation along with alcohol and tobacco use cessation.  At this time, patient is not wanting to quit illicit drug use.    Interval Problem Update  Patient seen and examined.  Patient still reports mild shortness of breath specifically on  ambulation.  Discussed with patient cessation of methamphetamine, cannabis, tobacco, and alcohol use.  Patient became very agitated and endorses he is not ready to quit illicit drug use at this time.  Discussed with patient continuation in monitoring shortness of breath and acute hypoxic respiratory failure.  Will continue RT protocol for breathing treatment and wean patient off oxygen as indicated.  Plan of care: Monitor for any withdrawal symptoms from methamphetamine use and alcohol use; continue RT protocol, breathing treatment; wean patient off of oxygen as indicated  Disposition: Patient currently inpatient status as he is anticipated to stay 2-3 midnights for management of acute hypoxic respiratory failure and possible withdrawal from methamphetamine or alcohol.  Lab work: Reviewed; expected  VSS at this time    I have discussed this patient's plan of care and discharge plan at IDT rounds today with Case Management, Nursing, Nursing leadership, and other members of the IDT team.    Consultants/Specialty  NONE    Code Status  Full Code    Disposition  The patient is not medically cleared for discharge to home or a post-acute facility.  Anticipate discharge to: home with close outpatient follow-up    I have placed the appropriate orders for post-discharge needs.    Review of Systems  Review of Systems   Constitutional:  Positive for malaise/fatigue. Negative for chills and fever.   HENT: Negative.     Eyes: Negative.    Respiratory:  Positive for cough, sputum production, shortness of breath and wheezing.    Cardiovascular: Negative.    Gastrointestinal: Negative.    Genitourinary: Negative.    Musculoskeletal:  Positive for myalgias.   Skin: Negative.    Neurological: Negative.    Endo/Heme/Allergies: Negative.    Psychiatric/Behavioral:  Positive for depression and substance abuse. The patient is nervous/anxious.         Physical Exam  Temp:  [36.6 °C (97.9 °F)-38.8 °C (101.9 °F)] 37.5 °C (99.5 °F)  Pulse:   [107-123] 114  Resp:  [18-26] 18  BP: (174-192)/() 180/104  SpO2:  [87 %-100 %] 100 %    Physical Exam  Vitals and nursing note reviewed.   Constitutional:       Appearance: He is obese.   HENT:      Head: Normocephalic.      Nose: Nose normal.      Mouth/Throat:      Mouth: Mucous membranes are moist.      Pharynx: Oropharynx is clear.   Eyes:      Pupils: Pupils are equal, round, and reactive to light.   Cardiovascular:      Rate and Rhythm: Regular rhythm. Tachycardia present.      Pulses: Normal pulses.      Heart sounds: Normal heart sounds.   Pulmonary:      Effort: Pulmonary effort is normal.      Breath sounds: Normal breath sounds.   Abdominal:      General: Bowel sounds are normal.      Palpations: Abdomen is soft.   Musculoskeletal:         General: Tenderness present.      Cervical back: Normal range of motion and neck supple.   Skin:     General: Skin is dry.      Capillary Refill: Capillary refill takes 2 to 3 seconds.   Neurological:      Mental Status: He is alert. Mental status is at baseline.      Motor: Weakness present.         Fluids    Intake/Output Summary (Last 24 hours) at 5/7/2025 1321  Last data filed at 5/7/2025 1313  Gross per 24 hour   Intake --   Output 4575 ml   Net -4575 ml        Laboratory  Recent Labs     05/06/25  0759 05/07/25  0217   WBC 13.3* 14.3*   RBC 5.43 5.18   HEMOGLOBIN 16.2 15.7   HEMATOCRIT 48.1 46.3   MCV 88.6 89.4   MCH 29.8 30.3   MCHC 33.7 33.9   RDW 46.5 47.5   PLATELETCT 340 317   MPV 9.1 9.0     Recent Labs     05/06/25  0759 05/07/25  0217   SODIUM 138 136   POTASSIUM 3.7 3.9   CHLORIDE 101 100   CO2 28 26   GLUCOSE 116* 125*   BUN 16 17   CREATININE 1.26 1.17   CALCIUM 8.7 8.4*                   Imaging  EC-ECHOCARDIOGRAM COMPLETE W/ CONT   Final Result      CT-CTA CHEST PULMONARY ARTERY W/ RECONS   Final Result      1.  No CT evidence for pulmonary emboli.   2.  Small bilateral pleural effusions, larger on the RIGHT.   3.  No pneumonia or pneumothorax.    4.  Subcutaneous edema in the midline anterior chest wall of uncertain etiology and significance.               DX-CHEST-PORTABLE (1 VIEW)   Final Result         1.  No acute cardiopulmonary disease.   2.  Cardiomegaly           Assessment/Plan  * Acute respiratory failure with hypoxia (HCC)- (present on admission)  Assessment & Plan  Possibly secondary to bronchitis versus meth induced cardiomyopathy  Wean supplemental oxygen as tolerated    Metabolic encephalopathy  Assessment & Plan  Likely from polysubstance abuse    Hypertensive urgency  Assessment & Plan  Started on Losartan    SIRS (systemic inflammatory response syndrome) (HCC)  Assessment & Plan  SIRS criteria identified on my evaluation include:  Hypothermia, with temperature less than 96.8 deg F, Tachycardia, with heart rate greater than 90 BPM, and Leukocytosis, with WBC greater than 12,000  SIRS is non-infectious, the patient does not have sepsis    No end organ dysfunction  Work up infectious etiologies      Elevated brain natriuretic peptide (BNP) level  Assessment & Plan  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  Monitor volume status closely    Elevated troponin  Assessment & Plan  40 and trended down to 36.  Denies any chest pain  Likely demand ischemia  Check echo    Elevated LFTs  Assessment & Plan  From hypertensive urgency?  Screen hepatitis in the setting of drug abuse    Methamphetamine abuse (HCC)- (present on admission)  Assessment & Plan  UDS positive for methamphetamines, cannabinoids         VTE prophylaxis:    enoxaparin ppx      I have performed a physical exam and reviewed and updated ROS and Plan today (5/7/2025). In review of yesterday's note (5/6/2025), there are no changes except as documented above.      I, Amarilis Rodriguez DNP performed a substantiated portion of the service face-to-face  with same patient on the same date of service INDEPENDENTLY from the MD on assessment, examination and discussion in plan of care FOR 27 MINUTES.  I was personally involved in reviewing and conducting the medical decision making, including the information as described above.

## 2025-05-07 NOTE — PROGRESS NOTES
Patient transported to T803-2 via bed on 2L nasal cannula with all belongings. Paper chart given to transporter at this time.

## 2025-05-08 PROBLEM — T78.3XXA ANGIOEDEMA: Status: ACTIVE | Noted: 2025-05-08

## 2025-05-08 LAB
ALBUMIN SERPL BCP-MCNC: 3.3 G/DL (ref 3.2–4.9)
ALBUMIN/GLOB SERPL: 1.2 G/DL
ALP SERPL-CCNC: 84 U/L (ref 30–99)
ALT SERPL-CCNC: 53 U/L (ref 2–50)
ANION GAP SERPL CALC-SCNC: 8 MMOL/L (ref 7–16)
AST SERPL-CCNC: 44 U/L (ref 12–45)
BILIRUB SERPL-MCNC: 0.7 MG/DL (ref 0.1–1.5)
BUN SERPL-MCNC: 18 MG/DL (ref 8–22)
C4 SERPL-MCNC: 36.3 MG/DL (ref 19–52)
CALCIUM ALBUM COR SERPL-MCNC: 9.3 MG/DL (ref 8.5–10.5)
CALCIUM SERPL-MCNC: 8.7 MG/DL (ref 8.5–10.5)
CHLORIDE SERPL-SCNC: 98 MMOL/L (ref 96–112)
CO2 SERPL-SCNC: 26 MMOL/L (ref 20–33)
CREAT SERPL-MCNC: 1.01 MG/DL (ref 0.5–1.4)
CRP SERPL HS-MCNC: 6.14 MG/DL (ref 0–0.75)
ERYTHROCYTE [DISTWIDTH] IN BLOOD BY AUTOMATED COUNT: 48.5 FL (ref 35.9–50)
ERYTHROCYTE [SEDIMENTATION RATE] IN BLOOD BY WESTERGREN METHOD: 4 MM/HOUR (ref 0–20)
GFR SERPLBLD CREATININE-BSD FMLA CKD-EPI: 93 ML/MIN/1.73 M 2
GLOBULIN SER CALC-MCNC: 2.8 G/DL (ref 1.9–3.5)
GLUCOSE SERPL-MCNC: 152 MG/DL (ref 65–99)
HCT VFR BLD AUTO: 48.8 % (ref 42–52)
HGB BLD-MCNC: 15.6 G/DL (ref 14–18)
MCH RBC QN AUTO: 29 PG (ref 27–33)
MCHC RBC AUTO-ENTMCNC: 32 G/DL (ref 32.3–36.5)
MCV RBC AUTO: 90.7 FL (ref 81.4–97.8)
PLATELET # BLD AUTO: 281 K/UL (ref 164–446)
PMV BLD AUTO: 9.3 FL (ref 9–12.9)
POTASSIUM SERPL-SCNC: 4 MMOL/L (ref 3.6–5.5)
PROT SERPL-MCNC: 6.1 G/DL (ref 6–8.2)
RBC # BLD AUTO: 5.38 M/UL (ref 4.7–6.1)
SODIUM SERPL-SCNC: 132 MMOL/L (ref 135–145)
WBC # BLD AUTO: 9.7 K/UL (ref 4.8–10.8)

## 2025-05-08 PROCEDURE — 80053 COMPREHEN METABOLIC PANEL: CPT

## 2025-05-08 PROCEDURE — 770020 HCHG ROOM/CARE - TELE (206)

## 2025-05-08 PROCEDURE — 97602 WOUND(S) CARE NON-SELECTIVE: CPT

## 2025-05-08 PROCEDURE — 700102 HCHG RX REV CODE 250 W/ 637 OVERRIDE(OP): Performed by: INTERNAL MEDICINE

## 2025-05-08 PROCEDURE — 85652 RBC SED RATE AUTOMATED: CPT

## 2025-05-08 PROCEDURE — A9270 NON-COVERED ITEM OR SERVICE: HCPCS | Performed by: INTERNAL MEDICINE

## 2025-05-08 PROCEDURE — A9270 NON-COVERED ITEM OR SERVICE: HCPCS | Performed by: NURSE PRACTITIONER

## 2025-05-08 PROCEDURE — 99233 SBSQ HOSP IP/OBS HIGH 50: CPT | Performed by: STUDENT IN AN ORGANIZED HEALTH CARE EDUCATION/TRAINING PROGRAM

## 2025-05-08 PROCEDURE — 85027 COMPLETE CBC AUTOMATED: CPT

## 2025-05-08 PROCEDURE — A9270 NON-COVERED ITEM OR SERVICE: HCPCS | Performed by: STUDENT IN AN ORGANIZED HEALTH CARE EDUCATION/TRAINING PROGRAM

## 2025-05-08 PROCEDURE — 36415 COLL VENOUS BLD VENIPUNCTURE: CPT

## 2025-05-08 PROCEDURE — 700111 HCHG RX REV CODE 636 W/ 250 OVERRIDE (IP): Performed by: NURSE PRACTITIONER

## 2025-05-08 PROCEDURE — 700102 HCHG RX REV CODE 250 W/ 637 OVERRIDE(OP): Performed by: STUDENT IN AN ORGANIZED HEALTH CARE EDUCATION/TRAINING PROGRAM

## 2025-05-08 PROCEDURE — 86160 COMPLEMENT ANTIGEN: CPT

## 2025-05-08 PROCEDURE — 700102 HCHG RX REV CODE 250 W/ 637 OVERRIDE(OP): Performed by: NURSE PRACTITIONER

## 2025-05-08 PROCEDURE — 86140 C-REACTIVE PROTEIN: CPT

## 2025-05-08 PROCEDURE — 700111 HCHG RX REV CODE 636 W/ 250 OVERRIDE (IP): Mod: JZ | Performed by: STUDENT IN AN ORGANIZED HEALTH CARE EDUCATION/TRAINING PROGRAM

## 2025-05-08 RX ORDER — CARVEDILOL 12.5 MG/1
12.5 TABLET ORAL 2 TIMES DAILY WITH MEALS
Status: DISCONTINUED | OUTPATIENT
Start: 2025-05-08 | End: 2025-05-09 | Stop reason: HOSPADM

## 2025-05-08 RX ORDER — DIPHENHYDRAMINE HYDROCHLORIDE 50 MG/ML
25 INJECTION, SOLUTION INTRAMUSCULAR; INTRAVENOUS ONCE
Status: COMPLETED | OUTPATIENT
Start: 2025-05-08 | End: 2025-05-08

## 2025-05-08 RX ORDER — CARVEDILOL 6.25 MG/1
6.25 TABLET ORAL ONCE
Status: DISPENSED | OUTPATIENT
Start: 2025-05-08 | End: 2025-05-09

## 2025-05-08 RX ORDER — DIPHENHYDRAMINE HYDROCHLORIDE 50 MG/ML
25 INJECTION, SOLUTION INTRAMUSCULAR; INTRAVENOUS EVERY 6 HOURS
Status: COMPLETED | OUTPATIENT
Start: 2025-05-08 | End: 2025-05-09

## 2025-05-08 RX ORDER — AMLODIPINE BESYLATE 5 MG/1
5 TABLET ORAL
Status: DISCONTINUED | OUTPATIENT
Start: 2025-05-08 | End: 2025-05-09

## 2025-05-08 RX ORDER — METHYLPREDNISOLONE SODIUM SUCCINATE 125 MG/2ML
62.5 INJECTION, POWDER, LYOPHILIZED, FOR SOLUTION INTRAMUSCULAR; INTRAVENOUS ONCE
Status: COMPLETED | OUTPATIENT
Start: 2025-05-08 | End: 2025-05-08

## 2025-05-08 RX ORDER — LOSARTAN POTASSIUM 25 MG/1
25 TABLET ORAL ONCE
Status: COMPLETED | OUTPATIENT
Start: 2025-05-08 | End: 2025-05-08

## 2025-05-08 RX ORDER — LOSARTAN POTASSIUM 50 MG/1
50 TABLET ORAL
Status: DISCONTINUED | OUTPATIENT
Start: 2025-05-09 | End: 2025-05-08

## 2025-05-08 RX ADMIN — ZINC SULFATE 220 MG (50 MG) CAPSULE 220 MG: CAPSULE at 05:36

## 2025-05-08 RX ADMIN — KETOROLAC TROMETHAMINE 15 MG: 15 INJECTION, SOLUTION INTRAMUSCULAR; INTRAVENOUS at 13:00

## 2025-05-08 RX ADMIN — LOSARTAN POTASSIUM 25 MG: 50 TABLET, FILM COATED ORAL at 05:48

## 2025-05-08 RX ADMIN — OXYCODONE HYDROCHLORIDE AND ACETAMINOPHEN 500 MG: 500 TABLET ORAL at 05:36

## 2025-05-08 RX ADMIN — CARVEDILOL 12.5 MG: 12.5 TABLET, FILM COATED ORAL at 08:16

## 2025-05-08 RX ADMIN — DIPHENHYDRAMINE HYDROCHLORIDE 25 MG: 50 INJECTION, SOLUTION INTRAMUSCULAR; INTRAVENOUS at 16:29

## 2025-05-08 RX ADMIN — KETOROLAC TROMETHAMINE 15 MG: 15 INJECTION, SOLUTION INTRAMUSCULAR; INTRAVENOUS at 19:35

## 2025-05-08 RX ADMIN — DIPHENHYDRAMINE HYDROCHLORIDE 25 MG: 50 INJECTION, SOLUTION INTRAMUSCULAR; INTRAVENOUS at 21:18

## 2025-05-08 RX ADMIN — KETOROLAC TROMETHAMINE 15 MG: 15 INJECTION, SOLUTION INTRAMUSCULAR; INTRAVENOUS at 06:44

## 2025-05-08 RX ADMIN — OXYCODONE HYDROCHLORIDE AND ACETAMINOPHEN 500 MG: 500 TABLET ORAL at 16:30

## 2025-05-08 RX ADMIN — CARVEDILOL 12.5 MG: 12.5 TABLET, FILM COATED ORAL at 16:30

## 2025-05-08 RX ADMIN — DIPHENHYDRAMINE HYDROCHLORIDE 25 MG: 50 INJECTION, SOLUTION INTRAMUSCULAR; INTRAVENOUS at 12:46

## 2025-05-08 RX ADMIN — METHYLPREDNISOLONE SODIUM SUCCINATE 62.5 MG: 125 INJECTION, POWDER, FOR SOLUTION INTRAMUSCULAR; INTRAVENOUS at 12:46

## 2025-05-08 RX ADMIN — LOSARTAN POTASSIUM 25 MG: 25 TABLET, FILM COATED ORAL at 08:26

## 2025-05-08 RX ADMIN — ACETAMINOPHEN 650 MG: 325 TABLET ORAL at 08:26

## 2025-05-08 RX ADMIN — LABETALOL HYDROCHLORIDE 20 MG: 5 INJECTION, SOLUTION INTRAVENOUS at 05:39

## 2025-05-08 RX ADMIN — AMLODIPINE BESYLATE 5 MG: 5 TABLET ORAL at 12:46

## 2025-05-08 ASSESSMENT — COGNITIVE AND FUNCTIONAL STATUS - GENERAL
MOBILITY SCORE: 24
DAILY ACTIVITIY SCORE: 24
SUGGESTED CMS G CODE MODIFIER MOBILITY: CH
SUGGESTED CMS G CODE MODIFIER DAILY ACTIVITY: CH

## 2025-05-08 ASSESSMENT — ENCOUNTER SYMPTOMS
CHILLS: 0
CARDIOVASCULAR NEGATIVE: 1
SHORTNESS OF BREATH: 1
NERVOUS/ANXIOUS: 1
GASTROINTESTINAL NEGATIVE: 1
EYES NEGATIVE: 1
SPUTUM PRODUCTION: 1
COUGH: 1
DEPRESSION: 1
FEVER: 0
MYALGIAS: 1
NEUROLOGICAL NEGATIVE: 1
WHEEZING: 1

## 2025-05-08 ASSESSMENT — FIBROSIS 4 INDEX: FIB4 SCORE: 0.88

## 2025-05-08 ASSESSMENT — PAIN DESCRIPTION - PAIN TYPE
TYPE: ACUTE PAIN

## 2025-05-08 ASSESSMENT — LIFESTYLE VARIABLES: SUBSTANCE_ABUSE: 1

## 2025-05-08 NOTE — PROGRESS NOTES
Bedside report received from off going RN Rand    Fall Risk Score: MODERATE RISK  Fall risk interventions in place: Educate patient/family to call staff for assistance when getting out of bed, Place fall precaution signage outside patient door, Utilize bed/chair fall alarm, and Bed alarm connected correctly  Bed type: Regular (Danielito Score less than 17 interventions in place)  Patient on cardiac monitor: Yes  IVF/IV medications: Not Applicable   Oxygen: How many liters 2L  Bedside sitter: Not Applicable   Isolation: Not applicable

## 2025-05-08 NOTE — PROGRESS NOTES
Bedside report received from off going RN/tech: ARNOLD Dai, assumed care of patient.     Fall Risk Score: MODERATE RISK  Fall risk interventions in place: Place yellow fall risk ID band on patient, Provide patient/family education based on risk assessment, Educate patient/family to call staff for assistance when getting out of bed, Place fall precaution signage outside patient door, Place patient in room close to nursing station, Utilize bed/chair fall alarm, and Bed alarm connected correctly  Bed type: Regular (Danielito Score less than 17 interventions in place)  Patient on cardiac monitor: Yes  IVF/IV medications: Not Applicable   Oxygen: How many liters 2L, Traced the line to wall oxygen, and No oxygen tank in room  Bedside sitter: Not Applicable   Isolation: Not applicable

## 2025-05-08 NOTE — CARE PLAN
The patient is Watcher - Medium risk of patient condition declining or worsening    Shift Goals  Clinical Goals: hemodynamic stability  Patient Goals: comfort  Family Goals: RODNEY.    Progress made toward(s) clinical / shift goals:      Problem: Knowledge Deficit - Standard  Goal: Patient and family/care givers will demonstrate understanding of plan of care, disease process/condition, diagnostic tests and medications  Outcome: Progressing     Problem: Pain - Standard  Goal: Alleviation of pain or a reduction in pain to the patient’s comfort goal  Outcome: Progressing     Problem: Fall Risk  Goal: Patient will remain free from falls  Outcome: Progressing       Patient is not progressing towards the following goals:

## 2025-05-08 NOTE — WOUND TEAM
[facility]  Inpatient Services  Wound and Skin Care Brief Evaluation    Admission Date: 5/6/2025     Last order of IP CONSULT TO WOUND CARE was found on 5/7/2025 from Hospital Encounter on 5/6/2025     HPI, PMH, SH: Reviewed    Chief Complaint   Patient presents with    Cough     Productive green sputum with back pain with cough; started 1 wk ago.    Shortness of Breath     Increasing in the last 4 days     Diagnosis: Acute hypoxic respiratory failure (HCC) [J96.01]    Unit where seen by Wound Team: T803/02     Wound consult placed regarding buttocks. Chart and images reviewed. This discussed with bedside RN Micah. This clinician in to assess patient. Patient pleasant and agreeable. The sacrum and lower back with scarring and scabbing. Buttocks, and sacrum appear intact. This left open to air. Non-selectively debrided with N/A.     No pressure injuries or advanced wound care needs identified. Wound consult completed. No further follow up unless indicated and consulted.            PREVENTATIVE INTERVENTIONS:    Q shift Danielito - performed per nursing policy  Q shift pressure point assessments - performed per nursing policy    Surface/Positioning  Standard/trauma mattress - Currently in Place    Respiratory  Silicone O2 tubing - Currently in Place    Containment/Moisture Prevention    Dri-eliza pad - Currently in Place    Mobilization      Per bedside nursing, patient is ambulating

## 2025-05-08 NOTE — ASSESSMENT & PLAN NOTE
Patient has swelling of face and eyelids that is suggestive of angioedema.  He is very uncooperative with answering questions so it is unclear when this started or if he has had any exposure to allergens or was previously taking an ACE inhibitor.  He did receive losartan yesterday.  This has been discontinued.  Discussed with hospitalist KATARINA who took care of him yesterday and his face was a bit swollen at that time and he attributed it to laying out of the sun while intoxicated.  It has appeared to worsen.  I have ordered Benadryl and Solu-Medrol to start  Checking ESR, CRP and complement C4  Close airway monitoring, currently no stridor or difficulty breathing etc., patient is uncooperative with thorough exam  Continuous pulse ox

## 2025-05-08 NOTE — CARE PLAN
Problem: Knowledge Deficit - Standard  Goal: Patient and family/care givers will demonstrate understanding of plan of care, disease process/condition, diagnostic tests and medications  Outcome: Progressing  Note: Discuss and review POC with patient/family. Re-educate as needed.       Problem: Fall Risk  Goal: Patient will remain free from falls  Outcome: Progressing  Note: High fall risk. Treaded socks and bed/strip alarm on, side rails up x 3. Call light within reach. Pt educated to call for assistance. Reinforce as needed.      The patient is Stable - Low risk of patient condition declining or worsening    Shift Goals: Monitor oxygen   Clinical Goals: Monitor blood pressure  Patient Goals: Decrease anxiety  Family Goals: No family present    Progress made toward(s) clinical / shift goals:  A&Ox4    Patient is not progressing towards the following goals: anxiety

## 2025-05-08 NOTE — PROGRESS NOTES
reading down to 65% during sleep non-sustaining, recovers to 99% without intervention, 3L NC. SpO2>90% while awake. Facial swelling present, intermittent wheezes, no stridor noted on exam. When asked if patient is having difficulty breathing or feels his throat is swelling, patient refuses to interact, turns over and falls asleep. Dr. Wilson updated.

## 2025-05-08 NOTE — CARE PLAN
The patient is Stable - Low risk of patient condition declining or worsening    Shift Goals  Clinical Goals: Monitor BP and anxiety  Patient Goals: Sleep  Family Goals: RODNEY.    Progress made toward(s) clinical / shift goals:    Problem: Knowledge Deficit - Standard  Goal: Patient and family/care givers will demonstrate understanding of plan of care, disease process/condition, diagnostic tests and medications  Outcome: Progressing     Problem: Pain - Standard  Goal: Alleviation of pain or a reduction in pain to the patient’s comfort goal  Outcome: Progressing     Problem: Fall Risk  Goal: Patient will remain free from falls  Outcome: Progressing

## 2025-05-08 NOTE — DISCHARGE PLANNING
Case Management Discharge Planning    Admission Date: 5/6/2025  GMLOS: 3.5  ALOS: 1    6-Clicks ADL Score: 24  6-Clicks Mobility Score: 24      Anticipated Discharge Dispo: Discharge Disposition: Discharged to home/self care (01)  Discharge Address: [address] [address]  Discharge Contact Phone Number: [phone]    DME Needed: Pending hospital course     Action(s) Taken: Chart reviewed and pt discussed in IDT rounds pt is not MC for DC due to pt's living situation plan to wean pt off O2.     Escalations Completed: None    Medically Clear: No    Next Steps: F/U for any HCM needs     Barriers to Discharge: Medical clearance    Is the patient up for discharge tomorrow: No        Care Transition Team Assessment  LMSW conducted assessment and verify demographics.Pt is AOX4. Pt has multiple admits to ER. Pt was admitted on 5/6/25 for Acute respiratory failure with hypoxia (HCC).     Pt's address is [facility].     Pt was independent with ADLS and IADLS prior to admission with no use of DME.     Pt's insurance is [employer].     Upon DC plan to go back to place of choice.  Information Source  Information Given By: Patient, Other (Comments)  Who is responsible for making decisions for patient? : Patient    Readmission Evaluation  Is this a readmission?: No    Elopement Risk  Legal Hold: No  Ambulatory or Self Mobile in Wheelchair: Yes  Disoriented: No  Psychiatric Symptoms: None  History of Wandering: No  Elopement this Admit: No  Vocalizing Wanting to Leave: No  Displays Behaviors, Body Language Wanting to Leave: No-Not at Risk for Elopement  Elopement Risk: Not at Risk for Elopement    Interdisciplinary Discharge Planning  Lives with - Patient's Self Care Capacity: Alone and Able to Care For Self, Unable To Determine At This Time  Patient or legal guardian wants to designate a caregiver: No  Support Systems: Unable to Obtain at this Time  Housing / Facility: Homeless    Discharge Preparedness  What is  your plan after discharge?: Home with help  What are your discharge supports?: Parent  Prior Functional Level: Ambulatory, Independent with Activities of Daily Living, Independent with Medication Management  Difficulity with ADLs: None  Difficulity with IADLs: None    Functional Assesment  Prior Functional Level: Ambulatory, Independent with Activities of Daily Living, Independent with Medication Management    Finances  Financial Barriers to Discharge: Yes  Prescription Coverage: Yes    Vision / Hearing Impairment  Vision Impairment : No  Hearing Impairment : No    Advance Directive  Advance Directive?: None    Domestic Abuse  Have you ever been the victim of abuse or violence?: No  Possible Abuse/Neglect Reported to:: Not Applicable    Psychological Assessment  History of Substance Abuse: Methamphetamine  History of Psychiatric Problems: No  Non-compliant with Treatment: No  Newly Diagnosed Illness: No    Discharge Risks or Barriers  Discharge risks or barriers?: Homeless / couch surfing  Patient risk factors: Homeless    Anticipated Discharge Information  Discharge Disposition: Discharged to home/self care (01)  Discharge Address: [address] [place] [id number]  Discharge Contact Phone Number: [phone]

## 2025-05-08 NOTE — PROGRESS NOTES
ISOLATION PRECAUTIONS EDUCATION    Educated PATIENT, FAMILY, S.O: patient on isolation for COVID-19.    Educated on reason for isolation, how the infection may be transmitted, and how to help prevent transmission to others. Educated precautions involves staff and visitors wearing PPE, following Standard Precautions and performing meticulous hand hygiene in order to prevent transmission of infection.     Droplet Precautions: Educated that Droplet Precautions involves staff and visitors wearing PPE to include a surgical mask when in the patient room.     In addition, educated that they may leave their room, but prior to exiting the patient room each time, the patient needs to have on a fresh patient gown, a surgical mask must be worn by the patient while out of the patient room, and perform hand hygiene immediately prior to exiting the room.     Patient transport and mobilization on unit  Educated that they may leave their room, but prior to exiting, the patient needs to have on a fresh patient gown, ensure the potentially infectious area is covered, performing appropriate hand hygiene immediately prior to exiting the room.

## 2025-05-08 NOTE — PROGRESS NOTES
Hospital Medicine Daily Progress Note    Date of Service  5/8/2025    Chief Complaint  Harvinder Gabriel is a 44 y.o. male admitted 5/6/2025 with SOB, respiratory failure    Hospital Course  Mr. Harvinder Gabriel is a 44 y.o. male with a history of meth abuse, homelessness who presented 5/6/2025 with acute hypoxic respiratory failure.     Patient is very somnolent on presentation.  He does report recent cough, shortness of breath.  Also endorses some acute on chronic back pain.  Complains of lethargy.  History and review of systems is limited by somnolence.  No significant wheezing on exam.     On presentation to the emergency room the patient had hypertensive urgency with systolics in the 190s and was tachycardic, tachypneic.  He was requiring 2 L nasal cannula.  Labs were significant for leukocytosis at 13.3 AST elevated at 63, ALT 81.  Troponins initially elevated at 40 which trended down to 36.  BNP elevated at 3131.  UDS was positive for amphetamines, cannabinoids.  COVID/RSV/influenza negative. CT pulmonary angiogram showed no pulmonary emboli.  Small bilateral pleural effusions larger on the right.  No pneumonia or pneumothorax.   In the ER patient was given Tylenol, DuoNebs, Toradol and bolused 1 L of normal saline.  Patient admitted to hospital medicine for management of care.    During this hospitalization, patient noted to be somewhat verbally aggressive, agitated.  Patient does endorse he uses methamphetamine along with cannabis.  Patient also endorsed using alcohol.  Discussed with patient monitoring oxygen level due to noted acute hypoxic respiratory failure likely exacerbated by coronavirus.    Patient counseled and educated in regards to methamphetamine use cessation along with alcohol and tobacco use cessation.  At this time, patient is not wanting to quit illicit drug use.    Interval Problem Update  5/8  No acute events overnight, patient moved up to T8 from CDU.  Non-COVID coronavirus positive on  [facility]  Lymphedema Clinic  [address], [address], [facility].    LYMPHEDEMA THERAPY  VISIT: 12    []                  Daily note               [x]                 90 day Reassessment with Updated Plan of Care/Progress note    NAME: Hank Cherry  DATE: 3/9/2021     Patient was last seen in our clinic 11/24/20 and was scheduled to return in January 2021 to obtain a second set of garments prior to the end of his plan of care. Patient cancelled his appointment in January and did not return to the clinic until today. Patient's plan of care will need to be extended until 6/4/21 to allow patient to receive custom knee highs and return to the clinic for the fit and effectiveness of the garments to be assessed prior to discharge to the restorative phase of care. GOALS  Short term goals  Time frame: to be met by 7/22/2020  1. Patient will demonstrate knowledge of signs/symptoms of infections/cellulitis and be independent in skin care to prevent cellulitis. Patient has been educated on signs and symptoms of infection/cellulitis and has been performing skin care recommendations in the home setting. Goal Met 7/23/2020  2. Patient will demonstrate independence in lymphedema home program of therapeutic exercises to improve circulation and decongest limb to improve ADLs. Patient has been educated in the basic range of motion routine and Jitendra Beers routines to date. Patient is independent with handouts. Goal Met 7/23/2020    Short term goal-Extended to 9/5/2020  3. Patient will tolerate multi-layer bandages (MLB) and show measureable decrease in limb volume to allow ordering of home compression system (daytime, nighttime garments and pump as needed). Patient continues with daily use of his compression products Juxta-fit/Farrow products. He was encouraged to use up to 23 hours a day as tolerated.  Vendor notified us that he will not be able to receive more compression for 6 months so will have patient measured for custom flat knit knee highs at that time if volumes are stable. Will continue to hold use of vaso-pneumatic pump at this time as patient is on dialysis and has chronic kidney disease and history of Congestive Heart Failure. Goal Met 8/19/2020     Long term goals  Time frame: to be met by 9/5/2020, Goals will be extended to be met by 6/4/2021  1. Patient will be independent with don/doff of compression system and use in order to prevent reaccumulation of fluid at discharge. Patient is independent with the donning and doffing of his compression garments and has assist in the home if needed. Goal Met 7/29/2020.  2.  Pt will be independent in self-MLD and show stable limb volumes showing decongestion and pt. ready for transition to independent restorative phase of lymphedema therapy. Patient has been educated in Self MLD packet and is following recommendations to perform daily with bathing and skin care in the home. Full volumes assessed today revealed that patient has gained 608 ml in the R LE and gained 1474 ml in the L LE since volumes were last taken on 11/24/2020. Will assess volumes next visit. Goal not met at this time. New Goal to be met by 2/19/2021  3. Patient will be measured and fitted for custom knee highs and the fit and comfort will be deemed good prior to discharge to the restorative phase of care. Patient was measured for one pair of custom knee highs this visit and will return to the clinic for fitting of the garments. Progressing toward goal.          SUBJECTIVE REPORT: Patient continues to work from home due to [place] 19 virus. He reports that he has gained weight and is sedentary. He is wearing the L lower leg velcro product with compressive liner sock approximately 4 days per week and the R lower leg velcro product less frequently.  He continues with peritoneal dialysis in the home setting and follows up monthly with the respiratory PCR  Attempting to wean from oxygen  This morning his face and eyelids are swollen, I tried to obtain history from him on possible risk factors for angioedema and/or allergies/anaphylaxis however patient was entirely uncooperative with the interview, was very hostile and profane and would not answer any questions meaningfully.    I have ordered ESR, CRP and complement C4 to assess for possible underlying causes of angioedema.  He is protecting his airway, no stridor, no wheezing, is on fairly minimal oxygen.  I gave empiric Benadryl and methylprednisolone.  He was started on losartan yesterday and discontinued this in case it is ACE/ARB associated angioedema, started on amlodipine.  I discussed with hospitalist KATARINA who cared for him yesterday and his face was a bit swollen when he came in and patient attributed this to being out in the sun.      I have discussed this patient's plan of care and discharge plan at IDT rounds today with Case Management, Nursing, Nursing leadership, and other members of the IDT team.    Consultants/Specialty  NONE    Code Status  Full Code    Disposition  The patient is not medically cleared for discharge to home or a post-acute facility.      I have placed the appropriate orders for post-discharge needs.    Review of Systems  Review of Systems   Constitutional:  Positive for malaise/fatigue. Negative for chills and fever.   HENT: Negative.     Eyes: Negative.    Respiratory:  Positive for cough, sputum production, shortness of breath and wheezing.    Cardiovascular: Negative.    Gastrointestinal: Negative.    Genitourinary: Negative.    Musculoskeletal:  Positive for myalgias.   Skin: Negative.    Neurological: Negative.    Endo/Heme/Allergies: Negative.    Psychiatric/Behavioral:  Positive for depression and substance abuse. The patient is nervous/anxious.         Physical Exam  Temp:  [36.1 °C (97 °F)-36.9 °C (98.4 °F)] 36.2 °C (97.2 °F)  Pulse:  [] 98  Resp:   [18-22] 19  BP: (142-198)/() 158/115  SpO2:  [85 %-100 %] 92 %    Physical Exam  Vitals and nursing note reviewed.   Constitutional:       Appearance: He is obese.   HENT:      Head:      Comments: Face and eyelids are swollen, patient uncooperative with thorough exam of airway however no stridor or wheezing and appears to be moving air adequately     Nose: Nose normal.      Mouth/Throat:      Mouth: Mucous membranes are moist.      Pharynx: Oropharynx is clear.   Eyes:      Pupils: Pupils are equal, round, and reactive to light.   Cardiovascular:      Rate and Rhythm: Regular rhythm.   Pulmonary:      Effort: Pulmonary effort is normal.      Breath sounds: No stridor.   Musculoskeletal:         General: Tenderness present.      Cervical back: Normal range of motion and neck supple.   Skin:     General: Skin is dry.      Capillary Refill: Capillary refill takes 2 to 3 seconds.   Neurological:      Mental Status: He is alert. Mental status is at baseline.         Fluids    Intake/Output Summary (Last 24 hours) at 5/8/2025 1357  Last data filed at 5/8/2025 1300  Gross per 24 hour   Intake 960 ml   Output 2480 ml   Net -1520 ml        Laboratory  Recent Labs     05/06/25  0759 05/07/25  0217 05/08/25  0559   WBC 13.3* 14.3* 9.7   RBC 5.43 5.18 5.38   HEMOGLOBIN 16.2 15.7 15.6   HEMATOCRIT 48.1 46.3 48.8   MCV 88.6 89.4 90.7   MCH 29.8 30.3 29.0   MCHC 33.7 33.9 32.0*   RDW 46.5 47.5 48.5   PLATELETCT 340 317 281   MPV 9.1 9.0 9.3     Recent Labs     05/06/25  0759 05/07/25  0217 05/08/25  0559   SODIUM 138 136 132*   POTASSIUM 3.7 3.9 4.0   CHLORIDE 101 100 98   CO2 28 26 26   GLUCOSE 116* 125* 152*   BUN 16 17 18   CREATININE 1.26 1.17 1.01   CALCIUM 8.7 8.4* 8.7                   Imaging  EC-ECHOCARDIOGRAM COMPLETE W/ CONT   Final Result      CT-CTA CHEST PULMONARY ARTERY W/ RECONS   Final Result      1.  No CT evidence for pulmonary emboli.   2.  Small bilateral pleural effusions, larger on the RIGHT.   3.  No  physician. Medications:  Patient reports the following changes in medications since last visit: Removal:  Flexeril, Neurontin, and Voltaren from the medication list. Addition: Bumex to the medication list.     Pain: Patient reports 6/10 pain in the R lower leg from neuropathy. Pain is constant. Gait: Independent gait without any assistive device for community distances    ADLs:  Independent for most.     Treatment Response:  Patient was scheduled to return to our clinic January 2021 to obtain additional compression garments prior to discharge to the restorative phase of care. Patient cancelled the appointment due to another commitment and returns today to be measured for custom knee highs. Patient is not consistent with participation in his home program: he is not participating in an exercise program or wearing compression garments on a daily basis. Patient returns to the clinic today with an increase in volume in the legs bilaterally as well as a 25 lb weight gain. Function: Independent for most activities. Drives. 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 [facility] Lymphedema Assessment Scale: Deferred    Weight: 354.8 lb on 11/24/20, 379.0 lb this visit. TREATMENT AND OBJECTIVE DATA SUMMARY:     Therapeutic Activity 0 minutes   Treatment time: N/A  Functional Mobility: Transfers:   Independent    Gait:   Independent    Fall risk: low (hx CVA,has some issues with R LE sensation at times)      Therapeutic Exercise/Procedure 0 minutes    Treatment time: N/A  Walking program Patient is not participating in a daily walking program as instructed. Joyce ball exercise program: Patient previously educated in the Hope routine. Free exercises/ROM: Patient has been educated in the Active ROM routine and has the written instructions to follow. Patient is not performing a routine each day as instructed.      Home program: Patient to perform daily to BID:  Skin care, Deep abdominal breathing, Exercise routine, Rest in supine, Compression bandage, Compression garments, Self manual lymph draining (MLD) and Bring supplies to each therapy visit. Patient has purchased adequate foot wear to wear with his lower leg velcro products. Rationale: Exercise will increase the lymph angiomotoricity and tissue pressure of the skin and thus decrease swelling. Modalities 0 minutes   Treatment time: 0  Vasopneumatic pump: Will continue to hold use of vaso-pneumatic pump at this time as patient is on dialysis and has chronic kidney disease and history of Congestive Heart Failure. Manual Lymphatic Drainage (MLD) 65 minutes   Treatment time: 11:30 am-12:35 pm   Patient/family education provided in self MLD. Continued education in self MLD with bathing and skin care. Patient has the written instructions to follow. Area to decongest: B LEs and Trunk   Sequence used and effectiveness: Secondary sequence for trunk and R LE    Skin/wound care/debridement: Patient continues with dryness and discoloration of the legs and feet bilaterally. Continued education in daily skin care with a low Ph lotion during skin care in the clinic today. Washed legs and feet with Aveeno Soap and applied Remedy lotion using MLD techniques. Patient reports performing daily skin care with Charito lotion at home. Discussed using Aquaphor or a similar lotion and moisturizing prior to bed. Applied multi-layer compression bandaging to: Patient has bandaging supplies and caregiver has been educated in bandage application. Bandaging has been deferred by patient since his girlfriend has difficulty bandaging due to back issues. Upper/Lower extremity compression: Patient has the following garments: JuxtaFit Premium lower leg garments in XL Tall for bilateral legs, Farrow Classic Foot Pieces in Medium/ Long, additional silver closed toe liner socks and also Circaid compression liner socks.     Patient pneumonia or pneumothorax.   4.  Subcutaneous edema in the midline anterior chest wall of uncertain etiology and significance.               DX-CHEST-PORTABLE (1 VIEW)   Final Result         1.  No acute cardiopulmonary disease.   2.  Cardiomegaly           Assessment/Plan  * Acute respiratory failure with hypoxia (HCC)- (present on admission)  Assessment & Plan  Possibly secondary to bronchitis versus meth induced cardiomyopathy  Wean supplemental oxygen as tolerated  Positive for coronavirus, non-COVID  Supportive care  Wean from oxygen as tolerated    Angioedema  Assessment & Plan  Patient has swelling of face and eyelids that is suggestive of angioedema.  He is very uncooperative with answering questions so it is unclear when this started or if he has had any exposure to allergens or was previously taking an ACE inhibitor.  He did receive losartan yesterday.  This has been discontinued.  Discussed with hospitalist KATARINA who took care of him yesterday and his face was a bit swollen at that time and he attributed it to laying out of the sun while intoxicated.  It has appeared to worsen.  I have ordered Benadryl and Solu-Medrol to start  Checking ESR, CRP and complement C4  Close airway monitoring, currently no stridor or difficulty breathing etc., patient is uncooperative with thorough exam  Continuous pulse ox    Metabolic encephalopathy  Assessment & Plan  Likely from polysubstance abuse  Mental state is a 5/8 he does not seem encephalopathic however is very evasive and uncooperative with questioning    Hypertensive urgency  Assessment & Plan  Started on Losartan, this has been discontinued due to suspected angioedema though is unclear if this is the source as patient is entirely uncooperative with giving history.  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  Monitor volume status closely    Elevated troponin  Assessment & Plan  40 and trended down to 36.  Denies any chest pain  Likely demand ischemia  Check echo    Elevated LFTs  Assessment & Plan  From hypertensive urgency?  Screen hepatitis in the setting of drug abuse    Methamphetamine abuse (HCC)- (present on admission)  Assessment & Plan  UDS positive for methamphetamines, cannabinoids         VTE prophylaxis: VTE Selection    I have performed a physical exam and reviewed and updated ROS and Plan today (5/8/2025). In review of yesterday's note (5/7/2025), there are no changes except as documented above.      Patient is critically ill.   The patient continues to have: Possible angioedema, respiratory failure, hypertensive urgency  The vital organ system that is affected is the: Circulatory, respiratory, cardiovascular  If untreated there is a high chance of deterioration into: Worsening hypertensive crisis, worsening angioedema progressing to airway compromise  And eventually death.   The critical care that I am providing today is: Attempted thorough history and physical exam, have ordered IV medications including steroids and antihistamine, discussion with other providers  The critical that has been undertaken is medically complex.   There has been no overlap in critical care time.   Critical Care Time not including procedures: 42 minutes     reports wearing the lower leg velcro product with compressive liner sock on the L lower leg 4 days per week and on the R lower leg less often. Patient prefers not to wear the Farrow Classic Foot Pieces since they are too bulky but continued education this visit in using the foot pieces daily as tolerated for better management for swelling at the foot and ankle. Continued education in laundering instructions of the velcro products and compressive liner socks. Patient returned to the clinic today to be measured for a second set of garments. Provided patient with samples of day garments and patient voiced interest in obtaining: one pair of custom Jobst Elvarex knee highs CCL 2 with top silicone band. The order will be submitted to the vendor, [employer], and patient will bring the garments to the clinic for fitting. Rehana Gutierrez Kinesiotaping: Deferred   Girth/Volume measurement: Reviewed results of volumetric measurements taken on evaluation. -left lower extremity: 13,534. 59 ml today compared to 12,060.47  ml on last visit 11/24/20. On evaluation (6/10/2020) volumes in L LE were 15,251.95 ml     -right lower extremity: 11,518.64 ml today compared to 10,910.62 ml on last visit 11/24/20. On evaluation (6/10/2020) volumes in R LE were 13,210.43 ml. ASSESSMENT:   Patient has not been seen in our clinic since November 2020. Patient is responsible for peritoneal dialysis in the home setting and is followed monthly by the physician. Full LE volumetric measurements taken today reveal a gain of volume bilaterally as well as a 25 lb weight gain. Patient continues to work from home and is sedentary. He is not participating in exercise or wearing compression garments daily as instructed. Patient is ready to order a second set of garments and prefers to obtain custom knee highs which will be a less bulky and more comfortable option.    Patient instructed to continue with his home program to the best of his ability and return to the clinic once garments are received. PLAN OF CARE:   Continue with plan of care as established on evaluation. Changes to the plan of care are as follows: Plan of care will need to be extended until 6/4/21 for patient to return to the clinic for the fit and effectiveness of the with custom knee highs to be assessed prior to discharge to the restorative phase of care. Frequency: Follow up once custom garments are received. Next session will address: Assess volumes  Continue education on self MLD  Garment fitting  Education in preparation for discharge to the restorative phase of care   Other: Vendor: [employer] for insurance covered products  [employer] for self pay items. TOTAL TREATMENT 65 mins     Cathy Castillo, PT, CLT    TREATMENT PLAN EFFECTIVE DATES:   3/9/2020 to 6/4/2021  I have read the above plan of care for Mana Pickard. I certify the above prescribed services are required by this patient and are medically necessary.   The above plan of care has been developed in conjunction with the lymphedema/physical therapist.   Physician Signature: ____________________________________________________      Dr. Ella Roque   Date: _____________

## 2025-05-09 ENCOUNTER — PHARMACY VISIT (OUTPATIENT)
Dept: PHARMACY | Facility: MEDICAL CENTER | Age: 45
End: 2025-05-09
Payer: COMMERCIAL

## 2025-05-09 VITALS
OXYGEN SATURATION: 97 % | DIASTOLIC BLOOD PRESSURE: 106 MMHG | TEMPERATURE: 97.1 F | BODY MASS INDEX: 30.33 KG/M2 | HEIGHT: 70 IN | SYSTOLIC BLOOD PRESSURE: 162 MMHG | RESPIRATION RATE: 18 BRPM | WEIGHT: 211.86 LBS | HEART RATE: 100 BPM

## 2025-05-09 PROCEDURE — 700102 HCHG RX REV CODE 250 W/ 637 OVERRIDE(OP): Performed by: NURSE PRACTITIONER

## 2025-05-09 PROCEDURE — RXMED WILLOW AMBULATORY MEDICATION CHARGE: Performed by: INTERNAL MEDICINE

## 2025-05-09 PROCEDURE — 700111 HCHG RX REV CODE 636 W/ 250 OVERRIDE (IP): Mod: JZ | Performed by: STUDENT IN AN ORGANIZED HEALTH CARE EDUCATION/TRAINING PROGRAM

## 2025-05-09 PROCEDURE — A9270 NON-COVERED ITEM OR SERVICE: HCPCS | Performed by: INTERNAL MEDICINE

## 2025-05-09 PROCEDURE — 700102 HCHG RX REV CODE 250 W/ 637 OVERRIDE(OP): Performed by: INTERNAL MEDICINE

## 2025-05-09 PROCEDURE — 99239 HOSP IP/OBS DSCHRG MGMT >30: CPT | Performed by: INTERNAL MEDICINE

## 2025-05-09 PROCEDURE — 700102 HCHG RX REV CODE 250 W/ 637 OVERRIDE(OP): Performed by: STUDENT IN AN ORGANIZED HEALTH CARE EDUCATION/TRAINING PROGRAM

## 2025-05-09 PROCEDURE — A9270 NON-COVERED ITEM OR SERVICE: HCPCS | Performed by: STUDENT IN AN ORGANIZED HEALTH CARE EDUCATION/TRAINING PROGRAM

## 2025-05-09 PROCEDURE — A9270 NON-COVERED ITEM OR SERVICE: HCPCS | Performed by: NURSE PRACTITIONER

## 2025-05-09 RX ORDER — ALBUTEROL SULFATE 90 UG/1
2 INHALANT RESPIRATORY (INHALATION) EVERY 4 HOURS PRN
Qty: 8.5 G | Refills: 0 | Status: SHIPPED | OUTPATIENT
Start: 2025-05-09

## 2025-05-09 RX ORDER — FAMOTIDINE 20 MG/1
20 TABLET, FILM COATED ORAL 2 TIMES DAILY
Qty: 10 TABLET | Refills: 0 | Status: SHIPPED | OUTPATIENT
Start: 2025-05-09 | End: 2025-05-14

## 2025-05-09 RX ORDER — METHYLPREDNISOLONE 4 MG/1
TABLET ORAL
Qty: 21 TABLET | Refills: 0 | Status: SHIPPED | OUTPATIENT
Start: 2025-05-09

## 2025-05-09 RX ORDER — CARVEDILOL 12.5 MG/1
12.5 TABLET ORAL 2 TIMES DAILY WITH MEALS
Qty: 60 TABLET | Refills: 0 | Status: SHIPPED | OUTPATIENT
Start: 2025-05-09

## 2025-05-09 RX ORDER — METHYLPREDNISOLONE SODIUM SUCCINATE 40 MG/ML
40 INJECTION, POWDER, LYOPHILIZED, FOR SOLUTION INTRAMUSCULAR; INTRAVENOUS ONCE
Status: DISCONTINUED | OUTPATIENT
Start: 2025-05-09 | End: 2025-05-09 | Stop reason: HOSPADM

## 2025-05-09 RX ORDER — AMLODIPINE BESYLATE 10 MG/1
10 TABLET ORAL
Status: DISCONTINUED | OUTPATIENT
Start: 2025-05-10 | End: 2025-05-09 | Stop reason: HOSPADM

## 2025-05-09 RX ORDER — AMLODIPINE BESYLATE 10 MG/1
10 TABLET ORAL DAILY
Qty: 100 TABLET | Refills: 3 | Status: SHIPPED | OUTPATIENT
Start: 2025-05-10 | End: 2026-06-14

## 2025-05-09 RX ORDER — AMLODIPINE BESYLATE 5 MG/1
5 TABLET ORAL
Status: COMPLETED | OUTPATIENT
Start: 2025-05-09 | End: 2025-05-09

## 2025-05-09 RX ADMIN — AMLODIPINE BESYLATE 5 MG: 5 TABLET ORAL at 04:52

## 2025-05-09 RX ADMIN — CARVEDILOL 12.5 MG: 12.5 TABLET, FILM COATED ORAL at 07:56

## 2025-05-09 RX ADMIN — DIPHENHYDRAMINE HYDROCHLORIDE 25 MG: 50 INJECTION, SOLUTION INTRAMUSCULAR; INTRAVENOUS at 04:51

## 2025-05-09 RX ADMIN — ZINC SULFATE 220 MG (50 MG) CAPSULE 220 MG: CAPSULE at 04:52

## 2025-05-09 RX ADMIN — AMLODIPINE BESYLATE 5 MG: 5 TABLET ORAL at 07:56

## 2025-05-09 RX ADMIN — OXYCODONE HYDROCHLORIDE AND ACETAMINOPHEN 500 MG: 500 TABLET ORAL at 04:52

## 2025-05-09 ASSESSMENT — FIBROSIS 4 INDEX: FIB4 SCORE: 0.97

## 2025-05-09 NOTE — PROGRESS NOTES
Pt yelling, punching wall and toilet paper barkley throwing tantrum because toilet paper would not come out. Pt asked to stop, and calm down. Pt continues to yell. Security called.   Security at bedside to help RN set expectations. Pt not speaking with security or RN to confirm learning.

## 2025-05-09 NOTE — PROGRESS NOTES
Pt dc'd to home. IV and monitor removed; monitor room notified. Pt left unit via WC with security. Pt is aggressive. Personal belongings with pt when leaving unit. Pt given discharge instructions prior to leaving unit including where to  prescriptions and when to follow-up; verbalizes understanding. Copy of discharge instructions with pt and in the chart.

## 2025-05-09 NOTE — PROGRESS NOTES
Bedside report received from off going RN/tech: Rand, assumed care of patient.     Fall Risk Score: MODERATE RISK  Fall risk interventions in place: Place yellow fall risk ID band on patient, Provide patient/family education based on risk assessment, Educate patient/family to call staff for assistance when getting out of bed, Place fall precaution signage outside patient door, Place patient in room close to nursing station, Utilize bed/chair fall alarm, Notify charge of high risk for huddle, and Bed alarm connected correctly  Bed type: Regular (Danielito Score less than 17 interventions in place)  Patient on cardiac monitor: Yes  IVF/IV medications: Not Applicable   Oxygen: How many liters 2L  Bedside sitter: Not Applicable   Isolation: Not applicable

## 2025-05-09 NOTE — DISCHARGE SUMMARY
Discharge Summary    CHIEF COMPLAINT ON ADMISSION  Chief Complaint   Patient presents with    Cough     Productive green sputum with back pain with cough; started 1 wk ago.    Shortness of Breath     Increasing in the last 4 days       Reason for Admission  Cough/SOB     Admission Date  5/6/2025    CODE STATUS  Full Code    HPI & HOSPITAL COURSE  This is a 44 y.o. male here with dyspnea.     Mr. Harvinder Gabriel is a 44 y.o. male with a history of meth abuse, homelessness who presented 5/6/2025 with acute hypoxic respiratory failure. He does report recent cough, shortness of breath.  Also endorses some acute on chronic back pain.  Complains of lethargy. On presentation to the emergency room the patient had hypertensive urgency with systolics in the 190s and was tachycardic, tachypneic.  He was requiring 2 L nasal cannula.  Labs were significant for leukocytosis at 13.3 AST elevated at 63, ALT 81.  Troponins initially elevated at 40 which trended down to 36.  BNP elevated at 3131.  UDS was positive for amphetamines, cannabinoids.  COVID/RSV/influenza negative. CT pulmonary angiogram showed no pulmonary emboli.  Small bilateral pleural effusions larger on the right.  No pneumonia or pneumothorax.   In the ER patient was given Tylenol, DuoNebs, Toradol and bolused 1 L of normal saline.  Patient admitted to hospital medicine for management of care.  Patient did come back positive for coronavirus NL63 by PCR testing.    During this hospitalization, patient noted to be somewhat verbally aggressive, agitated.  Patient does endorse he uses methamphetamine along with cannabis.  Patient also endorsed using alcohol. Patient counseled and educated in regards to methamphetamine use cessation along with alcohol and tobacco use cessation.  At this time, patient is not wanting to quit illicit drug use.  Patient was started on losartan with reactino including edema, concerning for angioedema.  Losartan was stopped and patient was  started on IV steroids with improvement.  Echocardiogram was performed which showed EF 45% with RVSP 40 mmHg, he did not have any signs of acute exacerbation on exam.  Likely hypoxia due to respiratory viral infection which responded to supportive care.  Patient was started on Coreg and amlodipine for his blood pressure.  He will need to follow-up outpatient with cardiology for further management of his mid range EF.  Mainstay of his treatment recommendations are methamphetamine cessation.  Patient's vital signs are stable on room air, throughout hospitalization he has been uncooperative and hostile refusing to interact with healthcare providers.  He has been given a Medrol Dosepak in addition to Pepcid for any lingering allergic symptoms.  He is medically cleared for discharge with outpatient follow-up with primary care and cardiology.  He is to return to the ER if his condition worsens.    Therefore, he is discharged in good and stable condition to home with close outpatient follow-up.    The patient met 2-midnight criteria for an inpatient stay at the time of discharge.    Discharge Date  5/9/2025    FOLLOW UP ITEMS POST DISCHARGE  Follow up with primary care and cardiology     DISCHARGE DIAGNOSES  Principal Problem:    Acute respiratory failure with hypoxia (HCC) (POA: Yes)  Active Problems:    Methamphetamine abuse (HCC) (POA: Yes)    Elevated LFTs (POA: Unknown)    Elevated troponin (POA: Unknown)    Elevated brain natriuretic peptide (BNP) level (POA: Unknown)    SIRS (systemic inflammatory response syndrome) (HCC) (POA: Unknown)    Hypertensive urgency (POA: Unknown)    Metabolic encephalopathy (POA: Unknown)    Angioedema (POA: Unknown)  Resolved Problems:    * No resolved hospital problems. *      FOLLOW UP  No future appointments.  [facility]  [address] [facility] [address]  [phone]  Go on 5/15/2025  Arrive at 8:00am for ongoing heart failure treatment. This is a walk-in clinic. Patients  are seen on a first come, first served basis, wait times may vary. This clinic offers a sliding-fee scale.      MEDICATIONS ON DISCHARGE     Medication List        START taking these medications        Instructions   albuterol 108 (90 Base) MCG/ACT Aers inhalation aerosol   Inhale 2 Puffs every four hours as needed for Shortness of Breath.  Dose: 2 Puff     amLODIPine 10 MG Tabs  Start taking on: May 10, 2025  Commonly known as: Norvasc   Take 1 Tablet by mouth every day.  Dose: 10 mg     carvedilol 12.5 MG Tabs  Commonly known as: Coreg   Take 1 Tablet by mouth 2 times a day with meals.  Dose: 12.5 mg     famotidine 20 MG Tabs  Commonly known as: Pepcid   Take 1 Tablet by mouth 2 times a day for 5 days.  Dose: 20 mg     methylPREDNISolone 4 MG Tbpk  Commonly known as: Medrol Dosepak   Doctor's comments:    Follow schedule on package instructions.              Allergies  Allergies   Allergen Reactions    Losartan Swelling     Pt likely had angioedema from Losartan (5/6-5/8/2025)       DIET  Orders Placed This Encounter   Procedures    Diet Order Diet: Cardiac     Standing Status:   Standing     Number of Occurrences:   1     Diet::   Cardiac [6]       ACTIVITY  As tolerated.  Weight bearing as tolerated    CONSULTATIONS  None     PROCEDURES  None     LABORATORY  Lab Results   Component Value Date    SODIUM 132 (L) 05/08/2025    POTASSIUM 4.0 05/08/2025    CHLORIDE 98 05/08/2025    CO2 26 05/08/2025    GLUCOSE 152 (H) 05/08/2025    BUN 18 05/08/2025    CREATININE 1.01 05/08/2025        Lab Results   Component Value Date    WBC 9.7 05/08/2025    HEMOGLOBIN 15.6 05/08/2025    HEMATOCRIT 48.8 05/08/2025    PLATELETCT 281 05/08/2025        Total time of the discharge process exceeds 35 minutes.

## 2025-05-23 ENCOUNTER — TELEPHONE (OUTPATIENT)
Dept: HEALTH INFORMATION MANAGEMENT | Facility: OTHER | Age: 45
End: 2025-05-23
Payer: MEDICAID

## 2025-05-23 NOTE — ED NOTES
Bedside report to ARNOLD Alanis. Pt to floor with belongings.    PAST SURGICAL HISTORY:  No significant past surgical history

## 2025-08-11 ENCOUNTER — HOSPITAL ENCOUNTER (INPATIENT)
Facility: MEDICAL CENTER | Age: 45
LOS: 2 days | DRG: 291 | End: 2025-08-13
Attending: STUDENT IN AN ORGANIZED HEALTH CARE EDUCATION/TRAINING PROGRAM | Admitting: STUDENT IN AN ORGANIZED HEALTH CARE EDUCATION/TRAINING PROGRAM
Payer: MEDICAID

## 2025-08-11 ENCOUNTER — APPOINTMENT (OUTPATIENT)
Dept: RADIOLOGY | Facility: MEDICAL CENTER | Age: 45
DRG: 291 | End: 2025-08-11
Attending: STUDENT IN AN ORGANIZED HEALTH CARE EDUCATION/TRAINING PROGRAM
Payer: MEDICAID

## 2025-08-11 DIAGNOSIS — R00.0 TACHYCARDIA: ICD-10-CM

## 2025-08-11 DIAGNOSIS — I50.9 ACUTE ON CHRONIC CONGESTIVE HEART FAILURE, UNSPECIFIED HEART FAILURE TYPE (HCC): ICD-10-CM

## 2025-08-11 DIAGNOSIS — R60.0 PERIPHERAL EDEMA: ICD-10-CM

## 2025-08-11 DIAGNOSIS — F15.10 METHAMPHETAMINE ABUSE (HCC): ICD-10-CM

## 2025-08-11 DIAGNOSIS — J96.01 ACUTE HYPOXEMIC RESPIRATORY FAILURE (HCC): Primary | ICD-10-CM

## 2025-08-11 DIAGNOSIS — T78.3XXA ANGIOEDEMA, INITIAL ENCOUNTER: ICD-10-CM

## 2025-08-11 DIAGNOSIS — I16.0 HYPERTENSIVE URGENCY: ICD-10-CM

## 2025-08-11 DIAGNOSIS — Z91.148 NONCOMPLIANCE WITH MEDICATION REGIMEN: ICD-10-CM

## 2025-08-11 PROBLEM — R41.9 MEDICATION NONCOMPLIANCE DUE TO COGNITIVE IMPAIRMENT: Status: ACTIVE | Noted: 2025-08-11

## 2025-08-11 LAB
ACTION RANGE TRIGGERED IACRT: NO
ALBUMIN SERPL BCP-MCNC: 4 G/DL (ref 3.2–4.9)
ALBUMIN/GLOB SERPL: 1.3 G/DL
ALP SERPL-CCNC: 92 U/L (ref 30–99)
ALT SERPL-CCNC: 61 U/L (ref 2–50)
AMPHET UR QL SCN: POSITIVE
ANION GAP SERPL CALC-SCNC: 15 MMOL/L (ref 7–16)
APPEARANCE UR: CLEAR
ARTERIAL PATENCY WRIST A: ABNORMAL
AST SERPL-CCNC: 52 U/L (ref 12–45)
BACTERIA #/AREA URNS HPF: NORMAL /HPF
BARBITURATES UR QL SCN: NEGATIVE
BASE EXCESS BLDA CALC-SCNC: -1 MMOL/L (ref -4–3)
BASOPHILS # BLD AUTO: 0.5 % (ref 0–1.8)
BASOPHILS # BLD: 0.06 K/UL (ref 0–0.12)
BENZODIAZ UR QL SCN: NEGATIVE
BILIRUB SERPL-MCNC: 0.5 MG/DL (ref 0.1–1.5)
BILIRUB UR QL STRIP.AUTO: NEGATIVE
BUN SERPL-MCNC: 15 MG/DL (ref 8–22)
BZE UR QL SCN: NEGATIVE
CALCIUM ALBUM COR SERPL-MCNC: 8.9 MG/DL (ref 8.5–10.5)
CALCIUM SERPL-MCNC: 8.9 MG/DL (ref 8.5–10.5)
CANNABINOIDS UR QL SCN: POSITIVE
CASTS URNS QL MICRO: NORMAL /LPF (ref 0–2)
CHLORIDE SERPL-SCNC: 106 MMOL/L (ref 96–112)
CO2 BLDA-SCNC: 26 MMOL/L (ref 20–33)
CO2 SERPL-SCNC: 21 MMOL/L (ref 20–33)
COLOR UR: YELLOW
CREAT SERPL-MCNC: 1.3 MG/DL (ref 0.5–1.4)
DELSYS IDSYS: ABNORMAL
EKG IMPRESSION: NORMAL
EOSINOPHIL # BLD AUTO: 0.07 K/UL (ref 0–0.51)
EOSINOPHIL NFR BLD: 0.6 % (ref 0–6.9)
EPITHELIAL CELLS 1715: NORMAL /HPF (ref 0–5)
ERYTHROCYTE [DISTWIDTH] IN BLOOD BY AUTOMATED COUNT: 49.1 FL (ref 35.9–50)
ETHANOL BLD-MCNC: <10.1 MG/DL
FENTANYL UR QL: NEGATIVE
FLUAV RNA SPEC QL NAA+PROBE: NEGATIVE
FLUBV RNA SPEC QL NAA+PROBE: NEGATIVE
GFR SERPLBLD CREATININE-BSD FMLA CKD-EPI: 69 ML/MIN/1.73 M 2
GLOBULIN SER CALC-MCNC: 3 G/DL (ref 1.9–3.5)
GLUCOSE SERPL-MCNC: 124 MG/DL (ref 65–99)
GLUCOSE UR STRIP.AUTO-MCNC: NEGATIVE MG/DL
HCO3 BLDA-SCNC: 25 MMOL/L (ref 21–28)
HCT VFR BLD AUTO: 47.8 % (ref 42–52)
HGB BLD-MCNC: 16.2 G/DL (ref 14–18)
HYALINE CAST   1831: PRESENT /LPF
IMM GRANULOCYTES # BLD AUTO: 0.08 K/UL (ref 0–0.11)
IMM GRANULOCYTES NFR BLD AUTO: 0.6 % (ref 0–0.9)
INST. QUALIFIED PATIENT IIQPT: YES
KETONES UR STRIP.AUTO-MCNC: NEGATIVE MG/DL
LACTATE BLD-SCNC: 0.73 MMOL/L (ref 0.5–2)
LACTATE SERPL-SCNC: 1.7 MMOL/L (ref 0.5–2)
LEUKOCYTE ESTERASE UR QL STRIP.AUTO: NEGATIVE
LYMPHOCYTES # BLD AUTO: 1.48 K/UL (ref 1–4.8)
LYMPHOCYTES NFR BLD: 11.7 % (ref 22–41)
Lab: ABNORMAL
MCH RBC QN AUTO: 30.6 PG (ref 27–33)
MCHC RBC AUTO-ENTMCNC: 33.9 G/DL (ref 32.3–36.5)
MCV RBC AUTO: 90.4 FL (ref 81.4–97.8)
METHADONE UR QL SCN: NEGATIVE
MICRO URNS: ABNORMAL
MONOCYTES # BLD AUTO: 1.21 K/UL (ref 0–0.85)
MONOCYTES NFR BLD AUTO: 9.5 % (ref 0–13.4)
NEUTROPHILS # BLD AUTO: 9.78 K/UL (ref 1.82–7.42)
NEUTROPHILS NFR BLD: 77.1 % (ref 44–72)
NITRITE UR QL STRIP.AUTO: NEGATIVE
NRBC # BLD AUTO: 0 K/UL
NRBC BLD-RTO: 0 /100 WBC (ref 0–0.2)
NT-PROBNP SERPL IA-MCNC: 2990 PG/ML (ref 0–125)
O2/TOTAL GAS SETTING VFR VENT: 50 %
O2/TOTAL GAS SETTING VFR VENT: 50 %
OPIATES UR QL SCN: NEGATIVE
OXYCODONE UR QL SCN: NEGATIVE
PCO2 BLDA: 44 MMHG (ref 32–48)
PCO2 TEMP ADJ BLDA: 42 MMHG (ref 32–48)
PCP UR QL SCN: NEGATIVE
PH BLDA: 7.36 [PH] (ref 7.35–7.45)
PH TEMP ADJ BLDA: 7.37 [PH] (ref 7.35–7.45)
PH UR STRIP.AUTO: 6 [PH] (ref 5–8)
PLATELET # BLD AUTO: 359 K/UL (ref 164–446)
PMV BLD AUTO: 9.2 FL (ref 9–12.9)
PO2 BLDA: 187 MMHG (ref 83–108)
PO2 TEMP ADJ BLDA: 183 MMHG (ref 83–108)
POTASSIUM SERPL-SCNC: 3.6 MMOL/L (ref 3.6–5.5)
PROPOXYPH UR QL SCN: NEGATIVE
PROT SERPL-MCNC: 7 G/DL (ref 6–8.2)
PROT UR QL STRIP: 100 MG/DL
RBC # BLD AUTO: 5.29 M/UL (ref 4.7–6.1)
RBC # URNS HPF: NORMAL /HPF (ref 0–2)
RBC UR QL AUTO: NEGATIVE
RSV RNA SPEC QL NAA+PROBE: NEGATIVE
SAO2 % BLDA: 100 % (ref 93–99)
SARS-COV-2 RNA RESP QL NAA+PROBE: NEGATIVE
SODIUM SERPL-SCNC: 142 MMOL/L (ref 135–145)
SP GR UR STRIP.AUTO: 1.01
SPECIMEN DRAWN FROM PATIENT: ABNORMAL
TROPONIN T SERPL-MCNC: 39 NG/L (ref 6–19)
UROBILINOGEN UR STRIP.AUTO-MCNC: 0.2 EU/DL
WBC # BLD AUTO: 12.7 K/UL (ref 4.8–10.8)
WBC #/AREA URNS HPF: NORMAL /HPF

## 2025-08-11 PROCEDURE — 96376 TX/PRO/DX INJ SAME DRUG ADON: CPT

## 2025-08-11 PROCEDURE — 36415 COLL VENOUS BLD VENIPUNCTURE: CPT

## 2025-08-11 PROCEDURE — 84484 ASSAY OF TROPONIN QUANT: CPT

## 2025-08-11 PROCEDURE — 94660 CPAP INITIATION&MGMT: CPT

## 2025-08-11 PROCEDURE — 99223 1ST HOSP IP/OBS HIGH 75: CPT | Performed by: STUDENT IN AN ORGANIZED HEALTH CARE EDUCATION/TRAINING PROGRAM

## 2025-08-11 PROCEDURE — 700111 HCHG RX REV CODE 636 W/ 250 OVERRIDE (IP): Mod: JZ,UD | Performed by: STUDENT IN AN ORGANIZED HEALTH CARE EDUCATION/TRAINING PROGRAM

## 2025-08-11 PROCEDURE — 770020 HCHG ROOM/CARE - TELE (206)

## 2025-08-11 PROCEDURE — 93005 ELECTROCARDIOGRAM TRACING: CPT | Mod: TC | Performed by: STUDENT IN AN ORGANIZED HEALTH CARE EDUCATION/TRAINING PROGRAM

## 2025-08-11 PROCEDURE — 83605 ASSAY OF LACTIC ACID: CPT

## 2025-08-11 PROCEDURE — 93005 ELECTROCARDIOGRAM TRACING: CPT | Mod: TC

## 2025-08-11 PROCEDURE — 80307 DRUG TEST PRSMV CHEM ANLYZR: CPT

## 2025-08-11 PROCEDURE — 83605 ASSAY OF LACTIC ACID: CPT | Performed by: STUDENT IN AN ORGANIZED HEALTH CARE EDUCATION/TRAINING PROGRAM

## 2025-08-11 PROCEDURE — 99285 EMERGENCY DEPT VISIT HI MDM: CPT

## 2025-08-11 PROCEDURE — 80053 COMPREHEN METABOLIC PANEL: CPT

## 2025-08-11 PROCEDURE — 700111 HCHG RX REV CODE 636 W/ 250 OVERRIDE (IP): Mod: JZ | Performed by: STUDENT IN AN ORGANIZED HEALTH CARE EDUCATION/TRAINING PROGRAM

## 2025-08-11 PROCEDURE — 82077 ASSAY SPEC XCP UR&BREATH IA: CPT

## 2025-08-11 PROCEDURE — 96375 TX/PRO/DX INJ NEW DRUG ADDON: CPT

## 2025-08-11 PROCEDURE — 83880 ASSAY OF NATRIURETIC PEPTIDE: CPT

## 2025-08-11 PROCEDURE — 82803 BLOOD GASES ANY COMBINATION: CPT | Performed by: STUDENT IN AN ORGANIZED HEALTH CARE EDUCATION/TRAINING PROGRAM

## 2025-08-11 PROCEDURE — 36600 WITHDRAWAL OF ARTERIAL BLOOD: CPT

## 2025-08-11 PROCEDURE — 85025 COMPLETE CBC W/AUTO DIFF WBC: CPT

## 2025-08-11 PROCEDURE — 700102 HCHG RX REV CODE 250 W/ 637 OVERRIDE(OP): Performed by: STUDENT IN AN ORGANIZED HEALTH CARE EDUCATION/TRAINING PROGRAM

## 2025-08-11 PROCEDURE — 87040 BLOOD CULTURE FOR BACTERIA: CPT | Mod: 91

## 2025-08-11 PROCEDURE — 0241U POC COV-2, FLU A/B, RSV BY PCR: CPT | Performed by: STUDENT IN AN ORGANIZED HEALTH CARE EDUCATION/TRAINING PROGRAM

## 2025-08-11 PROCEDURE — 81001 URINALYSIS AUTO W/SCOPE: CPT

## 2025-08-11 PROCEDURE — 96374 THER/PROPH/DIAG INJ IV PUSH: CPT

## 2025-08-11 PROCEDURE — A9270 NON-COVERED ITEM OR SERVICE: HCPCS | Performed by: STUDENT IN AN ORGANIZED HEALTH CARE EDUCATION/TRAINING PROGRAM

## 2025-08-11 PROCEDURE — 87086 URINE CULTURE/COLONY COUNT: CPT

## 2025-08-11 PROCEDURE — 71045 X-RAY EXAM CHEST 1 VIEW: CPT

## 2025-08-11 RX ORDER — FUROSEMIDE 10 MG/ML
40 INJECTION INTRAMUSCULAR; INTRAVENOUS 2 TIMES DAILY
Status: DISCONTINUED | OUTPATIENT
Start: 2025-08-11 | End: 2025-08-13 | Stop reason: HOSPADM

## 2025-08-11 RX ORDER — ALBUTEROL SULFATE 90 UG/1
2 INHALANT RESPIRATORY (INHALATION) EVERY 4 HOURS PRN
Status: DISCONTINUED | OUTPATIENT
Start: 2025-08-11 | End: 2025-08-13 | Stop reason: HOSPADM

## 2025-08-11 RX ORDER — POLYETHYLENE GLYCOL 3350 17 G/17G
1 POWDER, FOR SOLUTION ORAL
Status: DISCONTINUED | OUTPATIENT
Start: 2025-08-11 | End: 2025-08-13 | Stop reason: HOSPADM

## 2025-08-11 RX ORDER — ACETAMINOPHEN 325 MG/1
650 TABLET ORAL EVERY 6 HOURS PRN
Status: DISCONTINUED | OUTPATIENT
Start: 2025-08-11 | End: 2025-08-13 | Stop reason: HOSPADM

## 2025-08-11 RX ORDER — PROMETHAZINE HYDROCHLORIDE 25 MG/1
12.5-25 TABLET ORAL EVERY 4 HOURS PRN
Status: DISCONTINUED | OUTPATIENT
Start: 2025-08-11 | End: 2025-08-13 | Stop reason: HOSPADM

## 2025-08-11 RX ORDER — NALOXONE HYDROCHLORIDE 1 MG/ML
2 INJECTION INTRAMUSCULAR; INTRAVENOUS; SUBCUTANEOUS ONCE
Status: DISPENSED | OUTPATIENT
Start: 2025-08-11 | End: 2025-08-12

## 2025-08-11 RX ORDER — AMOXICILLIN 250 MG
2 CAPSULE ORAL EVERY EVENING
Status: DISCONTINUED | OUTPATIENT
Start: 2025-08-11 | End: 2025-08-13 | Stop reason: HOSPADM

## 2025-08-11 RX ORDER — AMLODIPINE BESYLATE 10 MG/1
10 TABLET ORAL DAILY
Status: DISCONTINUED | OUTPATIENT
Start: 2025-08-12 | End: 2025-08-12

## 2025-08-11 RX ORDER — PROCHLORPERAZINE EDISYLATE 5 MG/ML
5-10 INJECTION INTRAMUSCULAR; INTRAVENOUS EVERY 4 HOURS PRN
Status: DISCONTINUED | OUTPATIENT
Start: 2025-08-11 | End: 2025-08-13 | Stop reason: HOSPADM

## 2025-08-11 RX ORDER — ONDANSETRON 4 MG/1
4 TABLET, ORALLY DISINTEGRATING ORAL EVERY 4 HOURS PRN
Status: DISCONTINUED | OUTPATIENT
Start: 2025-08-11 | End: 2025-08-13 | Stop reason: HOSPADM

## 2025-08-11 RX ORDER — IPRATROPIUM BROMIDE AND ALBUTEROL SULFATE 2.5; .5 MG/3ML; MG/3ML
3 SOLUTION RESPIRATORY (INHALATION)
Status: DISCONTINUED | OUTPATIENT
Start: 2025-08-11 | End: 2025-08-13 | Stop reason: HOSPADM

## 2025-08-11 RX ORDER — CARVEDILOL 12.5 MG/1
12.5 TABLET ORAL 2 TIMES DAILY WITH MEALS
Status: DISCONTINUED | OUTPATIENT
Start: 2025-08-11 | End: 2025-08-13

## 2025-08-11 RX ORDER — ONDANSETRON 2 MG/ML
4 INJECTION INTRAMUSCULAR; INTRAVENOUS EVERY 4 HOURS PRN
Status: DISCONTINUED | OUTPATIENT
Start: 2025-08-11 | End: 2025-08-13 | Stop reason: HOSPADM

## 2025-08-11 RX ORDER — HYDRALAZINE HYDROCHLORIDE 20 MG/ML
10 INJECTION INTRAMUSCULAR; INTRAVENOUS EVERY 4 HOURS PRN
Status: DISCONTINUED | OUTPATIENT
Start: 2025-08-11 | End: 2025-08-13 | Stop reason: HOSPADM

## 2025-08-11 RX ORDER — ENOXAPARIN SODIUM 100 MG/ML
40 INJECTION SUBCUTANEOUS DAILY
Status: DISCONTINUED | OUTPATIENT
Start: 2025-08-11 | End: 2025-08-13 | Stop reason: HOSPADM

## 2025-08-11 RX ORDER — FUROSEMIDE 10 MG/ML
40 INJECTION INTRAMUSCULAR; INTRAVENOUS ONCE
Status: COMPLETED | OUTPATIENT
Start: 2025-08-11 | End: 2025-08-11

## 2025-08-11 RX ORDER — PROMETHAZINE HYDROCHLORIDE 25 MG/1
12.5-25 SUPPOSITORY RECTAL EVERY 4 HOURS PRN
Status: DISCONTINUED | OUTPATIENT
Start: 2025-08-11 | End: 2025-08-13 | Stop reason: HOSPADM

## 2025-08-11 RX ADMIN — HYDRALAZINE HYDROCHLORIDE 10 MG: 20 INJECTION INTRAMUSCULAR; INTRAVENOUS at 23:47

## 2025-08-11 RX ADMIN — CARVEDILOL 12.5 MG: 12.5 TABLET, FILM COATED ORAL at 21:43

## 2025-08-11 RX ADMIN — FUROSEMIDE 40 MG: 10 INJECTION, SOLUTION INTRAVENOUS at 18:50

## 2025-08-11 RX ADMIN — FUROSEMIDE 40 MG: 10 INJECTION, SOLUTION INTRAVENOUS at 21:45

## 2025-08-11 ASSESSMENT — ENCOUNTER SYMPTOMS
ORTHOPNEA: 0
EYE PAIN: 0
HEADACHES: 0
ABDOMINAL PAIN: 0
NERVOUS/ANXIOUS: 0
BACK PAIN: 0
NAUSEA: 0
CLAUDICATION: 0
NECK PAIN: 0
EYE DISCHARGE: 0
HALLUCINATIONS: 0
FEVER: 0
SHORTNESS OF BREATH: 1
PHOTOPHOBIA: 0
DOUBLE VISION: 0
VOMITING: 0
DIARRHEA: 0
CHILLS: 0
HEMOPTYSIS: 0
DIZZINESS: 0
PALPITATIONS: 1
SPUTUM PRODUCTION: 0

## 2025-08-11 ASSESSMENT — LIFESTYLE VARIABLES: SUBSTANCE_ABUSE: 1

## 2025-08-11 ASSESSMENT — FIBROSIS 4 INDEX: FIB4 SCORE: 0.97

## 2025-08-11 ASSESSMENT — PAIN DESCRIPTION - PAIN TYPE: TYPE: ACUTE PAIN;CHRONIC PAIN

## 2025-08-11 ASSESSMENT — PULMONARY FUNCTION TESTS: EPAP_CMH2O: 8

## 2025-08-12 ENCOUNTER — APPOINTMENT (OUTPATIENT)
Dept: RADIOLOGY | Facility: MEDICAL CENTER | Age: 45
DRG: 291 | End: 2025-08-12
Attending: INTERNAL MEDICINE
Payer: MEDICAID

## 2025-08-12 PROBLEM — I50.23 ACUTE ON CHRONIC SYSTOLIC HEART FAILURE (HCC): Status: ACTIVE | Noted: 2025-08-11

## 2025-08-12 LAB
ALBUMIN SERPL BCP-MCNC: 3.6 G/DL (ref 3.2–4.9)
ALBUMIN/GLOB SERPL: 1.2 G/DL
ALP SERPL-CCNC: 81 U/L (ref 30–99)
ALT SERPL-CCNC: 52 U/L (ref 2–50)
ANION GAP SERPL CALC-SCNC: 13 MMOL/L (ref 7–16)
AST SERPL-CCNC: 45 U/L (ref 12–45)
BILIRUB SERPL-MCNC: 0.6 MG/DL (ref 0.1–1.5)
BUN SERPL-MCNC: 16 MG/DL (ref 8–22)
CALCIUM ALBUM COR SERPL-MCNC: 9 MG/DL (ref 8.5–10.5)
CALCIUM SERPL-MCNC: 8.7 MG/DL (ref 8.5–10.5)
CHLORIDE SERPL-SCNC: 99 MMOL/L (ref 96–112)
CO2 SERPL-SCNC: 24 MMOL/L (ref 20–33)
CREAT SERPL-MCNC: 1.01 MG/DL (ref 0.5–1.4)
ERYTHROCYTE [DISTWIDTH] IN BLOOD BY AUTOMATED COUNT: 48.5 FL (ref 35.9–50)
GFR SERPLBLD CREATININE-BSD FMLA CKD-EPI: 93 ML/MIN/1.73 M 2
GLOBULIN SER CALC-MCNC: 2.9 G/DL (ref 1.9–3.5)
GLUCOSE SERPL-MCNC: 143 MG/DL (ref 65–99)
HCT VFR BLD AUTO: 46.8 % (ref 42–52)
HGB BLD-MCNC: 15.6 G/DL (ref 14–18)
INR PPP: 0.97 (ref 0.87–1.13)
MCH RBC QN AUTO: 29.4 PG (ref 27–33)
MCHC RBC AUTO-ENTMCNC: 33.3 G/DL (ref 32.3–36.5)
MCV RBC AUTO: 88.3 FL (ref 81.4–97.8)
PLATELET # BLD AUTO: 330 K/UL (ref 164–446)
PMV BLD AUTO: 9.3 FL (ref 9–12.9)
POTASSIUM SERPL-SCNC: 3.2 MMOL/L (ref 3.6–5.5)
PROCALCITONIN SERPL-MCNC: 0.08 NG/ML
PROT SERPL-MCNC: 6.5 G/DL (ref 6–8.2)
PROTHROMBIN TIME: 12.9 SEC (ref 12–14.6)
RBC # BLD AUTO: 5.3 M/UL (ref 4.7–6.1)
SODIUM SERPL-SCNC: 136 MMOL/L (ref 135–145)
TROPONIN T SERPL-MCNC: 33 NG/L (ref 6–19)
WBC # BLD AUTO: 10.4 K/UL (ref 4.8–10.8)

## 2025-08-12 PROCEDURE — 76705 ECHO EXAM OF ABDOMEN: CPT

## 2025-08-12 PROCEDURE — 85027 COMPLETE CBC AUTOMATED: CPT

## 2025-08-12 PROCEDURE — A9270 NON-COVERED ITEM OR SERVICE: HCPCS | Performed by: INTERNAL MEDICINE

## 2025-08-12 PROCEDURE — 85610 PROTHROMBIN TIME: CPT

## 2025-08-12 PROCEDURE — 99233 SBSQ HOSP IP/OBS HIGH 50: CPT | Performed by: INTERNAL MEDICINE

## 2025-08-12 PROCEDURE — 770020 HCHG ROOM/CARE - TELE (206)

## 2025-08-12 PROCEDURE — 36415 COLL VENOUS BLD VENIPUNCTURE: CPT

## 2025-08-12 PROCEDURE — A9270 NON-COVERED ITEM OR SERVICE: HCPCS | Performed by: NURSE PRACTITIONER

## 2025-08-12 PROCEDURE — 700102 HCHG RX REV CODE 250 W/ 637 OVERRIDE(OP): Performed by: NURSE PRACTITIONER

## 2025-08-12 PROCEDURE — 80053 COMPREHEN METABOLIC PANEL: CPT

## 2025-08-12 PROCEDURE — A9270 NON-COVERED ITEM OR SERVICE: HCPCS | Performed by: STUDENT IN AN ORGANIZED HEALTH CARE EDUCATION/TRAINING PROGRAM

## 2025-08-12 PROCEDURE — 84484 ASSAY OF TROPONIN QUANT: CPT

## 2025-08-12 PROCEDURE — 96374 THER/PROPH/DIAG INJ IV PUSH: CPT

## 2025-08-12 PROCEDURE — 700111 HCHG RX REV CODE 636 W/ 250 OVERRIDE (IP): Mod: JZ | Performed by: STUDENT IN AN ORGANIZED HEALTH CARE EDUCATION/TRAINING PROGRAM

## 2025-08-12 PROCEDURE — 700102 HCHG RX REV CODE 250 W/ 637 OVERRIDE(OP): Performed by: STUDENT IN AN ORGANIZED HEALTH CARE EDUCATION/TRAINING PROGRAM

## 2025-08-12 PROCEDURE — 84145 PROCALCITONIN (PCT): CPT

## 2025-08-12 PROCEDURE — 700102 HCHG RX REV CODE 250 W/ 637 OVERRIDE(OP): Performed by: INTERNAL MEDICINE

## 2025-08-12 RX ORDER — ASPIRIN 81 MG/1
81 TABLET ORAL DAILY
Status: DISCONTINUED | OUTPATIENT
Start: 2025-08-12 | End: 2025-08-13 | Stop reason: HOSPADM

## 2025-08-12 RX ORDER — OXYCODONE HYDROCHLORIDE 5 MG/1
5 TABLET ORAL ONCE
Refills: 0 | Status: COMPLETED | OUTPATIENT
Start: 2025-08-12 | End: 2025-08-12

## 2025-08-12 RX ORDER — SPIRONOLACTONE 25 MG/1
12.5 TABLET ORAL
Status: DISCONTINUED | OUTPATIENT
Start: 2025-08-12 | End: 2025-08-13 | Stop reason: HOSPADM

## 2025-08-12 RX ORDER — HYDRALAZINE HYDROCHLORIDE 25 MG/1
25 TABLET, FILM COATED ORAL EVERY 8 HOURS
Status: DISCONTINUED | OUTPATIENT
Start: 2025-08-12 | End: 2025-08-13 | Stop reason: HOSPADM

## 2025-08-12 RX ORDER — ISOSORBIDE DINITRATE 10 MG/1
5 TABLET ORAL 3 TIMES DAILY
Status: DISCONTINUED | OUTPATIENT
Start: 2025-08-12 | End: 2025-08-13

## 2025-08-12 RX ADMIN — ACETAMINOPHEN 650 MG: 325 TABLET ORAL at 03:27

## 2025-08-12 RX ADMIN — HYDRALAZINE HYDROCHLORIDE 25 MG: 25 TABLET ORAL at 13:30

## 2025-08-12 RX ADMIN — CARVEDILOL 12.5 MG: 12.5 TABLET, FILM COATED ORAL at 07:35

## 2025-08-12 RX ADMIN — AMLODIPINE BESYLATE 10 MG: 10 TABLET ORAL at 06:22

## 2025-08-12 RX ADMIN — SPIRONOLACTONE 12.5 MG: 25 TABLET ORAL at 13:30

## 2025-08-12 RX ADMIN — ISOSORBIDE DINITRATE 5 MG: 10 TABLET ORAL at 13:30

## 2025-08-12 RX ADMIN — FUROSEMIDE 40 MG: 10 INJECTION, SOLUTION INTRAVENOUS at 06:23

## 2025-08-12 RX ADMIN — ACETAMINOPHEN 650 MG: 325 TABLET ORAL at 13:31

## 2025-08-12 RX ADMIN — ISOSORBIDE DINITRATE 5 MG: 10 TABLET ORAL at 16:58

## 2025-08-12 RX ADMIN — CARVEDILOL 12.5 MG: 12.5 TABLET, FILM COATED ORAL at 16:58

## 2025-08-12 RX ADMIN — OXYCODONE 5 MG: 5 TABLET ORAL at 03:57

## 2025-08-12 RX ADMIN — HYDRALAZINE HYDROCHLORIDE 25 MG: 25 TABLET ORAL at 22:45

## 2025-08-12 RX ADMIN — ASPIRIN 81 MG: 81 TABLET, COATED ORAL at 09:01

## 2025-08-12 RX ADMIN — FUROSEMIDE 40 MG: 10 INJECTION, SOLUTION INTRAVENOUS at 16:58

## 2025-08-12 ASSESSMENT — COGNITIVE AND FUNCTIONAL STATUS - GENERAL
SUGGESTED CMS G CODE MODIFIER DAILY ACTIVITY: CK
DRESSING REGULAR UPPER BODY CLOTHING: A LITTLE
MOVING TO AND FROM BED TO CHAIR: A LITTLE
TOILETING: A LITTLE
EATING MEALS: A LITTLE
DAILY ACTIVITIY SCORE: 18
SUGGESTED CMS G CODE MODIFIER MOBILITY: CK
TURNING FROM BACK TO SIDE WHILE IN FLAT BAD: A LITTLE
CLIMB 3 TO 5 STEPS WITH RAILING: A LITTLE
DRESSING REGULAR LOWER BODY CLOTHING: A LITTLE
PERSONAL GROOMING: A LITTLE
STANDING UP FROM CHAIR USING ARMS: A LITTLE
MOVING FROM LYING ON BACK TO SITTING ON SIDE OF FLAT BED: A LITTLE
MOBILITY SCORE: 19
HELP NEEDED FOR BATHING: A LITTLE

## 2025-08-12 ASSESSMENT — ENCOUNTER SYMPTOMS
DIARRHEA: 0
MYALGIAS: 0
CHILLS: 0
PALPITATIONS: 0
VOMITING: 0
DOUBLE VISION: 0
BLURRED VISION: 0
WEIGHT LOSS: 0
CLAUDICATION: 0
COUGH: 0
ORTHOPNEA: 0
SPEECH CHANGE: 0
WEAKNESS: 0
PHOTOPHOBIA: 0
DIZZINESS: 0
NAUSEA: 0
CONSTIPATION: 0
ABDOMINAL PAIN: 0
FEVER: 0
NECK PAIN: 0
HEMOPTYSIS: 0

## 2025-08-12 ASSESSMENT — LIFESTYLE VARIABLES
CONSUMPTION TOTAL: NEGATIVE
TOTAL SCORE: 0
ON A TYPICAL DAY WHEN YOU DRINK ALCOHOL HOW MANY DRINKS DO YOU HAVE: 0
HOW MANY TIMES IN THE PAST YEAR HAVE YOU HAD 5 OR MORE DRINKS IN A DAY: 0
HAVE PEOPLE ANNOYED YOU BY CRITICIZING YOUR DRINKING: NO
TOTAL SCORE: 0
ALCOHOL_USE: NO
AVERAGE NUMBER OF DAYS PER WEEK YOU HAVE A DRINK CONTAINING ALCOHOL: 0
DOES PATIENT WANT TO STOP DRINKING: NO
HAVE YOU EVER FELT YOU SHOULD CUT DOWN ON YOUR DRINKING: NO
EVER FELT BAD OR GUILTY ABOUT YOUR DRINKING: NO
TOTAL SCORE: 0
EVER HAD A DRINK FIRST THING IN THE MORNING TO STEADY YOUR NERVES TO GET RID OF A HANGOVER: NO

## 2025-08-12 ASSESSMENT — PAIN DESCRIPTION - PAIN TYPE
TYPE: ACUTE PAIN
TYPE: ACUTE PAIN
TYPE: CHRONIC PAIN
TYPE: CHRONIC PAIN

## 2025-08-12 ASSESSMENT — FIBROSIS 4 INDEX: FIB4 SCORE: 0.85

## 2025-08-13 ENCOUNTER — PHARMACY VISIT (OUTPATIENT)
Dept: PHARMACY | Facility: MEDICAL CENTER | Age: 45
End: 2025-08-13
Payer: COMMERCIAL

## 2025-08-13 VITALS
DIASTOLIC BLOOD PRESSURE: 106 MMHG | HEIGHT: 70 IN | SYSTOLIC BLOOD PRESSURE: 164 MMHG | WEIGHT: 211.86 LBS | BODY MASS INDEX: 30.33 KG/M2 | TEMPERATURE: 97.6 F | OXYGEN SATURATION: 91 % | HEART RATE: 81 BPM | RESPIRATION RATE: 18 BRPM

## 2025-08-13 LAB
ALBUMIN SERPL BCP-MCNC: 3.7 G/DL (ref 3.2–4.9)
ALBUMIN/GLOB SERPL: 1.4 G/DL
ALP SERPL-CCNC: 86 U/L (ref 30–99)
ALT SERPL-CCNC: 49 U/L (ref 2–50)
ANION GAP SERPL CALC-SCNC: 13 MMOL/L (ref 7–16)
AST SERPL-CCNC: 37 U/L (ref 12–45)
BACTERIA UR CULT: NORMAL
BILIRUB SERPL-MCNC: 0.4 MG/DL (ref 0.1–1.5)
BUN SERPL-MCNC: 16 MG/DL (ref 8–22)
CALCIUM ALBUM COR SERPL-MCNC: 9.2 MG/DL (ref 8.5–10.5)
CALCIUM SERPL-MCNC: 9 MG/DL (ref 8.5–10.5)
CHLORIDE SERPL-SCNC: 98 MMOL/L (ref 96–112)
CO2 SERPL-SCNC: 26 MMOL/L (ref 20–33)
CREAT SERPL-MCNC: 1.07 MG/DL (ref 0.5–1.4)
ERYTHROCYTE [DISTWIDTH] IN BLOOD BY AUTOMATED COUNT: 45.9 FL (ref 35.9–50)
GFR SERPLBLD CREATININE-BSD FMLA CKD-EPI: 87 ML/MIN/1.73 M 2
GLOBULIN SER CALC-MCNC: 2.7 G/DL (ref 1.9–3.5)
GLUCOSE SERPL-MCNC: 152 MG/DL (ref 65–99)
HCT VFR BLD AUTO: 48.2 % (ref 42–52)
HGB BLD-MCNC: 16.4 G/DL (ref 14–18)
MCH RBC QN AUTO: 29.5 PG (ref 27–33)
MCHC RBC AUTO-ENTMCNC: 34 G/DL (ref 32.3–36.5)
MCV RBC AUTO: 86.7 FL (ref 81.4–97.8)
PLATELET # BLD AUTO: 369 K/UL (ref 164–446)
PMV BLD AUTO: 9.1 FL (ref 9–12.9)
POTASSIUM SERPL-SCNC: 3.6 MMOL/L (ref 3.6–5.5)
PROT SERPL-MCNC: 6.4 G/DL (ref 6–8.2)
RBC # BLD AUTO: 5.56 M/UL (ref 4.7–6.1)
SIGNIFICANT IND 70042: NORMAL
SITE SITE: NORMAL
SODIUM SERPL-SCNC: 137 MMOL/L (ref 135–145)
SOURCE SOURCE: NORMAL
WBC # BLD AUTO: 9.6 K/UL (ref 4.8–10.8)

## 2025-08-13 PROCEDURE — 36415 COLL VENOUS BLD VENIPUNCTURE: CPT

## 2025-08-13 PROCEDURE — 700102 HCHG RX REV CODE 250 W/ 637 OVERRIDE(OP): Performed by: INTERNAL MEDICINE

## 2025-08-13 PROCEDURE — 85027 COMPLETE CBC AUTOMATED: CPT

## 2025-08-13 PROCEDURE — 700102 HCHG RX REV CODE 250 W/ 637 OVERRIDE(OP)

## 2025-08-13 PROCEDURE — A9270 NON-COVERED ITEM OR SERVICE: HCPCS | Performed by: INTERNAL MEDICINE

## 2025-08-13 PROCEDURE — A9270 NON-COVERED ITEM OR SERVICE: HCPCS

## 2025-08-13 PROCEDURE — 99239 HOSP IP/OBS DSCHRG MGMT >30: CPT | Performed by: INTERNAL MEDICINE

## 2025-08-13 PROCEDURE — 80053 COMPREHEN METABOLIC PANEL: CPT

## 2025-08-13 PROCEDURE — 700102 HCHG RX REV CODE 250 W/ 637 OVERRIDE(OP): Performed by: STUDENT IN AN ORGANIZED HEALTH CARE EDUCATION/TRAINING PROGRAM

## 2025-08-13 PROCEDURE — 99285 EMERGENCY DEPT VISIT HI MDM: CPT

## 2025-08-13 PROCEDURE — RXMED WILLOW AMBULATORY MEDICATION CHARGE: Performed by: INTERNAL MEDICINE

## 2025-08-13 PROCEDURE — A9270 NON-COVERED ITEM OR SERVICE: HCPCS | Performed by: STUDENT IN AN ORGANIZED HEALTH CARE EDUCATION/TRAINING PROGRAM

## 2025-08-13 PROCEDURE — 97602 WOUND(S) CARE NON-SELECTIVE: CPT

## 2025-08-13 PROCEDURE — 700111 HCHG RX REV CODE 636 W/ 250 OVERRIDE (IP): Mod: JZ | Performed by: STUDENT IN AN ORGANIZED HEALTH CARE EDUCATION/TRAINING PROGRAM

## 2025-08-13 RX ORDER — ISOSORBIDE DINITRATE 10 MG/1
10 TABLET ORAL 3 TIMES DAILY
Status: DISCONTINUED | OUTPATIENT
Start: 2025-08-13 | End: 2025-08-13 | Stop reason: HOSPADM

## 2025-08-13 RX ORDER — FUROSEMIDE 40 MG/1
40 TABLET ORAL DAILY
Qty: 30 TABLET | Refills: 1 | Status: SHIPPED | OUTPATIENT
Start: 2025-08-13

## 2025-08-13 RX ORDER — ASPIRIN 81 MG/1
81 TABLET ORAL DAILY
Qty: 30 TABLET | Refills: 2 | Status: SHIPPED | OUTPATIENT
Start: 2025-08-14

## 2025-08-13 RX ORDER — LORAZEPAM 0.5 MG/1
TABLET ORAL
Status: DISCONTINUED
Start: 2025-08-13 | End: 2025-08-13

## 2025-08-13 RX ORDER — ALBUTEROL SULFATE 90 UG/1
2 INHALANT RESPIRATORY (INHALATION) EVERY 4 HOURS PRN
Qty: 8.5 G | Refills: 0 | Status: SHIPPED | OUTPATIENT
Start: 2025-08-13

## 2025-08-13 RX ORDER — CARVEDILOL 25 MG/1
25 TABLET ORAL 2 TIMES DAILY WITH MEALS
Qty: 180 TABLET | Refills: 2 | Status: SHIPPED | OUTPATIENT
Start: 2025-08-13

## 2025-08-13 RX ORDER — CARVEDILOL 25 MG/1
25 TABLET ORAL 2 TIMES DAILY WITH MEALS
Status: DISCONTINUED | OUTPATIENT
Start: 2025-08-13 | End: 2025-08-13 | Stop reason: HOSPADM

## 2025-08-13 RX ORDER — ISOSORBIDE MONONITRATE 30 MG/1
30 TABLET, EXTENDED RELEASE ORAL EVERY MORNING
Qty: 90 TABLET | Refills: 1 | Status: SHIPPED | OUTPATIENT
Start: 2025-08-13

## 2025-08-13 RX ORDER — SPIRONOLACTONE 25 MG/1
12.5 TABLET ORAL DAILY
Qty: 30 TABLET | Refills: 3 | Status: SHIPPED | OUTPATIENT
Start: 2025-08-14

## 2025-08-13 RX ORDER — HYDRALAZINE HYDROCHLORIDE 25 MG/1
25 TABLET, FILM COATED ORAL EVERY 8 HOURS
Qty: 270 TABLET | Refills: 2 | Status: SHIPPED | OUTPATIENT
Start: 2025-08-13

## 2025-08-13 RX ADMIN — CARVEDILOL 25 MG: 25 TABLET, FILM COATED ORAL at 08:14

## 2025-08-13 RX ADMIN — FUROSEMIDE 40 MG: 10 INJECTION, SOLUTION INTRAVENOUS at 05:13

## 2025-08-13 RX ADMIN — ASPIRIN 81 MG: 81 TABLET, COATED ORAL at 05:11

## 2025-08-13 RX ADMIN — LORAZEPAM 0.5 MG: 0.5 TABLET ORAL at 03:00

## 2025-08-13 RX ADMIN — HYDRALAZINE HYDROCHLORIDE 25 MG: 25 TABLET ORAL at 05:11

## 2025-08-13 RX ADMIN — ISOSORBIDE DINITRATE 5 MG: 10 TABLET ORAL at 05:11

## 2025-08-13 RX ADMIN — HYDRALAZINE HYDROCHLORIDE 10 MG: 20 INJECTION INTRAMUSCULAR; INTRAVENOUS at 02:51

## 2025-08-13 RX ADMIN — ISOSORBIDE DINITRATE 10 MG: 10 TABLET ORAL at 08:14

## 2025-08-13 RX ADMIN — SPIRONOLACTONE 12.5 MG: 25 TABLET ORAL at 05:12

## 2025-08-13 RX ADMIN — ACETAMINOPHEN 650 MG: 325 TABLET ORAL at 05:11

## 2025-08-13 ASSESSMENT — PAIN DESCRIPTION - PAIN TYPE
TYPE: ACUTE PAIN
TYPE: CHRONIC PAIN

## 2025-08-13 ASSESSMENT — FIBROSIS 4 INDEX: FIB4 SCORE: 0.66

## 2025-08-14 ENCOUNTER — PATIENT OUTREACH (OUTPATIENT)
Dept: HEALTH INFORMATION MANAGEMENT | Facility: OTHER | Age: 45
End: 2025-08-14
Payer: MEDICAID

## 2025-08-16 LAB
BACTERIA BLD CULT: NORMAL
BACTERIA BLD CULT: NORMAL
SIGNIFICANT IND 70042: NORMAL
SIGNIFICANT IND 70042: NORMAL
SITE SITE: NORMAL
SITE SITE: NORMAL
SOURCE SOURCE: NORMAL
SOURCE SOURCE: NORMAL